# Patient Record
Sex: FEMALE | Race: WHITE | Employment: UNEMPLOYED | URBAN - METROPOLITAN AREA
[De-identification: names, ages, dates, MRNs, and addresses within clinical notes are randomized per-mention and may not be internally consistent; named-entity substitution may affect disease eponyms.]

---

## 2018-12-17 RX ORDER — NIFEDIPINE 30 MG/1
30 TABLET, FILM COATED, EXTENDED RELEASE ORAL DAILY
COMMUNITY
End: 2019-04-30

## 2018-12-17 RX ORDER — SODIUM CHLORIDE 1000 MG
1 TABLET, SOLUBLE MISCELLANEOUS DAILY
COMMUNITY
End: 2021-10-06

## 2018-12-17 RX ORDER — VARENICLINE TARTRATE 1 MG/1
1 TABLET, FILM COATED ORAL 2 TIMES DAILY
COMMUNITY

## 2018-12-17 RX ORDER — TIZANIDINE 2 MG/1
2 TABLET ORAL 2 TIMES DAILY
COMMUNITY
End: 2021-10-06

## 2018-12-17 RX ORDER — FEXOFENADINE HCL 180 MG/1
180 TABLET ORAL DAILY
COMMUNITY

## 2018-12-17 RX ORDER — ASPIRIN 81 MG/1
81 TABLET ORAL DAILY
COMMUNITY

## 2018-12-17 RX ORDER — ALENDRONATE SODIUM 35 MG/1
35 TABLET ORAL
COMMUNITY

## 2018-12-18 ENCOUNTER — HOSPITAL ENCOUNTER (OUTPATIENT)
Dept: CARDIAC CATH/INVASIVE PROCEDURES | Age: 68
Discharge: HOME | End: 2018-12-18
Payer: MEDICARE

## 2018-12-18 VITALS
SYSTOLIC BLOOD PRESSURE: 111 MMHG | HEIGHT: 64 IN | HEART RATE: 69 BPM | OXYGEN SATURATION: 99 % | RESPIRATION RATE: 18 BRPM | WEIGHT: 161.8 LBS | BODY MASS INDEX: 27.62 KG/M2 | TEMPERATURE: 97.7 F | DIASTOLIC BLOOD PRESSURE: 53 MMHG

## 2018-12-18 DIAGNOSIS — I77.1 INNOMINATE ARTERY STENOSIS (HCC): ICD-10-CM

## 2018-12-18 LAB
ANION GAP SERPL CALCULATED.3IONS-SCNC: 17 MMOL/L (ref 9–17)
BUN BLDV-MCNC: 20 MG/DL (ref 8–23)
BUN/CREAT BLD: 15 (ref 9–20)
CALCIUM SERPL-MCNC: 10.1 MG/DL (ref 8.6–10.4)
CHLORIDE BLD-SCNC: 96 MMOL/L (ref 98–107)
CO2: 23 MMOL/L (ref 20–31)
CREAT SERPL-MCNC: 1.33 MG/DL (ref 0.5–0.9)
GFR AFRICAN AMERICAN: 48 ML/MIN
GFR NON-AFRICAN AMERICAN: 40 ML/MIN
GFR SERPL CREATININE-BSD FRML MDRD: ABNORMAL ML/MIN/{1.73_M2}
GFR SERPL CREATININE-BSD FRML MDRD: ABNORMAL ML/MIN/{1.73_M2}
GLUCOSE BLD-MCNC: 103 MG/DL (ref 70–99)
HCT VFR BLD CALC: 34 % (ref 36–46)
HEMOGLOBIN: 11.9 G/DL (ref 12–16)
INR BLD: 1.1
MCH RBC QN AUTO: 33.2 PG (ref 26–34)
MCHC RBC AUTO-ENTMCNC: 35 G/DL (ref 31–37)
MCV RBC AUTO: 95.1 FL (ref 80–100)
NRBC AUTOMATED: ABNORMAL PER 100 WBC
PDW BLD-RTO: 13.1 % (ref 11.5–14.5)
PLATELET # BLD: 294 K/UL (ref 130–400)
PMV BLD AUTO: 7.9 FL (ref 6–12)
POTASSIUM SERPL-SCNC: 4.2 MMOL/L (ref 3.7–5.3)
PROTHROMBIN TIME: 11.2 SEC (ref 9.7–11.6)
RBC # BLD: 3.58 M/UL (ref 4–5.2)
SODIUM BLD-SCNC: 136 MMOL/L (ref 135–144)
WBC # BLD: 8.8 K/UL (ref 3.5–11)

## 2018-12-18 PROCEDURE — C1894 INTRO/SHEATH, NON-LASER: HCPCS

## 2018-12-18 PROCEDURE — G0269 OCCLUSIVE DEVICE IN VEIN ART: HCPCS | Performed by: SURGERY

## 2018-12-18 PROCEDURE — C1725 CATH, TRANSLUMIN NON-LASER: HCPCS

## 2018-12-18 PROCEDURE — 85027 COMPLETE CBC AUTOMATED: CPT

## 2018-12-18 PROCEDURE — 7100000011 HC PHASE II RECOVERY - ADDTL 15 MIN

## 2018-12-18 PROCEDURE — 6360000002 HC RX W HCPCS

## 2018-12-18 PROCEDURE — 37246 TRLUML BALO ANGIOP 1ST ART: CPT | Performed by: SURGERY

## 2018-12-18 PROCEDURE — 2580000003 HC RX 258: Performed by: SURGERY

## 2018-12-18 PROCEDURE — 6360000004 HC RX CONTRAST MEDICATION

## 2018-12-18 PROCEDURE — 36215 PLACE CATHETER IN ARTERY: CPT | Performed by: SURGERY

## 2018-12-18 PROCEDURE — C1769 GUIDE WIRE: HCPCS

## 2018-12-18 PROCEDURE — 75605 CONTRAST EXAM THORACIC AORTA: CPT | Performed by: SURGERY

## 2018-12-18 PROCEDURE — 85610 PROTHROMBIN TIME: CPT

## 2018-12-18 PROCEDURE — 2500000003 HC RX 250 WO HCPCS

## 2018-12-18 PROCEDURE — 80048 BASIC METABOLIC PNL TOTAL CA: CPT

## 2018-12-18 PROCEDURE — C1760 CLOSURE DEV, VASC: HCPCS

## 2018-12-18 PROCEDURE — 75710 ARTERY X-RAYS ARM/LEG: CPT | Performed by: SURGERY

## 2018-12-18 PROCEDURE — 7100000010 HC PHASE II RECOVERY - FIRST 15 MIN

## 2018-12-18 PROCEDURE — C1876 STENT, NON-COA/NON-COV W/DEL: HCPCS

## 2018-12-18 RX ORDER — ACETAMINOPHEN 325 MG/1
650 TABLET ORAL EVERY 4 HOURS PRN
Status: DISCONTINUED | OUTPATIENT
Start: 2018-12-18 | End: 2018-12-19 | Stop reason: HOSPADM

## 2018-12-18 RX ORDER — SODIUM CHLORIDE 9 MG/ML
INJECTION, SOLUTION INTRAVENOUS CONTINUOUS
Status: DISCONTINUED | OUTPATIENT
Start: 2018-12-18 | End: 2018-12-19 | Stop reason: HOSPADM

## 2018-12-18 RX ORDER — DEXTROSE AND SODIUM CHLORIDE 5; .45 G/100ML; G/100ML
INJECTION, SOLUTION INTRAVENOUS CONTINUOUS
Status: DISCONTINUED | OUTPATIENT
Start: 2018-12-18 | End: 2018-12-18

## 2018-12-18 RX ADMIN — SODIUM CHLORIDE: 9 INJECTION, SOLUTION INTRAVENOUS at 07:04

## 2018-12-18 ASSESSMENT — PAIN SCALES - GENERAL
PAINLEVEL_OUTOF10: 0

## 2018-12-18 ASSESSMENT — PAIN - FUNCTIONAL ASSESSMENT: PAIN_FUNCTIONAL_ASSESSMENT: 0-10

## 2018-12-18 NOTE — PROGRESS NOTES
Patient transferred from pre opt at this time. Patient vitals stable. Patient has 0.9 NS at 125 ml/hr, per report to continue until procedure.

## 2018-12-18 NOTE — OP NOTE
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 Indications and description of operative procedure: This is a 72-year-old female who I saw for carotid stenosis. She was noted to have reversal of her right vertebral artery with a difference in her right and left tracheal blood pressure suggesting innominate artery occlusion. She did have a CT of the chest which confirmed a high-grade focal stenosis of the proximal brachiocephalic artery. After the risks benefits alternatives were discussed and informed consent was obtained. The patient was brought to the [facility] replaced supine and the groins are clean and prepped in usual fashion sterile drapes applied. Under ultrasound guidance the right common femoral artery was accessed. A 5 [place] sheath was placed. Guidewire was advanced through the aortic arch and in an [place] of [place] projection and aortogram of the arch was taken. This showed the focal high-grade stenosis. Using a guidewire the lesion was able to be crossed and the guidewire was advanced into the right subclavian artery. It was heparinized with 5000 units of heparin. Lesion was predilated with a 5 mm x 40 balloon. Allowed for estimation of the brachycephalic artery which was 10 mm.   A 10 mm x 37 mm balloon expandable stent was then deployed. There was resolution of the stenosis on completion  aortogram.  The arteriotomy was closed and exposure device. The procedure was tolerated well without complication. She is maintained on light sedation and was answering questions throughout the entire procedure. There are no signs of neurologic deficits.

## 2018-12-18 NOTE — PROGRESS NOTES
Patient transferred to cath lab. Patient family given patients Cpap and other items. Patient to cath lab with glasses .

## 2019-04-04 ENCOUNTER — HOSPITAL ENCOUNTER (OUTPATIENT)
Dept: POSTOP/PACU | Age: 69
Discharge: HOME | End: 2019-04-04
Payer: MEDICARE

## 2019-04-04 ENCOUNTER — HOSPITAL ENCOUNTER (OUTPATIENT)
Dept: CT IMAGING | Age: 69
Discharge: HOME | End: 2019-04-06
Payer: MEDICARE

## 2019-04-04 VITALS
WEIGHT: 170 LBS | OXYGEN SATURATION: 98 % | TEMPERATURE: 97.4 F | DIASTOLIC BLOOD PRESSURE: 57 MMHG | HEART RATE: 72 BPM | SYSTOLIC BLOOD PRESSURE: 108 MMHG | BODY MASS INDEX: 29.02 KG/M2 | RESPIRATION RATE: 20 BRPM | HEIGHT: 64 IN

## 2019-04-04 DIAGNOSIS — I65.29 STENOSIS OF CAROTID ARTERY, UNSPECIFIED LATERALITY: ICD-10-CM

## 2019-04-04 LAB
CREAT SERPL-MCNC: 1.24 MG/DL (ref 0.5–0.9)
GFR AFRICAN AMERICAN: 52 ML/MIN
GFR NON-AFRICAN AMERICAN: 43 ML/MIN
GFR SERPL CREATININE-BSD FRML MDRD: ABNORMAL ML/MIN/{1.73_M2}
GFR SERPL CREATININE-BSD FRML MDRD: ABNORMAL ML/MIN/{1.73_M2}

## 2019-04-04 PROCEDURE — 96361 HYDRATE IV INFUSION ADD-ON: CPT

## 2019-04-04 PROCEDURE — 96360 HYDRATION IV INFUSION INIT: CPT

## 2019-04-04 PROCEDURE — 82565 ASSAY OF CREATININE: CPT

## 2019-04-04 PROCEDURE — 70498 CT ANGIOGRAPHY NECK: CPT

## 2019-04-04 PROCEDURE — 36415 COLL VENOUS BLD VENIPUNCTURE: CPT

## 2019-04-04 PROCEDURE — 2580000003 HC RX 258: Performed by: SURGERY

## 2019-04-04 PROCEDURE — 2580000003 HC RX 258: Performed by: INTERNAL MEDICINE

## 2019-04-04 PROCEDURE — 6360000004 HC RX CONTRAST MEDICATION: Performed by: SURGERY

## 2019-04-04 RX ORDER — SODIUM CHLORIDE 0.9 % (FLUSH) 0.9 %
10 SYRINGE (ML) INJECTION
Status: COMPLETED | OUTPATIENT
Start: 2019-04-04 | End: 2019-04-04

## 2019-04-04 RX ORDER — SODIUM CHLORIDE 9 MG/ML
INJECTION, SOLUTION INTRAVENOUS CONTINUOUS
Status: DISCONTINUED | OUTPATIENT
Start: 2019-04-04 | End: 2019-04-05 | Stop reason: HOSPADM

## 2019-04-04 RX ORDER — 0.9 % SODIUM CHLORIDE 0.9 %
80 INTRAVENOUS SOLUTION INTRAVENOUS ONCE
Status: COMPLETED | OUTPATIENT
Start: 2019-04-04 | End: 2019-04-04

## 2019-04-04 RX ADMIN — IOPAMIDOL 75 ML: 755 INJECTION, SOLUTION INTRAVENOUS at 11:48

## 2019-04-04 RX ADMIN — SODIUM CHLORIDE 80 ML: 9 INJECTION, SOLUTION INTRAVENOUS at 11:48

## 2019-04-04 RX ADMIN — Medication 10 ML: at 11:48

## 2019-04-04 RX ADMIN — SODIUM CHLORIDE: 9 INJECTION, SOLUTION INTRAVENOUS at 09:07

## 2019-04-04 ASSESSMENT — PAIN - FUNCTIONAL ASSESSMENT: PAIN_FUNCTIONAL_ASSESSMENT: 0-10

## 2019-04-30 ENCOUNTER — HOSPITAL ENCOUNTER (OUTPATIENT)
Dept: GENERAL RADIOLOGY | Age: 69
Discharge: HOME | End: 2019-05-02
Payer: MEDICARE

## 2019-04-30 ENCOUNTER — HOSPITAL ENCOUNTER (OUTPATIENT)
Dept: PREADMISSION TESTING | Age: 69
Discharge: HOME | End: 2019-05-04
Payer: MEDICARE

## 2019-04-30 VITALS
DIASTOLIC BLOOD PRESSURE: 50 MMHG | HEIGHT: 64 IN | OXYGEN SATURATION: 100 % | RESPIRATION RATE: 16 BRPM | BODY MASS INDEX: 29.32 KG/M2 | WEIGHT: 171.74 LBS | SYSTOLIC BLOOD PRESSURE: 117 MMHG | HEART RATE: 88 BPM

## 2019-04-30 LAB
-: ABNORMAL
ABSOLUTE EOS #: 0.46 K/UL (ref 0–0.44)
ABSOLUTE IMMATURE GRANULOCYTE: 0.02 K/UL (ref 0–0.3)
ABSOLUTE LYMPH #: 3.05 K/UL (ref 1.1–3.7)
ABSOLUTE MONO #: 0.6 K/UL (ref 0.1–1.2)
ALBUMIN SERPL-MCNC: 4.4 G/DL (ref 3.5–5.2)
ALBUMIN/GLOBULIN RATIO: ABNORMAL (ref 1–2.5)
ALP BLD-CCNC: 136 U/L (ref 35–104)
ALT SERPL-CCNC: 16 U/L (ref 5–33)
AMORPHOUS: ABNORMAL
ANION GAP SERPL CALCULATED.3IONS-SCNC: 15 MMOL/L (ref 9–17)
AST SERPL-CCNC: 22 U/L
BACTERIA: ABNORMAL
BASOPHILS # BLD: 1 % (ref 0–2)
BASOPHILS ABSOLUTE: 0.08 K/UL (ref 0–0.2)
BILIRUB SERPL-MCNC: 0.64 MG/DL (ref 0.3–1.2)
BILIRUBIN DIRECT: 0.12 MG/DL
BILIRUBIN URINE: NEGATIVE
BILIRUBIN, INDIRECT: 0.52 MG/DL (ref 0–1)
BUN BLDV-MCNC: 18 MG/DL (ref 8–23)
CASTS UA: ABNORMAL /LPF
CHLORIDE BLD-SCNC: 100 MMOL/L (ref 98–107)
CO2: 25 MMOL/L (ref 20–31)
COLOR: YELLOW
COMMENT UA: ABNORMAL
CREAT SERPL-MCNC: 1.19 MG/DL (ref 0.5–0.9)
CRYSTALS, UA: ABNORMAL /HPF
DIFFERENTIAL TYPE: ABNORMAL
EOSINOPHILS RELATIVE PERCENT: 5 % (ref 1–4)
EPITHELIAL CELLS UA: ABNORMAL /HPF (ref 0–5)
GFR AFRICAN AMERICAN: 55 ML/MIN
GFR NON-AFRICAN AMERICAN: 45 ML/MIN
GFR SERPL CREATININE-BSD FRML MDRD: ABNORMAL ML/MIN/{1.73_M2}
GFR SERPL CREATININE-BSD FRML MDRD: ABNORMAL ML/MIN/{1.73_M2}
GLOBULIN: ABNORMAL G/DL (ref 1.5–3.8)
GLUCOSE URINE: NEGATIVE
HCT VFR BLD CALC: 38.8 % (ref 36.3–47.1)
HEMOGLOBIN: 12.4 G/DL (ref 11.9–15.1)
IMMATURE GRANULOCYTES: 0 %
INR BLD: 1.3
KETONES, URINE: NEGATIVE
LEUKOCYTE ESTERASE, URINE: ABNORMAL
LYMPHOCYTES # BLD: 36 % (ref 24–43)
MCH RBC QN AUTO: 31.2 PG (ref 25.2–33.5)
MCHC RBC AUTO-ENTMCNC: 32 G/DL (ref 28.4–34.8)
MCV RBC AUTO: 97.5 FL (ref 82.6–102.9)
MONOCYTES # BLD: 7 % (ref 3–12)
MUCUS: ABNORMAL
NITRITE, URINE: NEGATIVE
NRBC AUTOMATED: 0 PER 100 WBC
OTHER OBSERVATIONS UA: ABNORMAL
PARTIAL THROMBOPLASTIN TIME: 34.1 SEC (ref 23–31)
PDW BLD-RTO: 13.9 % (ref 11.8–14.4)
PH UA: 5.5 (ref 5–8)
PLATELET # BLD: 309 K/UL (ref 138–453)
PLATELET ESTIMATE: ABNORMAL
PMV BLD AUTO: 10.6 FL (ref 8.1–13.5)
POTASSIUM SERPL-SCNC: 3.9 MMOL/L (ref 3.7–5.3)
PROTEIN UA: NEGATIVE
PROTHROMBIN TIME: 13.4 SEC (ref 9.7–11.6)
RBC # BLD: 3.98 M/UL (ref 3.95–5.11)
RBC # BLD: ABNORMAL 10*6/UL
RBC UA: ABNORMAL /HPF (ref 0–2)
RENAL EPITHELIAL, UA: ABNORMAL /HPF
SEG NEUTROPHILS: 51 % (ref 36–65)
SEGMENTED NEUTROPHILS ABSOLUTE COUNT: 4.3 K/UL (ref 1.5–8.1)
SODIUM BLD-SCNC: 140 MMOL/L (ref 135–144)
SPECIFIC GRAVITY UA: 1.01 (ref 1–1.03)
TOTAL PROTEIN: 8.2 G/DL (ref 6.4–8.3)
TRICHOMONAS: ABNORMAL
TURBIDITY: CLEAR
URINE HGB: NEGATIVE
UROBILINOGEN, URINE: NORMAL
WBC # BLD: 8.5 K/UL (ref 3.5–11.3)
WBC # BLD: ABNORMAL 10*3/UL
WBC UA: ABNORMAL /HPF (ref 0–5)
YEAST: ABNORMAL

## 2019-04-30 PROCEDURE — 84520 ASSAY OF UREA NITROGEN: CPT

## 2019-04-30 PROCEDURE — 86850 RBC ANTIBODY SCREEN: CPT

## 2019-04-30 PROCEDURE — 81001 URINALYSIS AUTO W/SCOPE: CPT

## 2019-04-30 PROCEDURE — 86900 BLOOD TYPING SEROLOGIC ABO: CPT

## 2019-04-30 PROCEDURE — 85025 COMPLETE CBC W/AUTO DIFF WBC: CPT

## 2019-04-30 PROCEDURE — 71046 X-RAY EXAM CHEST 2 VIEWS: CPT

## 2019-04-30 PROCEDURE — 82565 ASSAY OF CREATININE: CPT

## 2019-04-30 PROCEDURE — 80076 HEPATIC FUNCTION PANEL: CPT

## 2019-04-30 PROCEDURE — 85610 PROTHROMBIN TIME: CPT

## 2019-04-30 PROCEDURE — 80051 ELECTROLYTE PANEL: CPT

## 2019-04-30 PROCEDURE — 93005 ELECTROCARDIOGRAM TRACING: CPT

## 2019-04-30 PROCEDURE — 36415 COLL VENOUS BLD VENIPUNCTURE: CPT

## 2019-04-30 PROCEDURE — 85730 THROMBOPLASTIN TIME PARTIAL: CPT

## 2019-04-30 PROCEDURE — 86901 BLOOD TYPING SEROLOGIC RH(D): CPT

## 2019-04-30 RX ORDER — TIZANIDINE 4 MG/1
4 TABLET ORAL NIGHTLY
COMMUNITY
End: 2021-10-06

## 2019-04-30 RX ORDER — CEFAZOLIN SODIUM 2 G/50ML
2 SOLUTION INTRAVENOUS ONCE
Status: CANCELLED | OUTPATIENT
Start: 2019-05-16

## 2019-04-30 RX ORDER — PREGABALIN 150 MG/1
150 CAPSULE ORAL 2 TIMES DAILY
COMMUNITY

## 2019-04-30 NOTE — PRE-PROCEDURE INSTRUCTIONS
[facility] ON Thursday, May 16th at 08:00 AM    Once you enter the hospital lobby, take the elevators to the second floor. Check-In is at the surgery registration desk. If you have been given a blood band, you must bring it with you the day of surgery. Continue to take your home medications as you normally do up to and including the night before surgery with the exception of any blood thinning medications. Please stop any blood thinning medications as directed by your surgeon or prescribing physician. Failure to stop certain medications may interfere with your scheduled surgery. These may include:  Aspirin, Warfarin (Coumadin), Clopidogrel (Plavix), Ibuprofen (Motrin, Advil), Naproxen (Aleve), Meloxicam (Mobic), Celecoxib (Celebrex), Eliquis, Pradaxa, Xarelto, Effient, Fish Oil, Herbal supplements. Stop xarelto 2-3 days prior to surgery per Cardiologist    If you are diabetic, do not take any of your diabetic medications by mouth the morning of surgery. If you are taking insulin contact the doctor that manages your diabetes for instructions about any changes to your insulin dosages the day before surgery. Do not inject insulin or other injectable diabetic medications the morning of surgery unless otherwise instructed by the doctor who manages your diabetes. Please take the following medication(s) the day of surgery with a small sip of water:  Lyrica,losartan,atenolol,levothyroxine, and prilosec  Please use your inhalers at home the day of surgery. PREPARING FOR YOUR SURGERY:     Before surgery, you can play an important role in your own health. Because skin is not sterile, we need to be sure that your skin is as free of germs as possible before surgery by carefully washing before surgery. Preparing or prepping skin before surgery can reduce the risk of a surgical site infection.   Do not shave the area of your body where your surgery will be performed unless you received specific permission from your physician. You will need to shower at home the night before surgery and the morning of surgery with a special soap called chlorhexidine gluconate (CHG*). *Not to be used by people allergic to Chlorhexidine Gluconate (CHG). Following these instructions will help you be sure that your skin is clean before surgery. Instructions on cleaning your skin before surgery: The night before your surgery:      You will need to shower with warm water (not hot) and the CHG soap.  Use a clean wash cloth and a clean towel. Have clean clothes available to put on after the shower.    First wash your hair with regular shampoo. Rinse your hair and body thoroughly to remove the shampoo. [facility] Wash your face with your regular soap or water only. Thoroughly rinse your body with warm water from the neck down.  Turn water off to prevent rinsing the soap off too soon.  With a clean wet washcloth and half of the CHG soap in the bottle, lather your entire body from the neck down. Do not use CHG soap near your eyes or ears to avoid injury to those areas.  Wash thoroughly, paying special attention to the area where your surgery will be performed.  Wash your body gently for five (5) minutes. Avoid scrubbing your skin too hard.  Turn the water back on and rinse your body thoroughly.  Pat yourself dry with a clean, soft towel. Do not apply lotion, cream or powder.  Dress with clean freshly washed clothes. The morning of surgery:     Repeat shower following steps above - using remaining half of CHG soap in bottle. Patient Instructions:    [facility] If you are having any type of anesthesia you are to have nothing to eat or drink after midnight the night before your surgery.   This includes gum, mints, water or smoking or chewing tobacco.  The only exception to this is a small sip of water to take with any morning dose of heart, blood pressure, or seizure medications. No alcoholic beverages for 24 hours prior to surgery.  Bring your eyeglasses and case with you. No contacts are to be worn the day of surgery. You also may bring your hearing aids.  If you are on C-PAP or Bi-PAP at home and plan on staying in the hospital overnight for your surgery please bring the machine with you. · Do not wear any jewelry or body piercings day of surgery. Also, NO lotion, perfume or deodorant to be used the day of surgery. No nail polish on the operative extremity (arm/leg surgeries)    ·   If you are staying overnight with us, please bring a small bag of personal items.  Please wear loose, comfortable clothing. If you are potentially going to have a cast or brace bring clothing that will fit over them.  In case of illness - If you have cold or flu like symptoms (high fever, runny nose, sore throat, cough, etc.) rash, nausea, vomiting, loose stools, and/or recent contact with someone who has a contagious disease (chicken pox, measles, etc.) Please call your doctor before coming to the hospital.     If your child is having surgery please make arrangements for any other children to be cared for at home on the day of surgery. Other children are not permitted in recovery room and we want you to be able to spend time with the patient. If other arrangements are not available then we suggest that you have a second adult to stay in the waiting room. Day of Surgery/Procedure:    As a patient at [place] [address] you can expect quality medical and nursing care that is centered on your individual needs. Our goal is to make your surgical experience as comfortable as possible    . Transportation After Your Surgery/Procedure: You will need a friend or family member to drive you home after your procedure.   Your  must be 25years of age or older and able to sign off on your discharge instructions. A taxi cab or any other form of public transportation is not acceptable. Your friend or family member must stay at the hospital throughout your procedure. Someone must remain with you for the first 24 hours after your surgery if you receive anesthesia or medication. If you do not have someone to stay with you, your procedure may be cancelled.       If you have any other questions regarding your procedure or the day of surgery, please call [phone]      _________________________  ____________________________  Signature (Patient)              Signature (Provider) & date

## 2019-04-30 NOTE — H&P
History and Physical Service   [facility]    HISTORY AND PHYSICAL EXAMINATION            Date of Evaluation: 4/30/2019  Patient name:  Colleen Babcock  MRN:   [id number]  YOB: 1950  PCP:    Karena Byrd MD    History Obtained From:     Patient, medical records    History of Present Illness: This is Colleen Babcock a 79 y.o. female with multiple comorbidities who presents to for upcoming left carotid endarterectomy by Dr. Lalita Marinelli for carotid stenosis. Patient Hx PVD, PAD, carotid stenosis and renal disease. She is followed by Vascular Surgeon, Dr Lalita Marinelli. S/p Right CFA/aortogram with arch/stenting of innominate artery 12/18/19. CTA of Neck on 4/4/19 confirmed >75% stenosis left internal carotid artery. The patient returns for future surgical intervention. Today she denies  dizziness, lightheadedness, syncope, sudden extremity weakness, speech or vision changes. Advised to call 911 if develops sudden stroke symptoms Patient understood and agrees. Hx CAD and A Fib on long term anticoagulant therapy Xarelto and ASA 81mg daily. EKG today showed Atrial Fibrillation/flutter. Patient asymptomatic at present  Anesthesiologist Dr Isauro Cunha reviewed EKG  Patient recently saw Dr Johnnie Colón and was given cardiac clearance with hard copy report dated 4/22/19 in short chart. Preop Nurse called Dr Johnnie Colón office to get a current records and results. Functional Capacity   Can walk a block on flat and incline surfaces. Yesterday \"counted each step I could take and walked 700 steps\" Can usually walk around house, grocery shop pushing a cart w/o chest pain, palpitations,  lightheadedness, dizziness, feeling sweaty, SOB or unusual fatigue. \"Usually stops due to back pain\".     Past Medical History:     Past Medical History:   Diagnosis Date    A-fib ([facility] [address]     Anxiety     Arthritis     Blood transfusion reaction     2001 with surgery    CAD (coronary artery disease)     Carotid stenosis Historical Provider, MD   fluticasone (FLONASE) 50 MCG/ACT nasal spray 2 sprays by Nasal route daily. Historical Provider, MD   levothyroxine (SYNTHROID) 50 MCG tablet Take 50 mcg by mouth Daily. Historical Provider, MD   omeprazole (PRILOSEC) 40 MG delayed release capsule Take 40 mg by mouth daily. Historical Provider, MD   albuterol (PROVENTIL HFA;VENTOLIN HFA) 108 (90 BASE) MCG/ACT inhaler Inhale 2 puffs into the lungs every 4 hours as needed for Wheezing. Historical Provider, MD   ALPRAZolam Lovina Cocking) 1 MG tablet Take 1 mg by mouth 3 times daily as needed for Sleep or Anxiety. Shannan Never Historical Provider, MD   traMADol (ULTRAM) 50 MG tablet Take 50 mg by mouth 4 times daily as needed for Pain. Historical Provider, MD   cyanocobalamin 1000 MCG/ML injection Inject 1,000 mcg into the muscle once. Patient takes monthly    Historical Provider, MD   atenolol (TENORMIN) 25 MG tablet Take 12.5 mg by mouth 2 times daily     Historical Provider, MD   simvastatin (ZOCOR) 20 MG tablet Take 20 mg by mouth nightly. Historical Provider, MD   niacin (NIASPAN) 500 MG CR tablet Take 1,000 mg by mouth nightly. Historical Provider, MD   folic acid (FOLVITE) 1 MG tablet Take 1 mg by mouth daily. Historical Provider, MD   Cholecalciferol (VITAMIN D) 2000 UNITS TABS Take  by mouth. Historical Provider, MD        Allergies: Iv contrast [iodides]    Social History:     Tobacco:    reports that she quit smoking about 5 years ago. She has quit using smokeless tobacco.  Alcohol:      reports that she does not drink alcohol. Drug Use:  reports that she does not use drugs.     Family History:     Family History   Problem Relation Age of Onset    Heart Attack Father     Stroke Father     High Blood Pressure Father     Heart Disease Father     Cancer Mother         Multiple myeloma    Cancer Sister         Lung with mets to brain    Cancer Maternal Aunt         Several aunts had unknown cancer    Heart Attack Brother     Liver Disease Brother     Heart Defect Sister        Review of Systems:     Positive and Negative as described in HPI. CONSTITUTIONAL:  negative for fevers, chills, sweats, fatigue, weight loss  HEENT: Dr Rodney Mccormack Ophthalmologist for left eye injections Legally blind OD  Wears glasses  + full dentures and hearing aids  denies hearing changes, runny nose, throat pain  RESPIRATORY:  negative for shortness of breath, cough, congestion, wheezing. CARDIOVASCULAR: Hx A Fib Follows with Dr Patsy Johnson with recent visit and clearance for upcoming surgery  negative for chest pain, palpitations. GASTROINTESTINAL:  negative for nausea, vomiting, diarrhea, constipation, change in bowel habits, abdominal pain   GENITOURINARY:  negative for difficulty of urination, burning with urination, frequency   INTEGUMENT:  Bruising easily negative for rash, skin lesions  HEMATOLOGIC/LYMPHATIC:  negative for swelling/edema   ALLERGIC/IMMUNOLOGIC:  negative for urticaria , itching  ENDOCRINE:  negative increase in drinking, increase in urination, hot or cold intolerance  MUSCULOSKELETAL: + lower back pain   negative joint pains, muscle aches, swelling of joints  NEUROLOGICAL:  negative for headaches, dizziness, lightheadedness, numbness, pain, tingling extremities  BEHAVIOR/PSYCH:  negative for depression, anxiety    Physical Exam:   BP (!) 117/50   Pulse 88   Resp 16   Ht 5' 3.5\" (1.613 m)   Wt 171 lb 11.8 oz (77.9 kg)   SpO2 100%   BMI 29.94 kg/m²   No LMP recorded. Patient is postmenopausal.  [id number]  No results for input(s): POCGLU in the last 72 hours. General Appearance:  alert, coherent, well appearing, and in no acute distress  Mental status: oriented to person, place, and time with normal affect  Head:  normocephalic, atraumatic.   Eye: Blind OD  Decreased vision OS no icterus, redness, pupils equal and reactive, extraocular eye movements intact, conjunctiva clear  Ear: [place] wears hearing aids  normal external ear, no discharge, hearing intact  Nose:  no drainage noted  Mouth: mucous membranes moist Full dentures   Neck: supple, no carotid bruits, thyroid not palpable  Lungs: Bilateral equal expansion diminished breath sounds, unlabored w/o use of accessory muscles, no wheezing, rales or rhonchi, normal effort  Cardiovascular: HR 88 Irregularly irregular rhythm w/o symptoms  (Known Hx A Fib)  no murmur, gallop, rub. Abdomen: Soft, round, nontender, nondistended, normal bowel sounds  Neurologic: decreased muscle tone left quad There are no new focal motor or sensory deficits, and bulk, no abnormal sensation, normal speech, cranial nerves II through XII grossly intact  Skin: No gross lesions, rashes, bruising or bleeding on exposed skin area  Extremities: Slow gait  DP pulses not palpable bilaterally  PT pulses 1+ palpable bilaterally,  no pedal edema or calf pain with palpation  Psych: normal affect     Investigations:      Laboratory Testing:  No results found for this or any previous visit (from the past 24 hour(s)). Recent Labs     04/04/19  0840   CREATININE 1.24*       Imaging/Diagnostics:    EXAMINATION: CTA OF THE NECK 4/4/2019 11:21 am   Impression Stenosis within the proximal left internal carotid artery measuring greater than 75% by NASCET criteria. Stent within the proximal brachiocephalic artery that is patent. Mild atelectasis in the visualized lungs. Diagnosis:      1. Carotid stenosis    Plans:     1.  Left carotid endarterectomy       RAYMUNDO Guzman CNP  4/30/2019  3:25 PM

## 2019-04-30 NOTE — H&P (VIEW-ONLY)
 COPD (chronic obstructive pulmonary disease) (HCC)     GERD (gastroesophageal reflux disease)     Hyperlipidemia     Hypertension     Hypothyroid     Kidney disease     GFR 60, with mass lower left chamber and right upper     Legally blind     PVD (peripheral vascular disease) ([facility] [address]     Sleep apnea     CPAP    Subclavian artery stenosis (HCC)         Past Surgical History:     Past Surgical History:   Procedure Laterality Date    AORTO-FEMORAL BYPASS GRAFT  2001    CARDIAC CATHETERIZATION  10/2012    CAROTID ENDARTERECTOMY      CATARACT REMOVAL Bilateral     CHOLECYSTECTOMY  05/05/2014    laparscopic    EYE SURGERY      LASIK LEFT EYE    TOE AMPUTATION  2001    gangrene 2001    TONSILLECTOMY      TUMOR REMOVAL Left     carotid        Medications Prior to Admission:     Prior to Admission medications    Medication Sig Start Date End Date Taking? Authorizing Provider   tiZANidine (ZANAFLEX) 4 MG tablet Take 4 mg by mouth nightly Also 2 times daily order   Yes Historical Provider, MD   pregabalin (LYRICA) 100 MG capsule Take 100 mg by mouth 2 times daily. Yes Historical Provider, MD   alendronate (FOSAMAX) 35 MG tablet Take 35 mg by mouth every 7 days    Historical Provider, MD   aspirin 81 MG EC tablet Take 81 mg by mouth daily    Historical Provider, MD   varenicline (CHANTIX) 1 MG tablet Take 1 mg by mouth 2 times daily    Historical Provider, MD   fexofenadine (ALLEGRA) 180 MG tablet Take 180 mg by mouth daily    Historical Provider, MD   sodium chloride 1 g tablet Take 1 g by mouth daily    Historical Provider, MD   tiZANidine (ZANAFLEX) 2 MG tablet Take 2 mg by mouth 2 times daily    Historical Provider, MD   rivaroxaban (XARELTO) 20 MG TABS tablet Take 20 mg by mouth nightly     Historical Provider, MD   fluticasone-salmeterol (ADVAIR) 250-50 MCG/DOSE AEPB Inhale 1 puff into the lungs every 12 hours. Historical Provider, MD   losartan (COZAAR) 100 MG tablet Take 100 mg by mouth daily. Attack Brother     Liver Disease Brother     Heart Defect Sister        Review of Systems:     Positive and Negative as described in HPI. CONSTITUTIONAL:  negative for fevers, chills, sweats, fatigue, weight loss  HEENT: Dr Ryder Smyth Ophthalmologist for left eye injections Legally blind OD  Wears glasses  + full dentures and hearing aids  denies hearing changes, runny nose, throat pain  RESPIRATORY:  negative for shortness of breath, cough, congestion, wheezing. CARDIOVASCULAR: Hx A Fib Follows with Dr Navjot Morris with recent visit and clearance for upcoming surgery  negative for chest pain, palpitations. GASTROINTESTINAL:  negative for nausea, vomiting, diarrhea, constipation, change in bowel habits, abdominal pain   GENITOURINARY:  negative for difficulty of urination, burning with urination, frequency   INTEGUMENT:  Bruising easily negative for rash, skin lesions  HEMATOLOGIC/LYMPHATIC:  negative for swelling/edema   ALLERGIC/IMMUNOLOGIC:  negative for urticaria , itching  ENDOCRINE:  negative increase in drinking, increase in urination, hot or cold intolerance  MUSCULOSKELETAL: + lower back pain   negative joint pains, muscle aches, swelling of joints  NEUROLOGICAL:  negative for headaches, dizziness, lightheadedness, numbness, pain, tingling extremities  BEHAVIOR/PSYCH:  negative for depression, anxiety    Physical Exam:   BP (!) 117/50   Pulse 88   Resp 16   Ht 5' 3.5\" (1.613 m)   Wt 171 lb 11.8 oz (77.9 kg)   SpO2 100%   BMI 29.94 kg/m²   No LMP recorded. Patient is postmenopausal.  [id number]  No results for input(s): POCGLU in the last 72 hours. General Appearance:  alert, coherent, well appearing, and in no acute distress  Mental status: oriented to person, place, and time with normal affect  Head:  normocephalic, atraumatic.   Eye: Blind OD  Decreased vision OS no icterus, redness, pupils equal and reactive, extraocular eye movements intact, conjunctiva clear  Ear: [place] wears hearing aids  normal

## 2019-05-01 LAB
ABO/RH: NORMAL
ANTIBODY SCREEN: NEGATIVE
ARM BAND NUMBER: NORMAL
DU ANTIGEN: NEGATIVE
EKG ATRIAL RATE: 178 BPM
EKG Q-T INTERVAL: 386 MS
EKG QRS DURATION: 70 MS
EKG QTC CALCULATION (BAZETT): 453 MS
EKG R AXIS: 9 DEGREES
EKG T AXIS: 23 DEGREES
EKG VENTRICULAR RATE: 83 BPM
EXPIRATION DATE: NORMAL

## 2019-05-15 ENCOUNTER — ANESTHESIA EVENT (OUTPATIENT)
Dept: OPERATING ROOM | Age: 69
DRG: 039 | End: 2019-05-15
Payer: MEDICARE

## 2019-05-16 ENCOUNTER — HOSPITAL ENCOUNTER (INPATIENT)
Age: 69
LOS: 1 days | Discharge: HOME | DRG: 039 | End: 2019-05-17
Attending: SURGERY | Admitting: SURGERY
Payer: MEDICARE

## 2019-05-16 ENCOUNTER — ANESTHESIA (OUTPATIENT)
Dept: OPERATING ROOM | Age: 69
DRG: 039 | End: 2019-05-16
Payer: MEDICARE

## 2019-05-16 VITALS — DIASTOLIC BLOOD PRESSURE: 202 MMHG | OXYGEN SATURATION: 96 % | SYSTOLIC BLOOD PRESSURE: 250 MMHG | TEMPERATURE: 97.5 F

## 2019-05-16 DIAGNOSIS — Z98.890 S/P CAROTID ENDARTERECTOMY: Primary | ICD-10-CM

## 2019-05-16 PROCEDURE — 2720000010 HC SURG SUPPLY STERILE: Performed by: SURGERY

## 2019-05-16 PROCEDURE — 6360000002 HC RX W HCPCS: Performed by: SPECIALIST

## 2019-05-16 PROCEDURE — C1768 GRAFT, VASCULAR: HCPCS | Performed by: SURGERY

## 2019-05-16 PROCEDURE — 88304 TISSUE EXAM BY PATHOLOGIST: CPT

## 2019-05-16 PROCEDURE — 7100000000 HC PACU RECOVERY - FIRST 15 MIN: Performed by: SURGERY

## 2019-05-16 PROCEDURE — 2580000003 HC RX 258: Performed by: SURGERY

## 2019-05-16 PROCEDURE — 2580000003 HC RX 258: Performed by: ANESTHESIOLOGY

## 2019-05-16 PROCEDURE — 03CJ0ZZ EXTIRPATION OF MATTER FROM LEFT COMMON CAROTID ARTERY, OPEN APPROACH: ICD-10-PCS | Performed by: STUDENT IN AN ORGANIZED HEALTH CARE EDUCATION/TRAINING PROGRAM

## 2019-05-16 PROCEDURE — 7100000001 HC PACU RECOVERY - ADDTL 15 MIN: Performed by: SURGERY

## 2019-05-16 PROCEDURE — 2500000003 HC RX 250 WO HCPCS: Performed by: SPECIALIST

## 2019-05-16 PROCEDURE — 03UJ0KZ SUPPLEMENT LEFT COMMON CAROTID ARTERY WITH NONAUTOLOGOUS TISSUE SUBSTITUTE, OPEN APPROACH: ICD-10-PCS | Performed by: STUDENT IN AN ORGANIZED HEALTH CARE EDUCATION/TRAINING PROGRAM

## 2019-05-16 PROCEDURE — 6360000002 HC RX W HCPCS: Performed by: SURGERY

## 2019-05-16 PROCEDURE — 03UN0KZ SUPPLEMENT LEFT EXTERNAL CAROTID ARTERY WITH NONAUTOLOGOUS TISSUE SUBSTITUTE, OPEN APPROACH: ICD-10-PCS | Performed by: STUDENT IN AN ORGANIZED HEALTH CARE EDUCATION/TRAINING PROGRAM

## 2019-05-16 PROCEDURE — 3600000012 HC SURGERY LEVEL 2 ADDTL 15MIN: Performed by: SURGERY

## 2019-05-16 PROCEDURE — 2500000003 HC RX 250 WO HCPCS: Performed by: SURGERY

## 2019-05-16 PROCEDURE — 2060000000 HC ICU INTERMEDIATE R&B

## 2019-05-16 PROCEDURE — 6370000000 HC RX 637 (ALT 250 FOR IP): Performed by: STUDENT IN AN ORGANIZED HEALTH CARE EDUCATION/TRAINING PROGRAM

## 2019-05-16 PROCEDURE — 2580000003 HC RX 258: Performed by: STUDENT IN AN ORGANIZED HEALTH CARE EDUCATION/TRAINING PROGRAM

## 2019-05-16 PROCEDURE — 03UL0KZ SUPPLEMENT LEFT INTERNAL CAROTID ARTERY WITH NONAUTOLOGOUS TISSUE SUBSTITUTE, OPEN APPROACH: ICD-10-PCS | Performed by: STUDENT IN AN ORGANIZED HEALTH CARE EDUCATION/TRAINING PROGRAM

## 2019-05-16 PROCEDURE — 6370000000 HC RX 637 (ALT 250 FOR IP): Performed by: SURGERY

## 2019-05-16 PROCEDURE — 2709999900 HC NON-CHARGEABLE SUPPLY: Performed by: SURGERY

## 2019-05-16 PROCEDURE — 3600000002 HC SURGERY LEVEL 2 BASE: Performed by: SURGERY

## 2019-05-16 PROCEDURE — 03CN0ZZ EXTIRPATION OF MATTER FROM LEFT EXTERNAL CAROTID ARTERY, OPEN APPROACH: ICD-10-PCS | Performed by: STUDENT IN AN ORGANIZED HEALTH CARE EDUCATION/TRAINING PROGRAM

## 2019-05-16 PROCEDURE — 03CL0ZZ EXTIRPATION OF MATTER FROM LEFT INTERNAL CAROTID ARTERY, OPEN APPROACH: ICD-10-PCS | Performed by: STUDENT IN AN ORGANIZED HEALTH CARE EDUCATION/TRAINING PROGRAM

## 2019-05-16 PROCEDURE — 3700000001 HC ADD 15 MINUTES (ANESTHESIA): Performed by: SURGERY

## 2019-05-16 PROCEDURE — 88311 DECALCIFY TISSUE: CPT

## 2019-05-16 PROCEDURE — 3700000000 HC ANESTHESIA ATTENDED CARE: Performed by: SURGERY

## 2019-05-16 PROCEDURE — 6360000002 HC RX W HCPCS: Performed by: STUDENT IN AN ORGANIZED HEALTH CARE EDUCATION/TRAINING PROGRAM

## 2019-05-16 DEVICE — PATCH VASC W0.8XL8CM PERIPH BOV PERICARD N PVC N DEHP CRSS: Type: IMPLANTABLE DEVICE | Site: NECK | Status: FUNCTIONAL

## 2019-05-16 RX ORDER — ATENOLOL 25 MG/1
12.5 TABLET ORAL 2 TIMES DAILY
Status: DISCONTINUED | OUTPATIENT
Start: 2019-05-16 | End: 2019-05-17 | Stop reason: HOSPADM

## 2019-05-16 RX ORDER — CEFAZOLIN SODIUM 2 G/50ML
2 SOLUTION INTRAVENOUS ONCE
Status: DISCONTINUED | OUTPATIENT
Start: 2019-05-16 | End: 2019-05-16

## 2019-05-16 RX ORDER — HYDRALAZINE HYDROCHLORIDE 20 MG/ML
10 INJECTION INTRAMUSCULAR; INTRAVENOUS
Status: DISCONTINUED | OUTPATIENT
Start: 2019-05-16 | End: 2019-05-17 | Stop reason: HOSPADM

## 2019-05-16 RX ORDER — MORPHINE SULFATE 2 MG/ML
2 INJECTION, SOLUTION INTRAMUSCULAR; INTRAVENOUS
Status: DISCONTINUED | OUTPATIENT
Start: 2019-05-16 | End: 2019-05-17 | Stop reason: HOSPADM

## 2019-05-16 RX ORDER — LEVOTHYROXINE SODIUM 0.05 MG/1
50 TABLET ORAL DAILY
Status: DISCONTINUED | OUTPATIENT
Start: 2019-05-16 | End: 2019-05-17 | Stop reason: HOSPADM

## 2019-05-16 RX ORDER — ACETAMINOPHEN 500 MG
1000 TABLET ORAL EVERY 8 HOURS PRN
Status: DISCONTINUED | OUTPATIENT
Start: 2019-05-16 | End: 2019-05-17 | Stop reason: HOSPADM

## 2019-05-16 RX ORDER — ONDANSETRON 2 MG/ML
4 INJECTION INTRAMUSCULAR; INTRAVENOUS EVERY 6 HOURS PRN
Status: DISCONTINUED | OUTPATIENT
Start: 2019-05-16 | End: 2019-05-16

## 2019-05-16 RX ORDER — FLUTICASONE PROPIONATE 50 MCG
2 SPRAY, SUSPENSION (ML) NASAL DAILY
Status: DISCONTINUED | OUTPATIENT
Start: 2019-05-16 | End: 2019-05-17 | Stop reason: HOSPADM

## 2019-05-16 RX ORDER — SODIUM CHLORIDE 0.9 % (FLUSH) 0.9 %
10 SYRINGE (ML) INJECTION PRN
Status: DISCONTINUED | OUTPATIENT
Start: 2019-05-16 | End: 2019-05-16 | Stop reason: HOSPADM

## 2019-05-16 RX ORDER — SODIUM CHLORIDE 0.9 % (FLUSH) 0.9 %
10 SYRINGE (ML) INJECTION PRN
Status: DISCONTINUED | OUTPATIENT
Start: 2019-05-16 | End: 2019-05-17 | Stop reason: HOSPADM

## 2019-05-16 RX ORDER — ONDANSETRON 2 MG/ML
4 INJECTION INTRAMUSCULAR; INTRAVENOUS EVERY 6 HOURS PRN
Status: DISCONTINUED | OUTPATIENT
Start: 2019-05-16 | End: 2019-05-17 | Stop reason: HOSPADM

## 2019-05-16 RX ORDER — LIDOCAINE HYDROCHLORIDE 10 MG/ML
1 INJECTION, SOLUTION EPIDURAL; INFILTRATION; INTRACAUDAL; PERINEURAL
Status: DISCONTINUED | OUTPATIENT
Start: 2019-05-17 | End: 2019-05-16 | Stop reason: HOSPADM

## 2019-05-16 RX ORDER — SODIUM CHLORIDE, SODIUM LACTATE, POTASSIUM CHLORIDE, CALCIUM CHLORIDE 600; 310; 30; 20 MG/100ML; MG/100ML; MG/100ML; MG/100ML
INJECTION, SOLUTION INTRAVENOUS CONTINUOUS
Status: DISCONTINUED | OUTPATIENT
Start: 2019-05-16 | End: 2019-05-17 | Stop reason: HOSPADM

## 2019-05-16 RX ORDER — ALPRAZOLAM 1 MG/1
1 TABLET ORAL 3 TIMES DAILY PRN
Status: DISCONTINUED | OUTPATIENT
Start: 2019-05-16 | End: 2019-05-17 | Stop reason: HOSPADM

## 2019-05-16 RX ORDER — FOLIC ACID 1 MG/1
1 TABLET ORAL DAILY
Status: DISCONTINUED | OUTPATIENT
Start: 2019-05-16 | End: 2019-05-17 | Stop reason: HOSPADM

## 2019-05-16 RX ORDER — ASPIRIN 81 MG/1
81 TABLET ORAL DAILY
Status: DISCONTINUED | OUTPATIENT
Start: 2019-05-16 | End: 2019-05-17 | Stop reason: HOSPADM

## 2019-05-16 RX ORDER — PROTAMINE SULFATE 10 MG/ML
INJECTION, SOLUTION INTRAVENOUS PRN
Status: DISCONTINUED | OUTPATIENT
Start: 2019-05-16 | End: 2019-05-16 | Stop reason: SDUPTHER

## 2019-05-16 RX ORDER — MORPHINE SULFATE 4 MG/ML
4 INJECTION, SOLUTION INTRAMUSCULAR; INTRAVENOUS
Status: DISCONTINUED | OUTPATIENT
Start: 2019-05-16 | End: 2019-05-17 | Stop reason: HOSPADM

## 2019-05-16 RX ORDER — ONDANSETRON 2 MG/ML
INJECTION INTRAMUSCULAR; INTRAVENOUS PRN
Status: DISCONTINUED | OUTPATIENT
Start: 2019-05-16 | End: 2019-05-16 | Stop reason: SDUPTHER

## 2019-05-16 RX ORDER — TIZANIDINE 2 MG/1
2 TABLET ORAL 2 TIMES DAILY
Status: DISCONTINUED | OUTPATIENT
Start: 2019-05-16 | End: 2019-05-17 | Stop reason: HOSPADM

## 2019-05-16 RX ORDER — SODIUM CHLORIDE 0.9 % (FLUSH) 0.9 %
10 SYRINGE (ML) INJECTION EVERY 12 HOURS SCHEDULED
Status: DISCONTINUED | OUTPATIENT
Start: 2019-05-16 | End: 2019-05-16 | Stop reason: HOSPADM

## 2019-05-16 RX ORDER — SODIUM CHLORIDE 1000 MG
1 TABLET, SOLUBLE MISCELLANEOUS DAILY
Status: DISCONTINUED | OUTPATIENT
Start: 2019-05-16 | End: 2019-05-17 | Stop reason: HOSPADM

## 2019-05-16 RX ORDER — LOSARTAN POTASSIUM 100 MG/1
100 TABLET ORAL DAILY
Status: DISCONTINUED | OUTPATIENT
Start: 2019-05-16 | End: 2019-05-17 | Stop reason: HOSPADM

## 2019-05-16 RX ORDER — VARENICLINE TARTRATE 1 MG/1
1 TABLET, FILM COATED ORAL 2 TIMES DAILY
Status: DISCONTINUED | OUTPATIENT
Start: 2019-05-16 | End: 2019-05-17 | Stop reason: HOSPADM

## 2019-05-16 RX ORDER — DEXAMETHASONE SODIUM PHOSPHATE 10 MG/ML
INJECTION INTRAMUSCULAR; INTRAVENOUS PRN
Status: DISCONTINUED | OUTPATIENT
Start: 2019-05-16 | End: 2019-05-16 | Stop reason: SDUPTHER

## 2019-05-16 RX ORDER — SODIUM CHLORIDE 0.9 % (FLUSH) 0.9 %
10 SYRINGE (ML) INJECTION EVERY 12 HOURS SCHEDULED
Status: DISCONTINUED | OUTPATIENT
Start: 2019-05-16 | End: 2019-05-17 | Stop reason: HOSPADM

## 2019-05-16 RX ORDER — LABETALOL HYDROCHLORIDE 5 MG/ML
10 INJECTION, SOLUTION INTRAVENOUS
Status: DISCONTINUED | OUTPATIENT
Start: 2019-05-16 | End: 2019-05-17 | Stop reason: HOSPADM

## 2019-05-16 RX ORDER — SODIUM CHLORIDE 9 MG/ML
INJECTION, SOLUTION INTRAVENOUS CONTINUOUS
Status: DISCONTINUED | OUTPATIENT
Start: 2019-05-17 | End: 2019-05-16

## 2019-05-16 RX ORDER — NEOSTIGMINE METHYLSULFATE 5 MG/5 ML
SYRINGE (ML) INTRAVENOUS PRN
Status: DISCONTINUED | OUTPATIENT
Start: 2019-05-16 | End: 2019-05-16 | Stop reason: SDUPTHER

## 2019-05-16 RX ORDER — ALBUTEROL SULFATE 90 UG/1
2 AEROSOL, METERED RESPIRATORY (INHALATION) EVERY 4 HOURS PRN
Status: DISCONTINUED | OUTPATIENT
Start: 2019-05-16 | End: 2019-05-17 | Stop reason: HOSPADM

## 2019-05-16 RX ORDER — FENTANYL CITRATE 50 UG/ML
INJECTION, SOLUTION INTRAMUSCULAR; INTRAVENOUS PRN
Status: DISCONTINUED | OUTPATIENT
Start: 2019-05-16 | End: 2019-05-16 | Stop reason: SDUPTHER

## 2019-05-16 RX ORDER — PREGABALIN 100 MG/1
100 CAPSULE ORAL 2 TIMES DAILY
Status: DISCONTINUED | OUTPATIENT
Start: 2019-05-16 | End: 2019-05-17 | Stop reason: HOSPADM

## 2019-05-16 RX ORDER — CEFAZOLIN SODIUM 1 G/3ML
INJECTION, POWDER, FOR SOLUTION INTRAMUSCULAR; INTRAVENOUS PRN
Status: DISCONTINUED | OUTPATIENT
Start: 2019-05-16 | End: 2019-05-16 | Stop reason: SDUPTHER

## 2019-05-16 RX ORDER — PANTOPRAZOLE SODIUM 40 MG/1
40 TABLET, DELAYED RELEASE ORAL
Status: DISCONTINUED | OUTPATIENT
Start: 2019-05-17 | End: 2019-05-17 | Stop reason: HOSPADM

## 2019-05-16 RX ORDER — ONDANSETRON 4 MG/1
4 TABLET, ORALLY DISINTEGRATING ORAL EVERY 6 HOURS PRN
Status: DISCONTINUED | OUTPATIENT
Start: 2019-05-16 | End: 2019-05-17 | Stop reason: HOSPADM

## 2019-05-16 RX ORDER — PROPOFOL 10 MG/ML
INJECTION, EMULSION INTRAVENOUS PRN
Status: DISCONTINUED | OUTPATIENT
Start: 2019-05-16 | End: 2019-05-16 | Stop reason: SDUPTHER

## 2019-05-16 RX ORDER — TIZANIDINE 4 MG/1
4 TABLET ORAL NIGHTLY
Status: DISCONTINUED | OUTPATIENT
Start: 2019-05-16 | End: 2019-05-17 | Stop reason: HOSPADM

## 2019-05-16 RX ORDER — HEPARIN SODIUM 1000 [USP'U]/ML
INJECTION, SOLUTION INTRAVENOUS; SUBCUTANEOUS PRN
Status: DISCONTINUED | OUTPATIENT
Start: 2019-05-16 | End: 2019-05-16 | Stop reason: SDUPTHER

## 2019-05-16 RX ORDER — TRAMADOL HYDROCHLORIDE 50 MG/1
50 TABLET ORAL EVERY 6 HOURS PRN
Status: DISCONTINUED | OUTPATIENT
Start: 2019-05-16 | End: 2019-05-17 | Stop reason: HOSPADM

## 2019-05-16 RX ORDER — GLYCOPYRROLATE 1 MG/5 ML
SYRINGE (ML) INTRAVENOUS PRN
Status: DISCONTINUED | OUTPATIENT
Start: 2019-05-16 | End: 2019-05-16 | Stop reason: SDUPTHER

## 2019-05-16 RX ORDER — SIMVASTATIN 20 MG
20 TABLET ORAL NIGHTLY
Status: DISCONTINUED | OUTPATIENT
Start: 2019-05-16 | End: 2019-05-17 | Stop reason: HOSPADM

## 2019-05-16 RX ORDER — NIACIN 500 MG/1
1000 TABLET, EXTENDED RELEASE ORAL NIGHTLY
Status: DISCONTINUED | OUTPATIENT
Start: 2019-05-16 | End: 2019-05-17 | Stop reason: HOSPADM

## 2019-05-16 RX ORDER — SODIUM CHLORIDE, SODIUM LACTATE, POTASSIUM CHLORIDE, CALCIUM CHLORIDE 600; 310; 30; 20 MG/100ML; MG/100ML; MG/100ML; MG/100ML
INJECTION, SOLUTION INTRAVENOUS CONTINUOUS
Status: DISCONTINUED | OUTPATIENT
Start: 2019-05-17 | End: 2019-05-16

## 2019-05-16 RX ORDER — ROCURONIUM BROMIDE 10 MG/ML
INJECTION, SOLUTION INTRAVENOUS PRN
Status: DISCONTINUED | OUTPATIENT
Start: 2019-05-16 | End: 2019-05-16 | Stop reason: SDUPTHER

## 2019-05-16 RX ADMIN — MORPHINE SULFATE 2 MG: 2 INJECTION, SOLUTION INTRAMUSCULAR; INTRAVENOUS at 14:58

## 2019-05-16 RX ADMIN — DEXTROSE MONOHYDRATE 2 G: 50 INJECTION, SOLUTION INTRAVENOUS at 19:06

## 2019-05-16 RX ADMIN — ACETAMINOPHEN 1000 MG: 500 TABLET ORAL at 14:24

## 2019-05-16 RX ADMIN — DEXAMETHASONE SODIUM PHOSPHATE 10 MG: 10 INJECTION INTRAMUSCULAR; INTRAVENOUS at 10:00

## 2019-05-16 RX ADMIN — SODIUM CHLORIDE, POTASSIUM CHLORIDE, SODIUM LACTATE AND CALCIUM CHLORIDE: 600; 310; 30; 20 INJECTION, SOLUTION INTRAVENOUS at 09:46

## 2019-05-16 RX ADMIN — MORPHINE SULFATE 2 MG: 2 INJECTION, SOLUTION INTRAMUSCULAR; INTRAVENOUS at 18:51

## 2019-05-16 RX ADMIN — SODIUM CHLORIDE, POTASSIUM CHLORIDE, SODIUM LACTATE AND CALCIUM CHLORIDE: 600; 310; 30; 20 INJECTION, SOLUTION INTRAVENOUS at 08:23

## 2019-05-16 RX ADMIN — ONDANSETRON 4 MG: 2 INJECTION INTRAMUSCULAR; INTRAVENOUS at 23:45

## 2019-05-16 RX ADMIN — PROPOFOL 80 MG: 10 INJECTION, EMULSION INTRAVENOUS at 09:52

## 2019-05-16 RX ADMIN — ROCURONIUM BROMIDE 40 MG: 10 INJECTION, SOLUTION INTRAVENOUS at 09:52

## 2019-05-16 RX ADMIN — Medication 50 MCG: at 10:17

## 2019-05-16 RX ADMIN — SIMVASTATIN 20 MG: 20 TABLET, FILM COATED ORAL at 20:46

## 2019-05-16 RX ADMIN — SODIUM CHLORIDE, POTASSIUM CHLORIDE, SODIUM LACTATE AND CALCIUM CHLORIDE: 600; 310; 30; 20 INJECTION, SOLUTION INTRAVENOUS at 09:40

## 2019-05-16 RX ADMIN — Medication 10 ML: at 23:45

## 2019-05-16 RX ADMIN — Medication 0.6 MG: at 11:50

## 2019-05-16 RX ADMIN — HEPARIN SODIUM 8000 UNITS: 1000 INJECTION, SOLUTION INTRAVENOUS; SUBCUTANEOUS at 10:43

## 2019-05-16 RX ADMIN — MORPHINE SULFATE 2 MG: 2 INJECTION, SOLUTION INTRAMUSCULAR; INTRAVENOUS at 22:11

## 2019-05-16 RX ADMIN — DEXTROSE MONOHYDRATE 2 G: 50 INJECTION, SOLUTION INTRAVENOUS at 23:45

## 2019-05-16 RX ADMIN — PREGABALIN 100 MG: 100 CAPSULE ORAL at 20:46

## 2019-05-16 RX ADMIN — Medication 50 MCG: at 09:52

## 2019-05-16 RX ADMIN — LOSARTAN POTASSIUM 100 MG: 100 TABLET, FILM COATED ORAL at 15:32

## 2019-05-16 RX ADMIN — ATENOLOL 12.5 MG: 25 TABLET ORAL at 20:46

## 2019-05-16 RX ADMIN — SODIUM CHLORIDE, POTASSIUM CHLORIDE, SODIUM LACTATE AND CALCIUM CHLORIDE: 600; 310; 30; 20 INJECTION, SOLUTION INTRAVENOUS at 14:15

## 2019-05-16 RX ADMIN — CEFAZOLIN 2000 MG: 1 INJECTION, POWDER, FOR SOLUTION INTRAMUSCULAR; INTRAVENOUS at 10:03

## 2019-05-16 RX ADMIN — TIZANIDINE 2 MG: 2 TABLET ORAL at 15:33

## 2019-05-16 RX ADMIN — SODIUM CHLORIDE, POTASSIUM CHLORIDE, SODIUM LACTATE AND CALCIUM CHLORIDE: 600; 310; 30; 20 INJECTION, SOLUTION INTRAVENOUS at 23:45

## 2019-05-16 RX ADMIN — PROTAMINE SULFATE 30 MG: 10 INJECTION, SOLUTION INTRAVENOUS at 11:46

## 2019-05-16 RX ADMIN — ONDANSETRON 4 MG: 2 INJECTION, SOLUTION INTRAMUSCULAR; INTRAVENOUS at 10:00

## 2019-05-16 RX ADMIN — PROPOFOL 60 MG: 10 INJECTION, EMULSION INTRAVENOUS at 10:19

## 2019-05-16 RX ADMIN — Medication 3 MG: at 11:50

## 2019-05-16 ASSESSMENT — PULMONARY FUNCTION TESTS
PIF_VALUE: 21
PIF_VALUE: 22
PIF_VALUE: 28
PIF_VALUE: 22
PIF_VALUE: 29
PIF_VALUE: 1
PIF_VALUE: 22
PIF_VALUE: 21
PIF_VALUE: 22
PIF_VALUE: 2
PIF_VALUE: 22
PIF_VALUE: 18
PIF_VALUE: 22
PIF_VALUE: 21
PIF_VALUE: 22
PIF_VALUE: 21
PIF_VALUE: 20
PIF_VALUE: 21
PIF_VALUE: 1
PIF_VALUE: 22
PIF_VALUE: 3
PIF_VALUE: 21
PIF_VALUE: 20
PIF_VALUE: 21
PIF_VALUE: 22
PIF_VALUE: 2
PIF_VALUE: 1
PIF_VALUE: 22
PIF_VALUE: 21
PIF_VALUE: 1
PIF_VALUE: 21
PIF_VALUE: 21
PIF_VALUE: 22
PIF_VALUE: 1
PIF_VALUE: 30
PIF_VALUE: 22
PIF_VALUE: 2
PIF_VALUE: 22
PIF_VALUE: 22
PIF_VALUE: 21
PIF_VALUE: 17
PIF_VALUE: 20
PIF_VALUE: 1
PIF_VALUE: 22
PIF_VALUE: 21
PIF_VALUE: 22
PIF_VALUE: 23
PIF_VALUE: 1
PIF_VALUE: 17
PIF_VALUE: 22
PIF_VALUE: 22
PIF_VALUE: 0
PIF_VALUE: 21
PIF_VALUE: 2
PIF_VALUE: 9
PIF_VALUE: 22
PIF_VALUE: 20
PIF_VALUE: 2
PIF_VALUE: 22
PIF_VALUE: 22
PIF_VALUE: 21
PIF_VALUE: 22
PIF_VALUE: 21
PIF_VALUE: 21
PIF_VALUE: 17
PIF_VALUE: 21
PIF_VALUE: 23
PIF_VALUE: 21
PIF_VALUE: 22
PIF_VALUE: 22
PIF_VALUE: 21
PIF_VALUE: 22
PIF_VALUE: 23
PIF_VALUE: 22
PIF_VALUE: 22
PIF_VALUE: 20
PIF_VALUE: 21
PIF_VALUE: 22
PIF_VALUE: 23
PIF_VALUE: 17
PIF_VALUE: 21
PIF_VALUE: 20
PIF_VALUE: 1
PIF_VALUE: 23
PIF_VALUE: 21
PIF_VALUE: 21
PIF_VALUE: 19
PIF_VALUE: 0
PIF_VALUE: 22
PIF_VALUE: 17
PIF_VALUE: 21
PIF_VALUE: 1
PIF_VALUE: 29
PIF_VALUE: 22
PIF_VALUE: 22
PIF_VALUE: 3
PIF_VALUE: 19
PIF_VALUE: 21
PIF_VALUE: 22
PIF_VALUE: 1
PIF_VALUE: 22
PIF_VALUE: 21
PIF_VALUE: 21
PIF_VALUE: 0
PIF_VALUE: 22
PIF_VALUE: 20
PIF_VALUE: 22
PIF_VALUE: 1
PIF_VALUE: 22
PIF_VALUE: 21
PIF_VALUE: 22
PIF_VALUE: 17
PIF_VALUE: 22
PIF_VALUE: 1
PIF_VALUE: 7
PIF_VALUE: 22
PIF_VALUE: 21
PIF_VALUE: 22
PIF_VALUE: 22
PIF_VALUE: 21
PIF_VALUE: 1
PIF_VALUE: 21
PIF_VALUE: 22
PIF_VALUE: 27
PIF_VALUE: 20
PIF_VALUE: 1
PIF_VALUE: 22
PIF_VALUE: 7
PIF_VALUE: 18
PIF_VALUE: 23
PIF_VALUE: 21
PIF_VALUE: 21
PIF_VALUE: 23
PIF_VALUE: 1

## 2019-05-16 ASSESSMENT — PAIN DESCRIPTION - LOCATION
LOCATION: NECK

## 2019-05-16 ASSESSMENT — PAIN DESCRIPTION - PAIN TYPE
TYPE: ACUTE PAIN;SURGICAL PAIN
TYPE: ACUTE PAIN;SURGICAL PAIN
TYPE: SURGICAL PAIN;ACUTE PAIN

## 2019-05-16 ASSESSMENT — PAIN SCALES - GENERAL
PAINLEVEL_OUTOF10: 6
PAINLEVEL_OUTOF10: 3
PAINLEVEL_OUTOF10: 3
PAINLEVEL_OUTOF10: 0
PAINLEVEL_OUTOF10: 7
PAINLEVEL_OUTOF10: 0
PAINLEVEL_OUTOF10: 5
PAINLEVEL_OUTOF10: 5

## 2019-05-16 ASSESSMENT — PAIN DESCRIPTION - PROGRESSION
CLINICAL_PROGRESSION: NOT CHANGED
CLINICAL_PROGRESSION: NOT CHANGED
CLINICAL_PROGRESSION: GRADUALLY IMPROVING

## 2019-05-16 ASSESSMENT — PAIN DESCRIPTION - ORIENTATION
ORIENTATION: LEFT

## 2019-05-16 ASSESSMENT — PAIN DESCRIPTION - FREQUENCY
FREQUENCY: CONTINUOUS

## 2019-05-16 ASSESSMENT — PAIN - FUNCTIONAL ASSESSMENT
PAIN_FUNCTIONAL_ASSESSMENT: PREVENTS OR INTERFERES SOME ACTIVE ACTIVITIES AND ADLS
PAIN_FUNCTIONAL_ASSESSMENT: PREVENTS OR INTERFERES SOME ACTIVE ACTIVITIES AND ADLS
PAIN_FUNCTIONAL_ASSESSMENT: 0-10
PAIN_FUNCTIONAL_ASSESSMENT: PREVENTS OR INTERFERES SOME ACTIVE ACTIVITIES AND ADLS

## 2019-05-16 ASSESSMENT — PAIN DESCRIPTION - ONSET
ONSET: ON-GOING

## 2019-05-16 ASSESSMENT — PAIN DESCRIPTION - DESCRIPTORS
DESCRIPTORS: SHARP;ACHING

## 2019-05-16 NOTE — ANESTHESIA PRE PROCEDURE
Department of Anesthesiology  Preprocedure Note       Name:  Remy Yap   Age:  79 y.o.  :  1950                                          MRN:  [id number]         Date:  2019      Surgeon: Mario Alberto Moreno):  Juan Ramon Sanchez MD    Procedure: CAROTID ENDARTERECTOMY LEFT (Left )    Medications prior to admission:   Prior to Admission medications    Medication Sig Start Date End Date Taking? Authorizing Provider   pregabalin (LYRICA) 100 MG capsule Take 100 mg by mouth 2 times daily. Yes Historical Provider, MD   aspirin 81 MG EC tablet Take 81 mg by mouth daily   Yes Historical Provider, MD   sodium chloride 1 g tablet Take 1 g by mouth daily   Yes Historical Provider, MD   tiZANidine (ZANAFLEX) 2 MG tablet Take 2 mg by mouth 2 times daily   Yes Historical Provider, MD   fluticasone-salmeterol (ADVAIR) 250-50 MCG/DOSE AEPB Inhale 1 puff into the lungs every 12 hours. Yes Historical Provider, MD   losartan (COZAAR) 100 MG tablet Take 100 mg by mouth daily. Yes Historical Provider, MD   fluticasone (FLONASE) 50 MCG/ACT nasal spray 2 sprays by Nasal route daily. Yes Historical Provider, MD   levothyroxine (SYNTHROID) 50 MCG tablet Take 50 mcg by mouth Daily. Yes Historical Provider, MD   omeprazole (PRILOSEC) 40 MG delayed release capsule Take 40 mg by mouth daily. Yes Historical Provider, MD   ALPRAZolam Hermelindo Kaska) 1 MG tablet Take 1 mg by mouth 3 times daily as needed for Sleep or Anxiety. .   Yes Historical Provider, MD   traMADol (ULTRAM) 50 MG tablet Take 50 mg by mouth 4 times daily as needed for Pain. Yes Historical Provider, MD   atenolol (TENORMIN) 25 MG tablet Take 12.5 mg by mouth 2 times daily    Yes Historical Provider, MD   simvastatin (ZOCOR) 20 MG tablet Take 20 mg by mouth nightly. Yes Historical Provider, MD   niacin (NIASPAN) 500 MG CR tablet Take 1,000 mg by mouth nightly. Yes Historical Provider, MD   Cholecalciferol (VITAMIN D) 2000 UNITS TABS Take  by mouth.    Yes Historical Provider, MD   tiZANidine (ZANAFLEX) 4 MG tablet Take 4 mg by mouth nightly Also 2 times daily order    Historical Provider, MD   alendronate (FOSAMAX) 35 MG tablet Take 35 mg by mouth every 7 days    Historical Provider, MD   varenicline (CHANTIX) 1 MG tablet Take 1 mg by mouth 2 times daily    Historical Provider, MD   fexofenadine (ALLEGRA) 180 MG tablet Take 180 mg by mouth daily    Historical Provider, MD   rivaroxaban (XARELTO) 20 MG TABS tablet Take 20 mg by mouth nightly     Historical Provider, MD   albuterol (PROVENTIL HFA;VENTOLIN HFA) 108 (90 BASE) MCG/ACT inhaler Inhale 2 puffs into the lungs every 4 hours as needed for Wheezing. Historical Provider, MD   cyanocobalamin 1000 MCG/ML injection Inject 1,000 mcg into the muscle once. Patient takes monthly    Historical Provider, MD   folic acid (FOLVITE) 1 MG tablet Take 1 mg by mouth daily. Historical Provider, MD       Current medications:    Current Facility-Administered Medications   Medication Dose Route Frequency Provider Last Rate Last Dose    [START ON 5/17/2019] lactated ringers infusion   Intravenous Continuous Smitha Rebolledo  mL/hr at 05/16/19 8396      sodium chloride flush 0.9 % injection 10 mL  10 mL Intravenous 2 times per day Rao Schaffer DO        sodium chloride flush 0.9 % injection 10 mL  10 mL Intravenous PRN Smitha Rebolledo DO        [START ON 5/17/2019] lidocaine PF 1 % injection 1 mL  1 mL Intradermal Once PRN Rao Schaffer DO        ceFAZolin (ANCEF) 2 g in dextrose 3 % 50 mL IVPB (duplex)  2 g Intravenous Once Lj Quinones MD           Allergies:     Allergies   Allergen Reactions    Iv Contrast [Iodides] Other (See Comments)     Due to kidney problems       Problem List:    Patient Active Problem List   Diagnosis Code    S/P laparoscopic cholecystectomy Z90.49    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       Past Medical History:        Diagnosis Date    A-fib ([facility] [address]     Anxiety     Arthritis     Blood transfusion reaction     2001 with surgery    CAD (coronary artery disease)     Carotid stenosis     COPD (chronic obstructive pulmonary disease) (HCC)     GERD (gastroesophageal reflux disease)     Hepatitis B 1972    History of blood transfusion     Hyperlipidemia     Hypertension     Hypothyroid     Kidney disease     GFR 60, with mass lower left chamber and right upper   Stage 3    Legally blind     patient gets eye appointment    Macular edema 2013    eye injections    PVD (peripheral vascular disease) ([address]     Sleep apnea     CPAP    Subclavian artery stenosis ([address]        Past Surgical History:        Procedure Laterality Date    AORTO-FEMORAL BYPASS GRAFT      CARDIAC CATHETERIZATION  10/2012    no stents    CAROTID ENDARTERECTOMY      CATARACT REMOVAL Bilateral     CHOLECYSTECTOMY  2014    laparscopic    EYE SURGERY      LASIK LEFT EYE    EYE SURGERY      catarat extraction    TOE AMPUTATION      gangrene     TONSILLECTOMY      TUMOR REMOVAL Left     carotid       Social History:    Social History     Tobacco Use    Smoking status: Former Smoker     Last attempt to quit: 2014     Years since quittin.0    Smokeless tobacco: Former User    Tobacco comment: USING E-CIG   Substance Use Topics    Alcohol use: No     Alcohol/week: 0.0 oz                                Counseling given: Not Answered  Comment: USING E-CIG      Vital Signs (Current):   Vitals:    19 0756 19 0758   BP: (!) 128/42    Pulse: 65    Resp: 18    Temp: 97.7 °F (36.5 °C)    TempSrc: Oral    SpO2: 98%    Weight:  174 lb (78.9 kg)   Height:  5' 4\" (1.626 m)                                              BP Readings from Last 3 Encounters:   19 (!) 128/42   19 (!) 117/50   19 (!) 108/57       NPO Status: Time of last liquid consumption:                         Time of last solid consumption:  Anesthesia Plan      general     ASA 3       Induction: intravenous. arterial line  MIPS: Postoperative opioids intended and Prophylactic antiemetics administered. Anesthetic plan and risks discussed with patient. Use of blood products discussed with patient whom consented to blood products. Plan discussed with attending and CRNA.     Attending anesthesiologist reviewed and agrees with Evelyn Sloan MD   5/16/2019

## 2019-05-16 NOTE — ANESTHESIA POSTPROCEDURE EVALUATION
Department of Anesthesiology  Postprocedure Note    Patient: Renetta Wadsworth  MRN: [id number]  YOB: 1950  Date of evaluation: 5/16/2019  Time:  3:31 PM     Procedure Summary     Date:  05/16/19 Room / Location:  [facility] OR 01 / [facility] OR    Anesthesia Start:  0946 Anesthesia Stop:  0716    Procedure:  CAROTID ENDARTERECTOMY LEFT (Left Neck) Diagnosis:  (CAROTID STENOSIS LEFT)    Surgeon:  Edgard Mg MD Responsible Provider:  Josue Sargent MD    Anesthesia Type:  general ASA Status:  3          Anesthesia Type: general    Gracy Phase I: Gracy Score: 9    Gracy Phase II:      Last vitals: Reviewed and per EMR flowsheets.        Anesthesia Post Evaluation    Patient location during evaluation: PACU  Patient participation: complete - patient participated  Level of consciousness: awake  Pain score: 0  Airway patency: patent  Nausea & Vomiting: no nausea and no vomiting  Complications: no  Cardiovascular status: blood pressure returned to baseline  Respiratory status: acceptable  Hydration status: euvolemic

## 2019-05-16 NOTE — BRIEF OP NOTE
Brief Postoperative Note  ______________________________________________________________    Patient: Alexia Olmedo  YOB: 1950  MRN: [id number]  Date of Procedure: 5/16/2019    Pre-Op Diagnosis: CAROTID STENOSIS LEFT    Post-Op Diagnosis: Same       Procedure(s):  LEFT CAROTID ENDARTERECTOMY    Anesthesia: General    Surgeon(s):  Khoa Mcintosh MD    Assistant: Ronal Davis PGY3    Estimated Blood Loss (mL): 25 cc    Complications: None    Specimens:   ID Type Source Tests Collected by Time Destination   A : LEFT CAROTID PLAQUE Tissue Carotid Arteries SURGICAL PATHOLOGY Khoa Mcintosh MD 5/16/2019 1144        Implants:  Implant Name Type Inv. Item Serial No.  Lot No. LRB No. Used   LUPILLO-PATCH BOVINE VASC [id number] Vascular/Graft/Patch/Filter [facility] BOVINE VASC 0.8X8CM  [employer] [id number] Left 1           Findings: left carotid stenosis of left internal carotid just distal to the bifurcation. Neurologically intact post operatively.     Osman Segundo DO  Date: 5/16/2019  Time: 12:07 PM

## 2019-05-16 NOTE — INTERVAL H&P NOTE
History and Physical Update    Pt Name: Richard Linda  MRN: [id number]  YOB: 1950  Date of evaluation: 5/16/2019      [x] I have reviewed the H&P which meets the criteria for an Interval History and Physical note    [x] I have examined  Richard Linda   There are no changes to the patient who is scheduled for a Left Carotid endarterectomy by Dr Jose Manuel Sloan for carotid stenosis. The patient denies health changes, fever, chills, productive cough, open sores or wounds. Denies increased SOB and has not rescue used inhaler     Hx CAD with A Fib on long term anticoagulants  Hardcopy cardiac clearance by Dr Jamie Alexandre 4/22/19 in short chart. Last Xarelto 5/12/19 and ASA 81mg 5/15/19  Denies sudden speech/vision changes or extremity weakness  No chest pain, palpitations, dizziness, lightheadedness or syncope. .    Vital signs: BP (!) 128/42   Pulse 65   Temp 97.7 °F (36.5 °C) (Oral)   Resp 18   Ht 5' 4\" (1.626 m)   Wt 174 lb (78.9 kg)   SpO2 98%   BMI 29.87 kg/m²     Allergies: Iv contrast [iodides]    Medications:    Prior to Admission medications    Medication Sig Start Date End Date Taking? Authorizing Provider   tiZANidine (ZANAFLEX) 4 MG tablet Take 4 mg by mouth nightly Also 2 times daily order    Historical Provider, MD   pregabalin (LYRICA) 100 MG capsule Take 100 mg by mouth 2 times daily.     Historical Provider, MD   alendronate (FOSAMAX) 35 MG tablet Take 35 mg by mouth every 7 days    Historical Provider, MD   aspirin 81 MG EC tablet Take 81 mg by mouth daily    Historical Provider, MD   varenicline (CHANTIX) 1 MG tablet Take 1 mg by mouth 2 times daily    Historical Provider, MD   fexofenadine (ALLEGRA) 180 MG tablet Take 180 mg by mouth daily    Historical Provider, MD   sodium chloride 1 g tablet Take 1 g by mouth daily    Historical Provider, MD   tiZANidine (ZANAFLEX) 2 MG tablet Take 2 mg by mouth 2 times daily    Historical Provider, MD   rivaroxaban (XARELTO) 20 MG TABS tablet Take 20 mg by wheezes or rales bilaterally   Abdomen: Round, soft, nontender, nondistended with bowel sounds . Neuro: II-XII cranial nerves grossly intact.  Strength equal bilaterally       Labs:  Recent Labs     04/30/19  1559   HGB 12.4   HCT 38.8   WBC 8.5   MCV 97.5         K 3.9      CO2 25   BUN 18   CREATININE 1.19*   INR 1.3   PROTIME 13.4*   APTT 34.1*   AST 22   ALT 16   LABALBU 4.4       Katheryn Brewer  APRN, ANP-BC  Electronically signed 5/16/2019 at 8:03 AM

## 2019-05-16 NOTE — PROGRESS NOTES
GLASSES PLACED IN PATIENT BELONGINGS BAG. DENTURES PLACED IN CONTAINER WITH PATIENT LABEL AND KEPT WITH CHART.

## 2019-05-16 NOTE — FLOWSHEET NOTE
05/16/19 1400   Incision 05/16/19 Neck Left   Date First Assessed/Time First Assessed: 05/16/19 1027   Location: Neck  Wound Location Orientation: Left   Wound Assessment Clean;Dry;Edges well approximated   Paty-wound Assessment Clean;Dry; Intact   Closure Surgical glue   Drainage Amount None   Odor None   Dressing/Treatment Surgical glue   Dressing Status Other (Comment)  (open to air)   When admitted, pt had small soft raised area of bruising around incision site. Per report, this is stable and MD aware. Writer will continue to monitor.

## 2019-05-17 VITALS
SYSTOLIC BLOOD PRESSURE: 134 MMHG | DIASTOLIC BLOOD PRESSURE: 109 MMHG | BODY MASS INDEX: 30.58 KG/M2 | OXYGEN SATURATION: 95 % | TEMPERATURE: 98.1 F | RESPIRATION RATE: 18 BRPM | HEART RATE: 86 BPM | WEIGHT: 179.1 LBS | HEIGHT: 64 IN

## 2019-05-17 LAB
ANION GAP SERPL CALCULATED.3IONS-SCNC: 11 MMOL/L (ref 9–17)
BUN BLDV-MCNC: 14 MG/DL (ref 8–23)
BUN/CREAT BLD: 12 (ref 9–20)
CALCIUM SERPL-MCNC: 8.4 MG/DL (ref 8.6–10.4)
CHLORIDE BLD-SCNC: 96 MMOL/L (ref 98–107)
CO2: 23 MMOL/L (ref 20–31)
CREAT SERPL-MCNC: 1.18 MG/DL (ref 0.5–0.9)
GFR AFRICAN AMERICAN: 56 ML/MIN
GFR NON-AFRICAN AMERICAN: 46 ML/MIN
GFR SERPL CREATININE-BSD FRML MDRD: ABNORMAL ML/MIN/{1.73_M2}
GFR SERPL CREATININE-BSD FRML MDRD: ABNORMAL ML/MIN/{1.73_M2}
GLUCOSE BLD-MCNC: 154 MG/DL (ref 70–99)
HCT VFR BLD CALC: 30.7 % (ref 36.3–47.1)
HEMOGLOBIN: 10.1 G/DL (ref 11.9–15.1)
POTASSIUM SERPL-SCNC: 4.4 MMOL/L (ref 3.7–5.3)
SODIUM BLD-SCNC: 130 MMOL/L (ref 135–144)
SURGICAL PATHOLOGY REPORT: NORMAL

## 2019-05-17 PROCEDURE — 85018 HEMOGLOBIN: CPT

## 2019-05-17 PROCEDURE — 2580000003 HC RX 258: Performed by: STUDENT IN AN ORGANIZED HEALTH CARE EDUCATION/TRAINING PROGRAM

## 2019-05-17 PROCEDURE — 6360000002 HC RX W HCPCS: Performed by: STUDENT IN AN ORGANIZED HEALTH CARE EDUCATION/TRAINING PROGRAM

## 2019-05-17 PROCEDURE — 85014 HEMATOCRIT: CPT

## 2019-05-17 PROCEDURE — 6370000000 HC RX 637 (ALT 250 FOR IP): Performed by: STUDENT IN AN ORGANIZED HEALTH CARE EDUCATION/TRAINING PROGRAM

## 2019-05-17 PROCEDURE — 80048 BASIC METABOLIC PNL TOTAL CA: CPT

## 2019-05-17 RX ORDER — HYDROCODONE BITARTRATE AND ACETAMINOPHEN 5; 325 MG/1; MG/1
1 TABLET ORAL EVERY 6 HOURS PRN
Qty: 18 TABLET | Refills: 0 | Status: SHIPPED | OUTPATIENT
Start: 2019-05-17 | End: 2019-05-20

## 2019-05-17 RX ADMIN — MORPHINE SULFATE 2 MG: 2 INJECTION, SOLUTION INTRAMUSCULAR; INTRAVENOUS at 04:22

## 2019-05-17 RX ADMIN — PREGABALIN 100 MG: 100 CAPSULE ORAL at 08:27

## 2019-05-17 RX ADMIN — LEVOTHYROXINE SODIUM 50 MCG: 50 TABLET ORAL at 08:32

## 2019-05-17 RX ADMIN — PANTOPRAZOLE SODIUM 40 MG: 40 TABLET, DELAYED RELEASE ORAL at 08:32

## 2019-05-17 RX ADMIN — SODIUM CHLORIDE TAB 1 GM 1 G: 1 TAB at 08:27

## 2019-05-17 RX ADMIN — FLUTICASONE PROPIONATE 2 SPRAY: 50 SPRAY, METERED NASAL at 08:28

## 2019-05-17 RX ADMIN — TRAMADOL HYDROCHLORIDE 50 MG: 50 TABLET, FILM COATED ORAL at 10:04

## 2019-05-17 RX ADMIN — ASPIRIN 81 MG: 81 TABLET, COATED ORAL at 08:27

## 2019-05-17 RX ADMIN — Medication 10 ML: at 08:28

## 2019-05-17 RX ADMIN — TIZANIDINE 2 MG: 2 TABLET ORAL at 08:27

## 2019-05-17 RX ADMIN — FOLIC ACID 1 MG: 1 TABLET ORAL at 08:27

## 2019-05-17 RX ADMIN — ATENOLOL 12.5 MG: 25 TABLET ORAL at 08:28

## 2019-05-17 RX ADMIN — LOSARTAN POTASSIUM 100 MG: 100 TABLET, FILM COATED ORAL at 08:27

## 2019-05-17 ASSESSMENT — PAIN DESCRIPTION - DESCRIPTORS
DESCRIPTORS: SHARP
DESCRIPTORS: SHARP

## 2019-05-17 ASSESSMENT — PAIN DESCRIPTION - PAIN TYPE
TYPE: ACUTE PAIN;SURGICAL PAIN
TYPE: ACUTE PAIN;SURGICAL PAIN

## 2019-05-17 ASSESSMENT — PAIN SCALES - GENERAL
PAINLEVEL_OUTOF10: 5

## 2019-05-17 ASSESSMENT — PAIN DESCRIPTION - FREQUENCY
FREQUENCY: CONTINUOUS
FREQUENCY: CONTINUOUS

## 2019-05-17 ASSESSMENT — PAIN DESCRIPTION - PROGRESSION
CLINICAL_PROGRESSION: GRADUALLY IMPROVING
CLINICAL_PROGRESSION: GRADUALLY IMPROVING

## 2019-05-17 ASSESSMENT — PAIN - FUNCTIONAL ASSESSMENT
PAIN_FUNCTIONAL_ASSESSMENT: PREVENTS OR INTERFERES SOME ACTIVE ACTIVITIES AND ADLS
PAIN_FUNCTIONAL_ASSESSMENT: PREVENTS OR INTERFERES SOME ACTIVE ACTIVITIES AND ADLS

## 2019-05-17 ASSESSMENT — PAIN DESCRIPTION - LOCATION
LOCATION: NECK
LOCATION: NECK

## 2019-05-17 ASSESSMENT — PAIN DESCRIPTION - ONSET
ONSET: ON-GOING
ONSET: ON-GOING

## 2019-05-17 ASSESSMENT — PAIN DESCRIPTION - ORIENTATION
ORIENTATION: LEFT
ORIENTATION: LEFT

## 2019-05-17 NOTE — PLAN OF CARE
Patient active participant in control of pain, states interventions are adequately meeting patient's needs for the duration of the shift. Pt will exhibit no s/s of overt or covert bleeds and will remain hemodynamically stable for the duration of the shift. Pt remains free of falls and verbalizes understanding of individual fall risks. Pt wearing non skid footwear, bed locked and in lowest position. Side rails up 2/4. Pt has call light and tray table within reach and encouraged to seek out staff for any assistance needed. Pt uses call light appropriately. No new insults to physical integrity noted for the duration of the shift.

## 2019-05-17 NOTE — FLOWSHEET NOTE
Patient sitting in chair; states she expects to be discharged later today; expresses no spiritual/emotional needs at this time; thanks writer for visiting. Writer provides presence and support. Spiritual Care will follow as needed.      05/17/19 1119   Encounter Summary   Services provided to: Patient   Referral/Consult From: [facility]   [facility] None   Continue Visiting   (5/17/19)   Complexity of Encounter Low   Length of Encounter 15 minutes   Spiritual Assessment Completed Yes   Routine   Type Initial   Assessment Passive   Intervention Active listening   Outcome Expressed gratitude

## 2019-05-17 NOTE — OP NOTE
was used to corry the anterior portion of the  sternocleidomastoid muscle and using a 15 blade, a 10 cm incision was made in  the skin and dermis of already marked incision. This incision was  carried down through the subcutaneous tissue with electrocautery until  the platysma muscle was identified and divided. Electrocautery was used  to continue the incision down until the sternocleidomastoid muscle was  identified. Carefully, a tedious dissection was performed anterior to the  sternocleidomastoid until the internal jugular vein was identified. The  internal jugular vein was circumferentially dissected and mobilized  laterally with a Weitlaner. Next, the facial vein was identified off  the internal jugular vein and suture ligated, clipped and divided. This allowed access to the common carotid artery, which was easily  identified and circumferentially dissected. A Maxi loop was placed  around the common carotid artery. Dissection continued superiorly until  the bifurcation of the carotid artery was identified. The internal and  external carotid arteries were circumferentially dissected and 2 clamps  were placed in position. Prior to fully clamping, 8000 units of heparin  was given to the patient and a 3-minute waiting period was performed. After the 3-minute waiting period for heparin to circulate, the  internal, external and common carotid arteries were clamped. Using an  11 blade, an arteriotomy was made along the common carotid artery and  extended up to and into the internal carotid artery. There was clearly  noted to be a newly formed plaque in the internal carotid artery. A shunt was placed within the internal  carotid artery and the common carotid artery. Using  an elevator and tedious dissection, the entire plaque was removed until  it feathered out both proximally and distally. Careful dissection was  taken to remove the plaque from the external carotid artery.   Once the  majority of the [place]

## 2019-05-17 NOTE — CARE COORDINATION
Case Management Initial Discharge Plan  Lindsey Kimmie,         Readmission Risk              Risk of Unplanned Readmission:        14             Met with:patient to discuss discharge plans. Information verified: address, contacts, phone number, , insurance Yes  PCP: Jose Diane MD  Date of last visit: 3 months    Insurance Provider: medicare    Discharge Planning  Current Residence:  Mobile home  Living Arrangements:    lives with her grandson  Home has 1 stories/3 stairs to climb  Support Systems:   HHA and family  Current Services PTA:   [facility] 3 hrs 3 x week Agency:  [facility]  Patient able to perform ADL's:Assisted  DME in home:  Walker, w/c, cane, shower chair, cpap, EMS system  DME used to aid ambulation prior to admission:   Cane, walker  DME used during admission:  cpap    Potential Assistance Needed:       Pharmacy: [facility]   Potential Assistance Purchasing Medications:     Does patient want to participate in local refill/ meds to beds program?       Patient agreeable to home care: Yes  Freedom of choice provided:  yes      Type of Home Care Services:   HHA 3 hrs 3 x week  Patient expects to be discharged to:   home    Prior SNF/Rehab Placement and Facility:  none  Agreeable to SNF/Rehab: No  Liberty of choice provided: n/a   Evaluation: no    Expected Discharge date:     Follow Up Appointment: Best Day/ Time:      Transportation provider: Family, HHA or [employer]  Transportation arrangements needed for discharge: No    Discharge Plan: Met with pt at bedside. S/p left CEA. Pt lives in mobile home, grandson lives with her. Pt has current [facility]HA services 3 hrs 3 x week with [facility]. Services verified. Plan for HHA to see pt today 12-3. No other dc needs. Called and scheduled f/u appt  at 10:45 am.  Appt added to dc instructions.              Electronically signed by Bia Quintero RN on 19 at 9:48 AM

## 2019-05-17 NOTE — PROGRESS NOTES
Discharge Note:       Pt wheeled out to front discharge doors at this time. Pt left premises without any issues in private vehicle at this time.

## 2019-05-17 NOTE — PROGRESS NOTES
Vascular Surgery   Progress Note  Post-Op Carotid Endarterectomy    5/17/2019  9:10 AM     Kalyn Montemayor    1950   [id number]        SUBJECTIVE:  Patient awake and alert. No complaints. Good pain control. Denies nausea. OBJECTIVE    Physical  VITALS:  BP (!) 130/55   Pulse 83   Temp 98.1 °F (36.7 °C) (Temporal)   Resp 18   Ht 5' 4\" (1.626 m)   Wt 179 lb 1.6 oz (81.2 kg)   SpO2 (!) 88%   BMI 30.74 kg/m²     CONSTITUTIONAL:  awake, alert, cooperative, no apparent distress and appears stated age  NECK: Incision clean and dry. Left neck with swelling but no bruising or hematoma. NEUROLOGIC:  Mental status unchanged. Motor and sensory function intact. Tongue midline. Data  Hemoglobin   Date/Time Value Ref Range Status   05/17/2019 04:00 AM 10.1 (L) 11.9 - 15.1 g/dL Final     Hematocrit   Date/Time Value Ref Range Status   05/17/2019 04:00 AM 30.7 (L) 36.3 - 47.1 % Final     Sodium   Date/Time Value Ref Range Status   05/17/2019 04:00  (L) 135 - 144 mmol/L Final     Potassium   Date/Time Value Ref Range Status   05/17/2019 04:00 AM 4.4 3.7 - 5.3 mmol/L Final     Chloride   Date/Time Value Ref Range Status   05/17/2019 04:00 AM 96 (L) 98 - 107 mmol/L Final     CO2   Date/Time Value Ref Range Status   05/17/2019 04:00 AM 23 20 - 31 mmol/L Final     BUN   Date/Time Value Ref Range Status   05/17/2019 04:00 AM 14 8 - 23 mg/dL Final       ASSESSMENT AND PLAN    79 y.o. female doing well status post Right / Left Carotid Endarterectomy       Plan home today. Follow-up in Office in 2 weeks. Continue aspirin. Call for problems.     Electronically signed by Celestino Narvaez MD on 5/17/2019 at 9:10 AM

## 2019-05-17 NOTE — PROGRESS NOTES
Patient given discharge instructions verbal and written. Red folder includes discharge instructions with new medications and side effect information; norco rx also included in discharge folder. Patient verbalizes understanding. Patient discharged with all personal belongings with family member to home. See discharge event for time of discharge.

## 2019-05-18 NOTE — DISCHARGE SUMMARY
Physician Discharge Summary     Patient ID:  Anastasiia Tran  [id number]  16 y.o.  1950    Admit date: 5/16/2019    Discharge date and time: 5/17/2019 11:37 AM     Admitting Physician: Michael Fraser MD     Discharge Physician: Michael Fraser MD      Admission Diagnoses: S/P carotid endarterectomy [Z98.890]    Discharge Diagnoses: same    Admission Condition: good    Discharged Condition: good    Indication for Admission: left carotid surgery    Hospital Course: tolerated surgery well. Had some post op swelling but had previous neck surgery    Consults: none    Significant Diagnostic Studies:     Treatments: surgery: left CEA    Discharge Exam:  BP (!) 134/109   Pulse 86   Temp 98.1 °F (36.7 °C) (Temporal)   Resp 18   Ht 5' 4\" (1.626 m)   Wt 179 lb 1.6 oz (81.2 kg)   SpO2 95%   BMI 30.74 kg/m²   General appearance: alert, appears stated age and cooperative  Head: Normocephalic, without obvious abnormality, atraumatic  Neck: no adenopathy, no carotid bruit, no JVD, supple, symmetrical, trachea midline and thyroid not enlarged, symmetric, no tenderness/mass/nodules, mild left sided edema  Lungs: clear to auscultation bilaterally  Heart: regular rate and rhythm, S1, S2 normal, no murmur, click, rub or gallop  Abdomen: soft, non-tender; bowel sounds normal; no masses,  no organomegaly  Neurologic: Grossly normal    Disposition: home    In process/preliminary results:  Outstanding Order Results     No orders found from 4/17/2019 to 5/17/2019. Patient Instructions:   Discharge Medication List as of 5/17/2019 10:20 AM      START taking these medications    Details   HYDROcodone-acetaminophen (NORCO) 5-325 MG per tablet Take 1 tablet by mouth every 6 hours as needed for Pain for up to 3 days. Intended supply: 3 days.  Take lowest dose possible to manage pain, Disp-18 tablet, R-0Print         CONTINUE these medications which have NOT CHANGED    Details   pregabalin (LYRICA) 100 MG capsule Take 100 mg by mouth 2 times daily. Historical Med      aspirin 81 MG EC tablet Take 81 mg by mouth dailyHistorical Med      sodium chloride 1 g tablet Take 1 g by mouth dailyHistorical Med      !! tiZANidine (ZANAFLEX) 2 MG tablet Take 2 mg by mouth 2 times dailyHistorical Med      fluticasone-salmeterol (ADVAIR) 250-50 MCG/DOSE AEPB Inhale 1 puff into the lungs every 12 hours. losartan (COZAAR) 100 MG tablet Take 100 mg by mouth daily. fluticasone (FLONASE) 50 MCG/ACT nasal spray 2 sprays by Nasal route daily. levothyroxine (SYNTHROID) 50 MCG tablet Take 50 mcg by mouth Daily. omeprazole (PRILOSEC) 40 MG delayed release capsule Take 40 mg by mouth daily. Historical Med      ALPRAZolam (XANAX) 1 MG tablet Take 1 mg by mouth 3 times daily as needed for Sleep or Anxiety. Venkat Mazin Historical Med      traMADol (ULTRAM) 50 MG tablet Take 50 mg by mouth 4 times daily as needed for Pain. atenolol (TENORMIN) 25 MG tablet Take 12.5 mg by mouth 2 times daily Historical Med      simvastatin (ZOCOR) 20 MG tablet Take 20 mg by mouth nightly. niacin (NIASPAN) 500 MG CR tablet Take 1,000 mg by mouth nightly. Cholecalciferol (VITAMIN D) 2000 UNITS TABS Take  by mouth. !! tiZANidine (ZANAFLEX) 4 MG tablet Take 4 mg by mouth nightly Also 2 times daily orderHistorical Med      alendronate (FOSAMAX) 35 MG tablet Take 35 mg by mouth every 7 daysHistorical Med      varenicline (CHANTIX) 1 MG tablet Take 1 mg by mouth 2 times dailyHistorical Med      fexofenadine (ALLEGRA) 180 MG tablet Take 180 mg by mouth dailyHistorical Med      rivaroxaban (XARELTO) 20 MG TABS tablet Take 20 mg by mouth nightly Historical Med      albuterol (PROVENTIL HFA;VENTOLIN HFA) 108 (90 BASE) MCG/ACT inhaler Inhale 2 puffs into the lungs every 4 hours as needed for Wheezing. cyanocobalamin 1000 MCG/ML injection Inject 1,000 mcg into the muscle once. Patient takes monthly      folic acid (FOLVITE) 1 MG tablet Take 1 mg by mouth daily. !! - Potential duplicate medications found. Please discuss with provider. Activity: no lifting, Driving, or Strenuous exercise for 1 week  Diet: regular diet  Wound Care: as directed    Follow-up with Roxanne Jones MD  in 2 weeks.     Signed:  Roxanne Jones MD    5/18/2019  7:58 AM

## 2020-03-04 ENCOUNTER — HOSPITAL ENCOUNTER (OUTPATIENT)
Age: 70
Discharge: HOME | End: 2020-03-04
Payer: MEDICARE

## 2020-03-04 PROCEDURE — 80053 COMPREHEN METABOLIC PANEL: CPT

## 2020-03-04 PROCEDURE — 81003 URINALYSIS AUTO W/O SCOPE: CPT

## 2020-03-04 PROCEDURE — 85025 COMPLETE CBC W/AUTO DIFF WBC: CPT

## 2020-03-04 PROCEDURE — 36415 COLL VENOUS BLD VENIPUNCTURE: CPT

## 2020-03-04 PROCEDURE — 82306 VITAMIN D 25 HYDROXY: CPT

## 2020-03-04 PROCEDURE — 83970 ASSAY OF PARATHORMONE: CPT

## 2020-03-05 LAB
ABSOLUTE EOS #: 0.3 K/UL (ref 0–0.44)
ABSOLUTE IMMATURE GRANULOCYTE: <0.03 K/UL (ref 0–0.3)
ABSOLUTE LYMPH #: 3.12 K/UL (ref 1.1–3.7)
ABSOLUTE MONO #: 0.76 K/UL (ref 0.1–1.2)
ALBUMIN SERPL-MCNC: 4.4 G/DL (ref 3.5–5.2)
ALBUMIN/GLOBULIN RATIO: 1.3 (ref 1–2.5)
ALP BLD-CCNC: 96 U/L (ref 35–104)
ALT SERPL-CCNC: 8 U/L (ref 5–33)
ANION GAP SERPL CALCULATED.3IONS-SCNC: 16 MMOL/L (ref 9–17)
AST SERPL-CCNC: 14 U/L
BASOPHILS # BLD: 1 % (ref 0–2)
BASOPHILS ABSOLUTE: 0.09 K/UL (ref 0–0.2)
BILIRUB SERPL-MCNC: 0.51 MG/DL (ref 0.3–1.2)
BILIRUBIN URINE: NEGATIVE
BUN BLDV-MCNC: 11 MG/DL (ref 8–23)
BUN/CREAT BLD: ABNORMAL (ref 9–20)
CALCIUM SERPL-MCNC: 10 MG/DL (ref 8.6–10.4)
CHLORIDE BLD-SCNC: 99 MMOL/L (ref 98–107)
CO2: 27 MMOL/L (ref 20–31)
COLOR: YELLOW
COMMENT UA: NORMAL
CREAT SERPL-MCNC: 1.01 MG/DL (ref 0.5–0.9)
DIFFERENTIAL TYPE: ABNORMAL
EOSINOPHILS RELATIVE PERCENT: 3 % (ref 1–4)
GFR AFRICAN AMERICAN: >60 ML/MIN
GFR NON-AFRICAN AMERICAN: 55 ML/MIN
GFR SERPL CREATININE-BSD FRML MDRD: ABNORMAL ML/MIN/{1.73_M2}
GFR SERPL CREATININE-BSD FRML MDRD: ABNORMAL ML/MIN/{1.73_M2}
GLUCOSE BLD-MCNC: 71 MG/DL (ref 70–99)
GLUCOSE URINE: NEGATIVE
HCT VFR BLD CALC: 37.4 % (ref 36.3–47.1)
HEMOGLOBIN: 11.9 G/DL (ref 11.9–15.1)
IMMATURE GRANULOCYTES: 0 %
KETONES, URINE: NEGATIVE
LEUKOCYTE ESTERASE, URINE: NEGATIVE
LYMPHOCYTES # BLD: 32 % (ref 24–43)
MCH RBC QN AUTO: 31.6 PG (ref 25.2–33.5)
MCHC RBC AUTO-ENTMCNC: 31.8 G/DL (ref 28.4–34.8)
MCV RBC AUTO: 99.5 FL (ref 82.6–102.9)
MONOCYTES # BLD: 8 % (ref 3–12)
NITRITE, URINE: NEGATIVE
NRBC AUTOMATED: 0 PER 100 WBC
PDW BLD-RTO: 15.3 % (ref 11.8–14.4)
PH UA: 6.5 (ref 5–8)
PLATELET # BLD: 319 K/UL (ref 138–453)
PLATELET ESTIMATE: ABNORMAL
PMV BLD AUTO: 10.6 FL (ref 8.1–13.5)
POTASSIUM SERPL-SCNC: 3.8 MMOL/L (ref 3.7–5.3)
PROTEIN UA: NEGATIVE
PTH INTACT: 73.88 PG/ML (ref 15–65)
RBC # BLD: 3.76 M/UL (ref 3.95–5.11)
RBC # BLD: ABNORMAL 10*6/UL
SEG NEUTROPHILS: 56 % (ref 36–65)
SEGMENTED NEUTROPHILS ABSOLUTE COUNT: 5.55 K/UL (ref 1.5–8.1)
SODIUM BLD-SCNC: 142 MMOL/L (ref 135–144)
SPECIFIC GRAVITY UA: 1 (ref 1–1.03)
TOTAL PROTEIN: 7.9 G/DL (ref 6.4–8.3)
TURBIDITY: CLEAR
URINE HGB: NEGATIVE
UROBILINOGEN, URINE: NORMAL
VITAMIN D 25-HYDROXY: 49 NG/ML (ref 30–100)
WBC # BLD: 9.8 K/UL (ref 3.5–11.3)
WBC # BLD: ABNORMAL 10*3/UL

## 2020-04-01 ENCOUNTER — HOSPITAL ENCOUNTER (OUTPATIENT)
Dept: GENERAL RADIOLOGY | Age: 70
Discharge: HOME | End: 2020-04-03
Payer: MEDICARE

## 2020-04-01 ENCOUNTER — HOSPITAL ENCOUNTER (OUTPATIENT)
Age: 70
Discharge: HOME | End: 2020-04-03
Payer: MEDICARE

## 2020-04-01 PROCEDURE — 73562 X-RAY EXAM OF KNEE 3: CPT

## 2020-08-11 ENCOUNTER — HOSPITAL ENCOUNTER (OUTPATIENT)
Age: 70
Discharge: HOME | End: 2020-08-11
Payer: MEDICARE

## 2020-08-11 LAB
ABSOLUTE EOS #: 0.29 K/UL (ref 0–0.44)
ABSOLUTE IMMATURE GRANULOCYTE: 0.05 K/UL (ref 0–0.3)
ABSOLUTE LYMPH #: 2.53 K/UL (ref 1.1–3.7)
ABSOLUTE MONO #: 0.7 K/UL (ref 0.1–1.2)
ALBUMIN SERPL-MCNC: 4.2 G/DL (ref 3.5–5.2)
ALBUMIN/GLOBULIN RATIO: 1.3 (ref 1–2.5)
ALP BLD-CCNC: 102 U/L (ref 35–104)
ALT SERPL-CCNC: 10 U/L (ref 5–33)
ANION GAP SERPL CALCULATED.3IONS-SCNC: 21 MMOL/L (ref 9–17)
AST SERPL-CCNC: 21 U/L
BASOPHILS # BLD: 1 % (ref 0–2)
BASOPHILS ABSOLUTE: 0.09 K/UL (ref 0–0.2)
BILIRUB SERPL-MCNC: 0.53 MG/DL (ref 0.3–1.2)
BILIRUBIN URINE: NEGATIVE
BUN BLDV-MCNC: 17 MG/DL (ref 8–23)
BUN/CREAT BLD: ABNORMAL (ref 9–20)
CALCIUM SERPL-MCNC: 10.1 MG/DL (ref 8.6–10.4)
CHLORIDE BLD-SCNC: 101 MMOL/L (ref 98–107)
CO2: 23 MMOL/L (ref 20–31)
COLOR: YELLOW
COMMENT UA: NORMAL
CREAT SERPL-MCNC: 1.41 MG/DL (ref 0.5–0.9)
DIFFERENTIAL TYPE: ABNORMAL
EOSINOPHILS RELATIVE PERCENT: 4 % (ref 1–4)
GFR AFRICAN AMERICAN: 45 ML/MIN
GFR NON-AFRICAN AMERICAN: 37 ML/MIN
GFR SERPL CREATININE-BSD FRML MDRD: ABNORMAL ML/MIN/{1.73_M2}
GFR SERPL CREATININE-BSD FRML MDRD: ABNORMAL ML/MIN/{1.73_M2}
GLUCOSE BLD-MCNC: 126 MG/DL (ref 70–99)
GLUCOSE URINE: NEGATIVE
HCT VFR BLD CALC: 39.7 % (ref 36.3–47.1)
HEMOGLOBIN: 12.6 G/DL (ref 11.9–15.1)
IMMATURE GRANULOCYTES: 1 %
KETONES, URINE: NEGATIVE
LEUKOCYTE ESTERASE, URINE: NEGATIVE
LYMPHOCYTES # BLD: 31 % (ref 24–43)
MCH RBC QN AUTO: 32.6 PG (ref 25.2–33.5)
MCHC RBC AUTO-ENTMCNC: 31.7 G/DL (ref 28.4–34.8)
MCV RBC AUTO: 102.6 FL (ref 82.6–102.9)
MONOCYTES # BLD: 8 % (ref 3–12)
NITRITE, URINE: NEGATIVE
NRBC AUTOMATED: 0 PER 100 WBC
PDW BLD-RTO: 14.8 % (ref 11.8–14.4)
PH UA: 5.5 (ref 5–8)
PLATELET # BLD: 262 K/UL (ref 138–453)
PLATELET ESTIMATE: ABNORMAL
PMV BLD AUTO: 11 FL (ref 8.1–13.5)
POTASSIUM SERPL-SCNC: 4.7 MMOL/L (ref 3.7–5.3)
PROTEIN UA: NEGATIVE
PTH INTACT: 51.97 PG/ML (ref 15–65)
RBC # BLD: 3.87 M/UL (ref 3.95–5.11)
RBC # BLD: ABNORMAL 10*6/UL
SEG NEUTROPHILS: 55 % (ref 36–65)
SEGMENTED NEUTROPHILS ABSOLUTE COUNT: 4.64 K/UL (ref 1.5–8.1)
SODIUM BLD-SCNC: 145 MMOL/L (ref 135–144)
SPECIFIC GRAVITY UA: 1.01 (ref 1–1.03)
TOTAL PROTEIN: 7.5 G/DL (ref 6.4–8.3)
TURBIDITY: CLEAR
URINE HGB: NEGATIVE
UROBILINOGEN, URINE: NORMAL
VITAMIN D 25-HYDROXY: 54.2 NG/ML (ref 30–100)
WBC # BLD: 8.3 K/UL (ref 3.5–11.3)
WBC # BLD: ABNORMAL 10*3/UL

## 2020-08-11 PROCEDURE — 85025 COMPLETE CBC W/AUTO DIFF WBC: CPT

## 2020-08-11 PROCEDURE — 82306 VITAMIN D 25 HYDROXY: CPT

## 2020-08-11 PROCEDURE — 36415 COLL VENOUS BLD VENIPUNCTURE: CPT

## 2020-08-11 PROCEDURE — 80053 COMPREHEN METABOLIC PANEL: CPT

## 2020-08-11 PROCEDURE — 83970 ASSAY OF PARATHORMONE: CPT

## 2020-08-11 PROCEDURE — 81003 URINALYSIS AUTO W/O SCOPE: CPT

## 2021-02-25 ENCOUNTER — IMMUNIZATION (OUTPATIENT)
Dept: LAB | Age: 71
End: 2021-02-25
Payer: MEDICARE

## 2021-02-25 PROCEDURE — 0011A COVID-19, MODERNA VACCINE 100MCG/0.5ML DOSE: CPT

## 2021-03-25 ENCOUNTER — IMMUNIZATION (OUTPATIENT)
Dept: LAB | Age: 71
End: 2021-03-25
Payer: MEDICARE

## 2021-03-25 PROCEDURE — 91301 COVID-19, MODERNA VACCINE 100MCG/0.5ML DOSE: CPT

## 2021-10-06 ENCOUNTER — HOSPITAL ENCOUNTER (OUTPATIENT)
Age: 71
Setting detail: SPECIMEN
Discharge: HOME | End: 2021-10-06
Payer: MEDICARE

## 2021-10-06 ENCOUNTER — OFFICE VISIT (OUTPATIENT)
Dept: FAMILY MEDICINE CLINIC | Age: 71
End: 2021-10-06
Payer: MEDICARE

## 2021-10-06 ENCOUNTER — HOSPITAL ENCOUNTER (OUTPATIENT)
Age: 71
Discharge: HOME | End: 2021-10-06
Payer: MEDICARE

## 2021-10-06 VITALS
SYSTOLIC BLOOD PRESSURE: 136 MMHG | HEIGHT: 63 IN | OXYGEN SATURATION: 98 % | BODY MASS INDEX: 32.07 KG/M2 | HEART RATE: 95 BPM | WEIGHT: 181 LBS | RESPIRATION RATE: 18 BRPM | DIASTOLIC BLOOD PRESSURE: 67 MMHG | TEMPERATURE: 97.7 F

## 2021-10-06 DIAGNOSIS — Z11.52 ENCOUNTER FOR SCREENING FOR COVID-19: ICD-10-CM

## 2021-10-06 DIAGNOSIS — Z87.09 HISTORY OF COPD: ICD-10-CM

## 2021-10-06 DIAGNOSIS — J02.9 SORE THROAT: ICD-10-CM

## 2021-10-06 DIAGNOSIS — Z20.822 EXPOSURE TO COVID-19 VIRUS: Primary | ICD-10-CM

## 2021-10-06 PROBLEM — N28.9 KIDNEY DISORDER: Status: ACTIVE | Noted: 2021-10-06

## 2021-10-06 PROBLEM — J98.4 DISORDER OF LUNG: Status: ACTIVE | Noted: 2021-10-06

## 2021-10-06 PROBLEM — I77.9 DISORDER OF CAROTID ARTERY (HCC): Status: ACTIVE | Noted: 2021-10-06

## 2021-10-06 PROBLEM — I99.9 VASCULAR DISORDER: Status: ACTIVE | Noted: 2021-10-06

## 2021-10-06 LAB
ABSOLUTE EOS #: 0.1 K/UL (ref 0–0.44)
ABSOLUTE IMMATURE GRANULOCYTE: 0.04 K/UL (ref 0–0.3)
ABSOLUTE LYMPH #: 2.08 K/UL (ref 1.1–3.7)
ABSOLUTE MONO #: 0.76 K/UL (ref 0.1–1.2)
ANION GAP SERPL CALCULATED.3IONS-SCNC: 15 MMOL/L (ref 9–17)
BASOPHILS # BLD: 1 % (ref 0–2)
BASOPHILS ABSOLUTE: 0.06 K/UL (ref 0–0.2)
BUN BLDV-MCNC: 14 MG/DL (ref 8–23)
BUN/CREAT BLD: ABNORMAL (ref 9–20)
CALCIUM SERPL-MCNC: 9.1 MG/DL (ref 8.6–10.4)
CHLORIDE BLD-SCNC: 101 MMOL/L (ref 98–107)
CO2: 28 MMOL/L (ref 20–31)
COLLAGEN ADENOSINE-5'-DIPHOSPHATE (ADP) TIME: 71 SEC (ref 67–112)
COLLAGEN EPINEPHRINE TIME: 127 SEC (ref 85–172)
CREAT SERPL-MCNC: 1.3 MG/DL (ref 0.5–0.9)
DIFFERENTIAL TYPE: ABNORMAL
EOSINOPHILS ABSOLUTE: 97 /UL (ref 200–400)
EOSINOPHILS RELATIVE PERCENT: 1 % (ref 1–4)
EOSINOPHILS RELATIVE PERCENT: ABNORMAL %
GFR AFRICAN AMERICAN: 49 ML/MIN
GFR NON-AFRICAN AMERICAN: 41 ML/MIN
GFR SERPL CREATININE-BSD FRML MDRD: ABNORMAL ML/MIN/{1.73_M2}
GFR SERPL CREATININE-BSD FRML MDRD: ABNORMAL ML/MIN/{1.73_M2}
GLUCOSE BLD-MCNC: 115 MG/DL (ref 70–99)
HCT VFR BLD CALC: 35.8 % (ref 36.3–47.1)
HEMOGLOBIN: 11.1 G/DL (ref 11.9–15.1)
IGE: 306 IU/ML
IMMATURE GRANULOCYTES: 0 %
INR BLD: 1.1
LYMPHOCYTES # BLD: 19 % (ref 24–43)
MCH RBC QN AUTO: 31.1 PG (ref 25.2–33.5)
MCHC RBC AUTO-ENTMCNC: 31 G/DL (ref 28.4–34.8)
MCV RBC AUTO: 100.3 FL (ref 82.6–102.9)
MONOCYTES # BLD: 7 % (ref 3–12)
NRBC AUTOMATED: 0 PER 100 WBC
PARTIAL THROMBOPLASTIN TIME: 25 SEC (ref 21.3–31.3)
PDW BLD-RTO: 16.5 % (ref 11.8–14.4)
PLATELET # BLD: 322 K/UL (ref 138–453)
PLATELET ESTIMATE: ABNORMAL
PLATELET FUNCTION INTERP: NORMAL
PMV BLD AUTO: 10.8 FL (ref 8.1–13.5)
POTASSIUM SERPL-SCNC: 3.3 MMOL/L (ref 3.7–5.3)
PROTHROMBIN TIME: 11 SEC (ref 9.4–12.6)
RBC # BLD: 3.57 M/UL (ref 3.95–5.11)
RBC # BLD: ABNORMAL 10*6/UL
RHEUMATOID FACTOR: <10 IU/ML
SEG NEUTROPHILS: 72 % (ref 36–65)
SEGMENTED NEUTROPHILS ABSOLUTE COUNT: 7.74 K/UL (ref 1.5–8.1)
SODIUM BLD-SCNC: 144 MMOL/L (ref 135–144)
WBC # BLD: 10.8 K/UL
WBC # BLD: 10.8 K/UL (ref 3.5–11.3)
WBC # BLD: ABNORMAL 10*3/UL

## 2021-10-06 PROCEDURE — 85048 AUTOMATED LEUKOCYTE COUNT: CPT

## 2021-10-06 PROCEDURE — 99202 OFFICE O/P NEW SF 15 MIN: CPT | Performed by: NURSE PRACTITIONER

## 2021-10-06 PROCEDURE — 80048 BASIC METABOLIC PNL TOTAL CA: CPT

## 2021-10-06 PROCEDURE — 85576 BLOOD PLATELET AGGREGATION: CPT

## 2021-10-06 PROCEDURE — 85025 COMPLETE CBC W/AUTO DIFF WBC: CPT

## 2021-10-06 PROCEDURE — 85610 PROTHROMBIN TIME: CPT

## 2021-10-06 PROCEDURE — 1090F PRES/ABSN URINE INCON ASSESS: CPT | Performed by: NURSE PRACTITIONER

## 2021-10-06 PROCEDURE — 86200 CCP ANTIBODY: CPT

## 2021-10-06 PROCEDURE — 1036F TOBACCO NON-USER: CPT | Performed by: NURSE PRACTITIONER

## 2021-10-06 PROCEDURE — 36415 COLL VENOUS BLD VENIPUNCTURE: CPT

## 2021-10-06 PROCEDURE — 85730 THROMBOPLASTIN TIME PARTIAL: CPT

## 2021-10-06 PROCEDURE — G8400 PT W/DXA NO RESULTS DOC: HCPCS | Performed by: NURSE PRACTITIONER

## 2021-10-06 PROCEDURE — G8484 FLU IMMUNIZE NO ADMIN: HCPCS | Performed by: NURSE PRACTITIONER

## 2021-10-06 PROCEDURE — 82785 ASSAY OF IGE: CPT

## 2021-10-06 PROCEDURE — G8427 DOCREV CUR MEDS BY ELIG CLIN: HCPCS | Performed by: NURSE PRACTITIONER

## 2021-10-06 PROCEDURE — 86431 RHEUMATOID FACTOR QUANT: CPT

## 2021-10-06 PROCEDURE — 1123F ACP DISCUSS/DSCN MKR DOCD: CPT | Performed by: NURSE PRACTITIONER

## 2021-10-06 PROCEDURE — G8417 CALC BMI ABV UP PARAM F/U: HCPCS | Performed by: NURSE PRACTITIONER

## 2021-10-06 PROCEDURE — 3017F COLORECTAL CA SCREEN DOC REV: CPT | Performed by: NURSE PRACTITIONER

## 2021-10-06 PROCEDURE — 4040F PNEUMOC VAC/ADMIN/RCVD: CPT | Performed by: NURSE PRACTITIONER

## 2021-10-06 RX ORDER — INFLUENZA A VIRUS A/MICHIGAN/45/2015 X-275 (H1N1) ANTIGEN (FORMALDEHYDE INACTIVATED), INFLUENZA A VIRUS A/SINGAPORE/INFIMH-16-0019/2016 IVR-186 (H3N2) ANTIGEN (FORMALDEHYDE INACTIVATED), INFLUENZA B VIRUS B/PHUKET/3073/2013 ANTIGEN (FORMALDEHYDE INACTIVATED), AND INFLUENZA B VIRUS B/MARYLAND/15/2016 BX-69A ANTIGEN (FORMALDEHYDE INACTIVATED) 60; 60; 60; 60 UG/.7ML; UG/.7ML; UG/.7ML; UG/.7ML
INJECTION, SUSPENSION INTRAMUSCULAR
Status: ON HOLD | COMMUNITY
End: 2022-07-09 | Stop reason: HOSPADM

## 2021-10-06 RX ORDER — BUDESONIDE, GLYCOPYRROLATE, AND FORMOTEROL FUMARATE 160; 9; 4.8 UG/1; UG/1; UG/1
AEROSOL, METERED RESPIRATORY (INHALATION)
Status: ON HOLD | COMMUNITY
Start: 2021-09-16 | End: 2022-07-09 | Stop reason: HOSPADM

## 2021-10-06 RX ORDER — BUDESONIDE AND FORMOTEROL FUMARATE DIHYDRATE 160; 4.5 UG/1; UG/1
AEROSOL RESPIRATORY (INHALATION)
COMMUNITY
Start: 2021-09-23

## 2021-10-06 RX ORDER — ROSUVASTATIN CALCIUM 10 MG/1
TABLET, COATED ORAL
Status: ON HOLD | COMMUNITY
End: 2022-07-09 | Stop reason: HOSPADM

## 2021-10-06 RX ORDER — MELOXICAM 7.5 MG/1
TABLET ORAL
Status: ON HOLD | COMMUNITY
End: 2022-07-09 | Stop reason: HOSPADM

## 2021-10-06 RX ORDER — HYDROCODONE BITARTRATE AND ACETAMINOPHEN 5; 325 MG/1; MG/1
TABLET ORAL
COMMUNITY
Start: 2021-10-04 | End: 2022-04-24

## 2021-10-06 RX ORDER — FUROSEMIDE 40 MG/1
20 TABLET ORAL DAILY
COMMUNITY
Start: 2021-09-21

## 2021-10-06 RX ORDER — TRIAMCINOLONE ACETONIDE 10 MG/ML
INJECTION, SUSPENSION INTRA-ARTICULAR; INTRALESIONAL
Status: ON HOLD | COMMUNITY
End: 2022-07-09 | Stop reason: HOSPADM

## 2021-10-06 RX ORDER — LIDOCAINE HYDROCHLORIDE 10 MG/ML
INJECTION, SOLUTION INFILTRATION; PERINEURAL
Status: ON HOLD | COMMUNITY
End: 2022-07-09 | Stop reason: HOSPADM

## 2021-10-06 SDOH — ECONOMIC STABILITY: FOOD INSECURITY: WITHIN THE PAST 12 MONTHS, THE FOOD YOU BOUGHT JUST DIDN'T LAST AND YOU DIDN'T HAVE MONEY TO GET MORE.: PATIENT DECLINED

## 2021-10-06 SDOH — ECONOMIC STABILITY: FOOD INSECURITY: WITHIN THE PAST 12 MONTHS, YOU WORRIED THAT YOUR FOOD WOULD RUN OUT BEFORE YOU GOT MONEY TO BUY MORE.: PATIENT DECLINED

## 2021-10-06 ASSESSMENT — SOCIAL DETERMINANTS OF HEALTH (SDOH): HOW HARD IS IT FOR YOU TO PAY FOR THE VERY BASICS LIKE FOOD, HOUSING, MEDICAL CARE, AND HEATING?: PATIENT DECLINED

## 2021-10-06 ASSESSMENT — ENCOUNTER SYMPTOMS
SHORTNESS OF BREATH: 0
EYE PAIN: 0
CHEST TIGHTNESS: 0
NAUSEA: 0
VOICE CHANGE: 0
SORE THROAT: 1
COUGH: 1
RHINORRHEA: 0
TROUBLE SWALLOWING: 0
VOMITING: 0

## 2021-10-06 ASSESSMENT — PATIENT HEALTH QUESTIONNAIRE - PHQ9
SUM OF ALL RESPONSES TO PHQ QUESTIONS 1-9: 0
SUM OF ALL RESPONSES TO PHQ QUESTIONS 1-9: 0
1. LITTLE INTEREST OR PLEASURE IN DOING THINGS: 0
SUM OF ALL RESPONSES TO PHQ9 QUESTIONS 1 & 2: 0
SUM OF ALL RESPONSES TO PHQ QUESTIONS 1-9: 0
DEPRESSION UNABLE TO ASSESS: PT REFUSES
2. FEELING DOWN, DEPRESSED OR HOPELESS: 0

## 2021-10-06 NOTE — PATIENT INSTRUCTIONS
Learning About Coronavirus [phone])  Coronavirus [phone]): Overview  What is coronavirus (COVID-19)? The coronavirus disease (COVID-19) is caused by a virus. It is an illness that was first found in [place], [place], in December 2019. It has since spread worldwide. The virus can cause fever, cough, and trouble breathing. In severe cases, it can cause pneumonia and make it hard to breathe without help. It can cause death. Coronaviruses are a large group of viruses. They cause the common cold. They also cause more serious illnesses like Middle East respiratory syndrome (MERS) and severe acute respiratory syndrome (SARS). COVID-19 is caused by a novel coronavirus. That means it's a new type that has not been seen in people before. This virus spreads person-to-person through droplets from coughing and sneezing. It can also spread when you are close to someone who is infected. And it can spread when you touch something that has the virus on it, such as a doorknob or a tabletop. What can you do to protect yourself from coronavirus (COVID-19)? The best way to protect yourself from getting sick is to:  · Avoid areas where there is an outbreak. · Avoid contact with people who may be infected. · Wash your hands often with soap or alcohol-based hand sanitizers. · Avoid crowds and try to stay at least 6 feet away from other people. · Wash your hands often, especially after you cough or sneeze. Use soap and water, and scrub for at least 20 seconds. If soap and water aren't available, use an alcohol-based hand . · Avoid touching your mouth, nose, and eyes. What can you do to avoid spreading the virus to others? To help avoid spreading the virus to others:  · Cover your mouth with a tissue when you cough or sneeze. Then throw the tissue in the trash. · Use a disinfectant to clean things that you touch often. · Wear a cloth face cover if you have to go to public areas.   · Stay home if you are sick or have been exposed to the virus. Don't go to school, work, or public areas. And don't use public transportation. · If you are sick:  ? Leave your home only if you need to get medical care. But call the doctor's office first so they know you're coming. And wear a face cover. ? Wear the face cover whenever you're around other people. It can help stop the spread of the virus when you cough or sneeze. ? Clean and disinfect your home every day. Use household  and disinfectant wipes or sprays. Take special care to clean things that you grab with your hands. These include doorknobs, remote controls, phones, and handles on your refrigerator and microwave. And don't forget countertops, tabletops, bathrooms, and computer keyboards. When to call for help  [id number] anytime you think you may need emergency care. For example, call if:  · You have severe trouble breathing. (You can't talk at all.)  · You have constant chest pain or pressure. · You are severely dizzy or lightheaded. · You are confused or can't think clearly. · Your face and lips have a blue color. · You pass out (lose consciousness) or are very hard to wake up. Call your doctor now if you develop symptoms such as:  · Shortness of breath. · Fever. · Cough. If you need to get care, call ahead to the doctor's office for instructions before you go. Make sure you wear a face cover to prevent exposing other people to the virus. Where can you get the latest information? The following health organizations are tracking and studying this virus. Their websites contain the most up-to-date information. [place] also learn what to do if you think you may have been exposed to the virus. · U.S. Centers for Disease Control and Prevention (CDC): The CDC provides updated news about the disease and travel advice. The website also tells you how to prevent the spread of infection.  www.cdc.gov  · World Health Organization [facility]): WHO offers information about the virus outbreaks. WHO also has travel advice. www.who.int  Current as of: April 24, 2020               Content Version: 12.4  © 2006-2020 Health[employer]orporated. Care instructions adapted under license by your healthcare professional. If you have questions about a medical condition or this instruction, always ask your healthcare professional. [address] any warranty or liability for your use of this information. Coronavirus ([id number]-47): Care Instructions  Overview  The coronavirus disease (COVID-19) is caused by a virus. It causes a fever, a cough, and shortness of breath. It mainly spreads person-to-person through droplets from coughing and sneezing. The virus also can spread when people are in close contact with someone who is infected. Most people have mild symptoms and can take care of themselves at home. If their symptoms get worse, they may need care in a hospital. There is no medicine to fight the virus. It's important to not spread the virus to others. If you have COVID-19, wear a face cover anytime you are around other people. You need to isolate yourself while you are sick. Your doctor will tell you when you no longer need to be isolated. Leave your home only if you need to get medical care. Follow-up care is a key part of your treatment and safety. Be sure to make and go to all appointments, and call your doctor if you are having problems. It's also a good idea to know your test results and keep a list of the medicines you take. How can you care for yourself at home? · Get extra rest. It can help you feel better. · Drink plenty of fluids. This helps replace fluids lost from fever. Fluids also help ease a scratchy throat. Water, soup, fruit juice, and hot tea with lemon are good choices. · Take acetaminophen (such as Tylenol) to reduce a fever. It may also help with muscle aches. Read and follow all instructions on the label.   · Sponge your body with lukewarm water to help with fever. Don't use cold water or ice. · Use petroleum jelly on sore skin. This can help if the skin around your nose and lips becomes sore from rubbing a lot with tissues. Tips for isolation  · Wear a cloth face cover when you are around other people. It can help stop the spread of the virus when you cough or sneeze. · Limit contact with people in your home. If possible, stay in a separate bedroom and use a separate bathroom. · Avoid contact with pets and other animals. · Cover your mouth and nose with a tissue when you cough or sneeze. Then throw it in the trash right away. · Wash your hands often, especially after you cough or sneeze. Use soap and water, and scrub for at least 20 seconds. If soap and water aren't available, use an alcohol-based hand . · Don't share personal household items. These include bedding, towels, cups and glasses, and eating utensils. · Clean and disinfect your home every day. Use household  and disinfectant wipes or sprays. Take special care to clean things that you grab with your hands. These include doorknobs, remote controls, phones, and handles on your refrigerator and microwave. And don't forget countertops, tabletops, bathrooms, and computer keyboards. When should you call for help? [id number] anytime you think you may need emergency care. For example, call if you have life-threatening symptoms, such as:  · You have severe trouble breathing. (You can't talk at all.)  · You have constant chest pain or pressure. · You are severely dizzy or lightheaded. · You are confused or can't think clearly. · Your face and lips have a blue color. · You pass out (lose consciousness) or are very hard to wake up. Call your doctor now or seek immediate medical care if:  · You have moderate trouble breathing. (You can't speak a full sentence.)  · You are coughing up blood (more than about 1 teaspoon). · You have signs of low blood pressure.  These include feeling lightheaded; being too weak to stand; and having cold, pale, clammy skin. Watch closely for changes in your health, and be sure to contact your doctor if:  · Your symptoms get worse. · You are not getting better as expected. Call before you go to the doctor's office. Follow their instructions. And wear a cloth face cover. Current as of: April 24, 2020               Content Version: 12.4  © 2006-2020 Healthwise, Incorporated. Care instructions adapted under license by your healthcare professional. If you have questions about a medical condition or this instruction, always ask your healthcare professional. [address] any warranty or liability for your use of this information.

## 2021-10-06 NOTE — PROGRESS NOTES
[address] WALK-IN  [facility] [place], [place] [id number]  Dept: [phone]  Dept Fax: [phone]    Nasra Leon is a 78 y.o. female who presents today for her medical conditions/complaints of   Chief Complaint   Patient presents with    Pharyngitis     x 2days ago          HPI:     /67   Pulse 95   Temp 97.7 °F (36.5 °C)   Resp 18   Ht 5' 3\" (1.6 m)   Wt 181 lb (82.1 kg)   SpO2 98%   BMI 32.06 kg/m²       HPI  Pt presented to the clinic today with c/o sore throat.(resolved today) This is a new problem. The current episode started 2 days ago. The problem has resolved since onset. Associated symptoms include: none. Pertinent negatives include: No fever, chills, SOB, chest pain, abdominal pain, loss of taste, smell . Pt has tried nothing with improvement. Vaccinated for COVID - YES  History of COVID- NO  Exposed to [address] by grandson   Has procedure on 10/18 bronch with bx  History of COPD.     Past Medical History:   Diagnosis Date    A-fib ([facility] [place] [address]     Anxiety     Arthritis     Blood transfusion reaction     2001 with surgery    CAD (coronary artery disease)     Carotid stenosis     COPD (chronic obstructive pulmonary disease) (HCC)     GERD (gastroesophageal reflux disease)     Hepatitis B 1972    History of blood transfusion 2001    Hyperlipidemia     Hypertension     Hypothyroid     Kidney disease     GFR 60, with mass lower left chamber and right upper   Stage 3    Legally blind     patient gets eye appointment    Macular edema 2013    eye injections    PVD (peripheral vascular disease) ([facility] [address]     Sleep apnea     CPAP    Subclavian artery stenosis (HCC)         Past Surgical History:   Procedure Laterality Date    AORTO-FEMORAL BYPASS GRAFT  2001    CARDIAC CATHETERIZATION  10/2012    no stents    CAROTID ENDARTERECTOMY      CAROTID ENDARTERECTOMY Left 05/16/2019    CAROTID ENDARTERECTOMY LEFT (Left Neck)    CAROTID ENDARTERECTOMY Left 2019    CAROTID ENDARTERECTOMY LEFT performed by Khris Polo [facility] at [facility] Bilateral     CHOLECYSTECTOMY  2014    laparscopic    EYE SURGERY      LASIK LEFT EYE    EYE SURGERY      catarat extraction    TOE AMPUTATION  2001    gangrene     TONSILLECTOMY      TUMOR REMOVAL Left     carotid       Family History   Problem Relation Age of Onset    Heart Attack Father     Stroke Father     High Blood Pressure Father     Heart Disease Father     Cancer Mother         Multiple myeloma    Cancer Sister         Lung with mets to brain    Cancer Maternal Aunt         Several aunts had unknown cancer    Heart Attack Brother     Liver Disease Brother     Heart Defect Sister        Social History     Tobacco Use    Smoking status: Former Smoker     Quit date: 2014     Years since quittin.4    Smokeless tobacco: Former User    Tobacco comment: USING E-CIG   Substance Use Topics    Alcohol use: No     Alcohol/week: 0.0 standard drinks        Prior to Visit Medications    Medication Sig Taking?  Authorizing Provider   SYMBICORT 160-4.5 MCG/ACT AERO  Yes Historical Provider, MD Isaacs Lenora 160-9-4.8 MCG/ACT AERO  Yes Historical Provider, MD   calcium carbonate 1500 (600 Ca) MG TABS tablet  Yes Historical Provider, MD   furosemide (LASIX) 40 MG tablet  Yes Historical Provider, MD   HYDROcodone-acetaminophen ([facility]) 5-325 MG per tablet  Yes Historical Provider, MD   rosuvastatin (CRESTOR) 10 MG tablet rosuvastatin 10 mg tablet Yes Historical Provider, MD   lidocaine 1 % injection lidocaine HCl 10 mg/mL (1 %) injection solution   INJ Yes Historical Provider, MD   meloxicam (MOBIC) 7.5 MG tablet meloxicam 7.5 mg tablet Yes Historical Provider, MD   Influenza Vac High-Dose Quad (FLUZONE HIGH-DOSE QUADRIVALENT) 0.7 ML FILEMON injection Fluzone High-Dose Quad  (PF) 240 mcg/0.7 mL IM syringe   ADM 0.7ML IM UTD Yes Historical Provider, MD triamcinolone acetonide (KENALOG) 10 MG/ML injection Kenalog 10 mg/mL suspension for injection   INJ Yes Historical Provider, MD   pregabalin (LYRICA) 100 MG capsule Take 100 mg by mouth 2 times daily. Yes Historical Provider, MD   alendronate (FOSAMAX) 35 MG tablet Take 35 mg by mouth every 7 days Yes Historical Provider, MD   aspirin 81 MG EC tablet Take 81 mg by mouth daily Yes Historical Provider, MD   varenicline (CHANTIX) 1 MG tablet Take 1 mg by mouth 2 times daily Yes Historical Provider, MD   fexofenadine (ALLEGRA) 180 MG tablet Take 180 mg by mouth daily Yes Historical Provider, MD   rivaroxaban (XARELTO) 20 MG TABS tablet Take 20 mg by mouth nightly  Yes Historical Provider, MD   fluticasone-salmeterol (ADVAIR) 250-50 MCG/DOSE AEPB Inhale 1 puff into the lungs every 12 hours. Yes Historical Provider, MD   losartan (COZAAR) 100 MG tablet Take 100 mg by mouth daily. Yes Historical Provider, MD   fluticasone (FLONASE) 50 MCG/ACT nasal spray 2 sprays by Nasal route daily. Yes Historical Provider, MD   levothyroxine (SYNTHROID) 50 MCG tablet Take 50 mcg by mouth Daily. Yes Historical Provider, MD   omeprazole (PRILOSEC) 40 MG delayed release capsule Take 40 mg by mouth daily. Yes Historical Provider, MD   albuterol (PROVENTIL HFA;VENTOLIN HFA) 108 (90 BASE) MCG/ACT inhaler Inhale 2 puffs into the lungs every 4 hours as needed for Wheezing. Yes Historical Provider, MD   ALPRAZolam Alverta Kerry) 1 MG tablet Take 1 mg by mouth 3 times daily as needed for Sleep or Anxiety. . Yes Historical Provider, MD   traMADol (ULTRAM) 50 MG tablet Take 50 mg by mouth 4 times daily as needed for Pain. Yes Historical Provider, MD   cyanocobalamin 1000 MCG/ML injection Inject 1,000 mcg into the muscle once. Patient takes monthly Yes Historical Provider, MD   simvastatin (ZOCOR) 20 MG tablet Take 20 mg by mouth nightly. Yes Historical Provider, MD   folic acid (FOLVITE) 1 MG tablet Take 1 mg by mouth daily.  Yes Historical Provider, MD sounds are normal.      Palpations: Abdomen is soft. Musculoskeletal:         General: Normal range of motion. Cervical back: Normal range of motion and neck supple. Right lower leg: No edema. Left lower leg: No edema. Skin:     General: Skin is warm and dry. Capillary Refill: Capillary refill takes less than 2 seconds. Findings: No rash. Neurological:      Mental Status: She is alert and oriented to person, place, and time. Coordination: Coordination normal.      Gait: Gait normal.   Psychiatric:         Mood and Affect: Mood normal.         Thought Content: Thought content normal.           MEDICAL DECISION MAKING Assessment/Plan:     Rocio Finn was seen today for pharyngitis. Diagnoses and all orders for this visit:    Exposure to COVID-19 virus  -     COVID-19; Future    Sore throat  -     COVID-19; Future    Encounter for screening for COVID-19  -     COVID-19; Future    History of COPD        Results for orders placed or performed during the hospital encounter of 10/06/21   Platelet function test   Result Value Ref Range    JIMMIE/EPI Clos Time 127 85 - 172 sec    Collagen Adenosine-5'-Diphosphate (Adp) Time 71 67 - 112 sec    Platelet Function Interp       Normal platelet function. If patient clinical history/Physical examination is positive for a bleeding diathesis, recommend repeat testing and/or additional primary hemostasis and/or coagulation studies. Protime-INR   Result Value Ref Range    Protime 11.0 9.4 - 12.6 sec    INR 1.1    APTT   Result Value Ref Range    PTT 25.0 21.3 - 31.3 sec     Based on the history and exam,   Will send out COVID19 testing. Possible treatment alterations based on the results. History of COPD- no issues at this time. Patient instructed to self-quarantine until testing results are back- and to follow the quarantine instructions in the after visit summary.       The patient indicates understanding of these issues and agrees with the plan.  Educational materials provided on AVS.  Follow up if symptoms do not improve/worsen. Call with any questions or concerns. Discussed symptoms that will warrant urgent ED evaluation/treatment. Preventing the Spread of Coronavirus Disease 2019 in Homes and Residential Communities: For the most recent information go to: [employer]    Patient given educational materials - see patientinstructions. Discussed use, benefit, and side effects of prescribed medications. All patient questions answered. Pt verbalized understanding. Instructed to continue current medications, diet and exercise. Patient agreed with treatment plan. Follow up as directed.      Electronically signed by RAYMUNDO Barker CNP on 10/6/2021 at 5:22 PM

## 2021-10-07 DIAGNOSIS — J02.9 SORE THROAT: ICD-10-CM

## 2021-10-07 DIAGNOSIS — Z11.52 ENCOUNTER FOR SCREENING FOR COVID-19: ICD-10-CM

## 2021-10-07 DIAGNOSIS — Z20.822 EXPOSURE TO COVID-19 VIRUS: ICD-10-CM

## 2021-10-07 LAB
SARS-COV-2: NORMAL
SARS-COV-2: NOT DETECTED
SOURCE: NORMAL

## 2021-10-08 LAB — CCP IGG ANTIBODIES: 2.9 U/ML (ref 0–7)

## 2022-04-24 ENCOUNTER — HOSPITAL ENCOUNTER (EMERGENCY)
Age: 72
Discharge: HOME | End: 2022-04-24
Attending: EMERGENCY MEDICINE
Payer: MEDICARE

## 2022-04-24 ENCOUNTER — APPOINTMENT (OUTPATIENT)
Dept: GENERAL RADIOLOGY | Age: 72
End: 2022-04-24
Payer: MEDICARE

## 2022-04-24 VITALS
OXYGEN SATURATION: 96 % | BODY MASS INDEX: 31.18 KG/M2 | HEART RATE: 86 BPM | WEIGHT: 176 LBS | DIASTOLIC BLOOD PRESSURE: 46 MMHG | HEIGHT: 63 IN | RESPIRATION RATE: 16 BRPM | TEMPERATURE: 98.2 F | SYSTOLIC BLOOD PRESSURE: 111 MMHG

## 2022-04-24 DIAGNOSIS — S42.201A CLOSED FRACTURE OF PROXIMAL END OF RIGHT HUMERUS, UNSPECIFIED FRACTURE MORPHOLOGY, INITIAL ENCOUNTER: Primary | ICD-10-CM

## 2022-04-24 PROCEDURE — 6360000002 HC RX W HCPCS: Performed by: EMERGENCY MEDICINE

## 2022-04-24 PROCEDURE — 70450 CT HEAD/BRAIN W/O DYE: CPT

## 2022-04-24 PROCEDURE — 99284 EMERGENCY DEPT VISIT MOD MDM: CPT

## 2022-04-24 PROCEDURE — 73030 X-RAY EXAM OF SHOULDER: CPT

## 2022-04-24 PROCEDURE — 96372 THER/PROPH/DIAG INJ SC/IM: CPT

## 2022-04-24 PROCEDURE — 73060 X-RAY EXAM OF HUMERUS: CPT

## 2022-04-24 RX ORDER — HYDROCODONE BITARTRATE AND ACETAMINOPHEN 5; 325 MG/1; MG/1
1 TABLET ORAL EVERY 6 HOURS PRN
Qty: 12 TABLET | Refills: 0 | Status: SHIPPED | OUTPATIENT
Start: 2022-04-24 | End: 2022-04-27

## 2022-04-24 RX ORDER — MORPHINE SULFATE 10 MG/ML
10 INJECTION, SOLUTION INTRAMUSCULAR; INTRAVENOUS ONCE
Status: DISCONTINUED | OUTPATIENT
Start: 2022-04-24 | End: 2022-04-24

## 2022-04-24 RX ORDER — MORPHINE SULFATE 4 MG/ML
10 INJECTION, SOLUTION INTRAMUSCULAR; INTRAVENOUS ONCE
Status: COMPLETED | OUTPATIENT
Start: 2022-04-24 | End: 2022-04-24

## 2022-04-24 RX ADMIN — MORPHINE SULFATE 10 MG: 4 INJECTION INTRAVENOUS at 15:50

## 2022-04-24 ASSESSMENT — PAIN DESCRIPTION - LOCATION
LOCATION: SHOULDER
LOCATION: SHOULDER

## 2022-04-24 ASSESSMENT — PAIN DESCRIPTION - DESCRIPTORS: DESCRIPTORS: SHARP

## 2022-04-24 ASSESSMENT — PAIN DESCRIPTION - ORIENTATION
ORIENTATION: RIGHT
ORIENTATION: RIGHT

## 2022-04-24 ASSESSMENT — PAIN SCALES - GENERAL
PAINLEVEL_OUTOF10: 10
PAINLEVEL_OUTOF10: 10
PAINLEVEL_OUTOF10: 0

## 2022-04-24 ASSESSMENT — PAIN DESCRIPTION - PAIN TYPE: TYPE: ACUTE PAIN

## 2022-04-24 ASSESSMENT — PAIN - FUNCTIONAL ASSESSMENT: PAIN_FUNCTIONAL_ASSESSMENT: 0-10

## 2022-04-24 ASSESSMENT — PAIN DESCRIPTION - FREQUENCY: FREQUENCY: CONTINUOUS

## 2022-04-24 NOTE — ED PROVIDER NOTES
[address]      Pt Name: Shaquille Martínez  MRN: [id number]  [place] 1950  Date of evaluation: 4/24/2022      CHIEF COMPLAINT       Chief Complaint   Patient presents with    Shoulder Injury     Right         HISTORY OF PRESENT ILLNESS      The patient presents with an injury to her right shoulder. She fell as she was going into the bathroom. She reached out to grab the door of the bathroom and it moved causing her to fall. She landed on her right shoulder. The patient says she cannot move her arm due to pain. She does not think she hit her head but she cannot remember. She does take Xarelto. The patient denies numbness or tingling in the hand. She denies neck or back pain other than her chronic low back pain. She is supposed to see a back surgeon tomorrow in consultation. She denies weakness or numbness in her extremities. Pain is worse with movement of the shoulder and better with rest.      REVIEW OF SYSTEMS       All systems reviewed and negative unless noted in HPI. The patient denies fever or constitutional symptoms. Denies vision change. Denies any sore throat or rhinorrhea. Denies any neck pain or stiffness. Denies chest pain or shortness of breath. No nausea,  vomiting or diarrhea. Chronic back pain. Right shoulder injury as noted in HPI. Denies any weakness, numbness or focal neurologic deficit. Denies any skin rash or edema. No recent psychiatric issues. No easy bruising or bleeding. Denies any polyuria, polydypsia or history of immunocompromise.       PAST MEDICAL HISTORY    has a past medical history of A-fib ([facility] [address] Anxiety, Arthritis, Blood transfusion reaction, CAD (coronary artery disease), Carotid stenosis, COPD (chronic obstructive pulmonary disease) ([facility] [address] GERD (gastroesophageal reflux disease), Hepatitis B, History of blood transfusion, Hyperlipidemia, Hypertension, Hypothyroid, Kidney disease, Legally blind, Macular edema, PVD (peripheral vascular disease) ([facility] [address] Sleep apnea, and Subclavian artery stenosis ([facility] [address] SURGICAL HISTORY      has a past surgical history that includes Aorto-femoral Bypass Graft (2001); tumor removal (Left); Tonsillectomy; Toe amputation (2001); Cataract removal (Bilateral); Cholecystectomy (05/05/2014); Carotid endarterectomy; Cardiac catheterization (10/2012); eye surgery; eye surgery; Carotid endarterectomy (Left, 05/16/2019); and Carotid endarterectomy (Left, 5/16/2019). CURRENT MEDICATIONS       Previous Medications    ALBUTEROL (PROVENTIL HFA;VENTOLIN HFA) 108 (90 BASE) MCG/ACT INHALER    Inhale 2 puffs into the lungs every 4 hours as needed for Wheezing. ALENDRONATE (FOSAMAX) 35 MG TABLET    Take 35 mg by mouth every 7 days    ALPRAZOLAM (XANAX) 1 MG TABLET    Take 1 mg by mouth 3 times daily as needed for Sleep or Anxiety. .    ASPIRIN 81 MG EC TABLET    Take 81 mg by mouth daily    BREZTRI AEROSPHERE 160-9-4.8 MCG/ACT AERO        CALCIUM CARBONATE 1500 (600 CA) MG TABS TABLET        CHOLECALCIFEROL (VITAMIN D) 2000 UNITS TABS    Take  by mouth. CYANOCOBALAMIN 1000 MCG/ML INJECTION    Inject 1,000 mcg into the muscle once. Patient takes monthly    FEXOFENADINE (ALLEGRA) 180 MG TABLET    Take 180 mg by mouth daily    FLUTICASONE (FLONASE) 50 MCG/ACT NASAL SPRAY    2 sprays by Nasal route daily. FLUTICASONE-SALMETEROL (ADVAIR) 250-50 MCG/DOSE AEPB    Inhale 1 puff into the lungs every 12 hours. FOLIC ACID (FOLVITE) 1 MG TABLET    Take 1 mg by mouth daily. FUROSEMIDE (LASIX) 40 MG TABLET        INFLUENZA VAC HIGH-DOSE QUAD (FLUZONE HIGH-DOSE QUADRIVALENT) 0.7 ML FILEMON INJECTION    Fluzone High-Dose Quad 2020-21 (PF) 240 mcg/0.7 mL IM syringe   ADM 0.7ML IM UTD    LEVOTHYROXINE (SYNTHROID) 50 MCG TABLET    Take 50 mcg by mouth Daily.     LIDOCAINE 1 % INJECTION    lidocaine HCl 10 mg/mL (1 %) injection solution   INJ    LOSARTAN (COZAAR) 100 MG TABLET    Take 100 mg by mouth daily. MELOXICAM (MOBIC) 7.5 MG TABLET    meloxicam 7.5 mg tablet    OMEPRAZOLE (PRILOSEC) 40 MG DELAYED RELEASE CAPSULE    Take 40 mg by mouth daily. PREGABALIN (LYRICA) 100 MG CAPSULE    Take 100 mg by mouth 2 times daily. RIVAROXABAN (XARELTO) 20 MG TABS TABLET    Take 20 mg by mouth nightly     ROSUVASTATIN (CRESTOR) 10 MG TABLET    rosuvastatin 10 mg tablet    SYMBICORT 160-4.5 MCG/ACT AERO        TRIAMCINOLONE ACETONIDE (KENALOG) 10 MG/ML INJECTION    Kenalog 10 mg/mL suspension for injection   INJ    VARENICLINE (CHANTIX) 1 MG TABLET    Take 1 mg by mouth 2 times daily       ALLERGIES     is allergic to iv contrast [iodides]. FAMILY HISTORY     She indicated that her mother is . She indicated that her father is . She indicated that two of her four sisters are . She indicated that only one of her four brothers is alive. She indicated that the status of her maternal aunt is unknown.     family history includes Cancer in her maternal aunt, mother, and sister; Heart Attack in her brother and father; Heart Defect in her sister; Heart Disease in her father; High Blood Pressure in her father; Liver Disease in her brother; Stroke in her father. SOCIAL HISTORY      reports that she quit smoking about 8 years ago. She has quit using smokeless tobacco. She reports that she does not drink alcohol and does not use drugs. PHYSICAL EXAM     INITIAL VITALS:  height is 5' 3\" (1.6 m) and weight is 79.8 kg (176 lb). Her oral temperature is 98.2 °F (36.8 °C). Her blood pressure is 111/46 (abnormal) and her pulse is 86. Her respiration is 16 and oxygen saturation is 93%. The patient is alert and oriented, in mild distress due to pain. HEENT is atraumatic. Pupils are PERRL at 4 mm. Mucous membranes moist.    Neck is supple with no pain or step-off. Heart sounds regular rate and rhythm with no gallops, murmurs, or rubs.     Lungs clear, no wheezes, rales or rhonchi. Abdomen: soft, nontender with no pain to palpation. Musculoskeletal exam: Patient locates pain in the right proximal humerus area. No pain over the clavicle. No obvious deformity consistent with dislocation. Patient can flex and extend normally at the right elbow. Normal radial pulse. Normal  strength in the hand. Patient denies new pain in the midline of the thoracic or lumbar spine. Patient denies lower extremity injury. Skin: no rash or edema. Neurological exam reveals cranial nerves 2 through 12 grossly intact. Patient has equal  and normal deep tendon reflexes. Psychiatric: Appropriate. Lymphatics.:  No lymphadenopathy. DIFFERENTIAL DIAGNOSIS/ MDM:     Humerus fracture, dislocation of the shoulder, intracranial hemorrhage, contusion    DIAGNOSTIC RESULTS       RADIOLOGY:   I reviewed the radiologist interpretations:  XR SHOULDER RIGHT (MIN 2 VIEWS)   Final Result   Oblique nondisplaced fracture proximal humerus         XR HUMERUS RIGHT (MIN 2 VIEWS)   Final Result   Oblique nondisplaced fracture proximal humerus         CT HEAD WO CONTRAST   Final Result   No evidence of acute intracranial process. Age-appropriate atrophy and mild   chronic small vessel ischemic changes noted. XR SHOULDER RIGHT (MIN 2 VIEWS) (Final result)  Result time 04/24/22 16:47:42  Final result by Ammy Yeh MD (04/24/22 16:47:42)                Impression:    Oblique nondisplaced fracture proximal humerus             Narrative:    EXAMINATION:   THREE XRAY VIEWS OF THE RIGHT SHOULDER; TWO XRAY VIEWS OF THE RIGHT HUMERUS     4/24/2022 4:34 pm     COMPARISON:   None. HISTORY:   ORDERING SYSTEM PROVIDED HISTORY: fall   TECHNOLOGIST PROVIDED HISTORY:   fall   Reason for Exam: Right shoulder pain post fall today     FINDINGS:   Right shoulder: Oblique nondisplaced fracture proximal humerus.  No   dislocation.  No other fractures.      Right humerus: Remainder of the right humerus is intact.  No dislocation.                       XR HUMERUS RIGHT (MIN 2 VIEWS) (Final result)  Result time 04/24/22 16:47:42  Final result by iPppa Ruiz MD (04/24/22 16:47:42)                Impression:    Oblique nondisplaced fracture proximal humerus             Narrative:    EXAMINATION:   THREE XRAY VIEWS OF THE RIGHT SHOULDER; TWO XRAY VIEWS OF THE RIGHT HUMERUS     4/24/2022 4:34 pm     COMPARISON:   None. HISTORY:   ORDERING SYSTEM PROVIDED HISTORY: fall   TECHNOLOGIST PROVIDED HISTORY:   fall   Reason for Exam: Right shoulder pain post fall today     FINDINGS:   Right shoulder: Oblique nondisplaced fracture proximal humerus.  No   dislocation.  No other fractures. Right humerus: Remainder of the right humerus is intact.  No dislocation.                       CT HEAD WO CONTRAST (Final result)  Result time 04/24/22 16:56:44  Final result by Lindsey Mclean MD (04/24/22 16:56:44)                Impression:    No evidence of acute intracranial process.  Age-appropriate atrophy and mild   chronic small vessel ischemic changes noted. Narrative:    EXAMINATION:   CT OF THE HEAD WITHOUT CONTRAST  4/24/2022 3:34 pm     TECHNIQUE:   CT of the head was performed without the administration of intravenous   contrast. Dose modulation, iterative reconstruction, and/or weight based   adjustment of the mA/kV was utilized to reduce the radiation dose to as low   as reasonably achievable. COMPARISON:   None. HISTORY:   ORDERING SYSTEM PROVIDED HISTORY: fall   TECHNOLOGIST PROVIDED HISTORY:     fall   Decision Support Exception - unselect if not a suspected or confirmed   emergency medical condition->Emergency Medical Condition (MA)   Reason for Exam: Fall today. PT is unsure if she hit her head     FINDINGS:   BRAIN/VENTRICLES: The gyri and sulci have a normal appearance.  There is   cortical and cerebellar volume loss with associated ex vacuo ventricular   dilation, appropriate for age. Dasie Riser diffuse periventricular and subcortical   deep white matter hypoattenuation noted, findings compatible with chronic   small vessel ischemic disease. The gray-white matter differentiation is   otherwise preserved throughout. Juris Leisa is no acute hemorrhage, mass, or mass   effect.  No evidence of acute territorial infarct.  No abnormal extraaxial   fluid collections. ORBITS:  The visualized portion of the orbits demonstrate no acute   abnormality. SINUSES:  The mastoid air cells are normally aerated.  The visualized   paranasal sinuses are grossly clear. SOFT TISSUES/SKULL:  No significant abnormality of the visualized skull or   soft tissues. No acute fracture. No scalp hematoma.                         EMERGENCY DEPARTMENT COURSE:   Vitals:    Vitals:    04/24/22 1525 04/24/22 1530 04/24/22 1634   BP: (!) 121/51 133/76 (!) 111/46   Pulse: 102  86   Resp: 18  16   Temp: 98.2 °F (36.8 °C)     TempSrc: Oral     SpO2: 96% 96% 93%   Weight: 79.8 kg (176 lb)     Height: 5' 3\" (1.6 m)       -------------------------  BP: (!) 111/46, Temp: 98.2 °F (36.8 °C), Pulse: 86, Resp: 16      Re-evaluation Notes      The patient requested medicine for pain. She was provided a sling for her fracture. She has an orthopedist that she sees for her back and she can ask him about orthopedic follow-up. The patient is discharged in good condition. Controlled Substance Monitoring:    Acute and Chronic Pain Monitoring:   No flowsheet data found. PROCEDURES:    The patient was placed in a sling by the nurse. The patient had good color, sensation, and motion of the fingers after placement. FINAL IMPRESSION      1.  Closed fracture of proximal end of right humerus, unspecified fracture morphology, initial encounter          DISPOSITION/PLAN   DISPOSITION        Condition on Disposition    good    PATIENT REFERRED TO:  your orthopedist    In 1 week        DISCHARGE MEDICATIONS:  New Prescriptions    HYDROCODONE-ACETAMINOPHEN (NORCO) 5-325 MG PER TABLET    Take 1 tablet by mouth every 6 hours as needed for Pain for up to 3 days. Intended supply: 3 days.  Take lowest dose possible to manage pain       (Please note that portions of this note were completed with a voice recognition program.  Efforts were made to edit the dictations but occasionally words are mis-transcribed.)    Anum Lucia MD,, MD   Attending Emergency Physician         Dwaine Dai MD  04/24/22 0128

## 2022-04-24 NOTE — ED TRIAGE NOTES
Patient reports that her bathroom has a step at the doorway, she tried to use the door handle while stepping into bathroom, slipped and then fall onto right shoulder. She reports since the fall she has had right shoulder pain and has not been able to fully move right shoulder due to pain. Patient is unsure if she hit her head but had no loss of consciousness, patient takes daily Xarelto.

## 2022-04-24 NOTE — ED NOTES
Patient falling asleep, Spo2 drops to 86% on room air, when nurse enters room patient arouses and Spo2 increases above 92%. Patient states she has sleep apnea and normally wears a CPAP when sleeping. Dr. Bryn Lara notified, patient placed on nasal cannula 2 liters.       Evens Clayton RN  04/24/22 [id number]

## 2022-07-08 ENCOUNTER — APPOINTMENT (OUTPATIENT)
Dept: GENERAL RADIOLOGY | Age: 72
End: 2022-07-08
Payer: MEDICARE

## 2022-07-08 ENCOUNTER — HOSPITAL ENCOUNTER (OUTPATIENT)
Age: 72
Setting detail: OBSERVATION
Discharge: HOSPICE | End: 2022-07-09
Attending: EMERGENCY MEDICINE | Admitting: HOSPITALIST
Payer: MEDICARE

## 2022-07-08 DIAGNOSIS — J18.9 PNEUMONIA OF RIGHT LOWER LOBE DUE TO INFECTIOUS ORGANISM: ICD-10-CM

## 2022-07-08 DIAGNOSIS — N39.0 URINARY TRACT INFECTION WITHOUT HEMATURIA, SITE UNSPECIFIED: ICD-10-CM

## 2022-07-08 DIAGNOSIS — D64.9 ANEMIA, UNSPECIFIED TYPE: Primary | ICD-10-CM

## 2022-07-08 DIAGNOSIS — E87.1 HYPONATREMIA: ICD-10-CM

## 2022-07-08 DIAGNOSIS — J18.9 PNEUMONIA DUE TO INFECTIOUS ORGANISM, UNSPECIFIED LATERALITY, UNSPECIFIED PART OF LUNG: ICD-10-CM

## 2022-07-08 LAB
-: ABNORMAL
ABSOLUTE EOS #: 0.26 K/UL (ref 0–0.4)
ABSOLUTE LYMPH #: 3.23 K/UL (ref 1–4.8)
ABSOLUTE MONO #: 0.91 K/UL (ref 0.1–1.2)
ALBUMIN SERPL-MCNC: 3.3 G/DL (ref 3.5–5.2)
ALBUMIN/GLOBULIN RATIO: 1 (ref 1–2.5)
ALP BLD-CCNC: 163 U/L (ref 35–104)
ALT SERPL-CCNC: 11 U/L (ref 5–33)
AMMONIA: <10 UMOL/L (ref 11–51)
ANION GAP SERPL CALCULATED.3IONS-SCNC: 15 MMOL/L (ref 9–17)
AST SERPL-CCNC: 14 U/L
BACTERIA: ABNORMAL
BASOPHILS # BLD: 0 % (ref 0–2)
BASOPHILS ABSOLUTE: 0.03 K/UL (ref 0–0.2)
BILIRUB SERPL-MCNC: 0.4 MG/DL (ref 0.3–1.2)
BILIRUBIN URINE: NEGATIVE
BUN BLDV-MCNC: 17 MG/DL (ref 8–23)
CALCIUM SERPL-MCNC: 9.3 MG/DL (ref 8.6–10.4)
CHLORIDE BLD-SCNC: 90 MMOL/L (ref 98–107)
CO2: 25 MMOL/L (ref 20–31)
COLOR: YELLOW
CREAT SERPL-MCNC: 1.37 MG/DL (ref 0.5–0.9)
EOSINOPHILS RELATIVE PERCENT: 2 % (ref 1–4)
EPITHELIAL CELLS UA: ABNORMAL /HPF (ref 0–5)
GFR AFRICAN AMERICAN: 46 ML/MIN
GFR NON-AFRICAN AMERICAN: 38 ML/MIN
GFR SERPL CREATININE-BSD FRML MDRD: ABNORMAL ML/MIN/{1.73_M2}
GLUCOSE BLD-MCNC: 87 MG/DL (ref 65–105)
GLUCOSE BLD-MCNC: 87 MG/DL (ref 70–99)
GLUCOSE URINE: NEGATIVE
HCT VFR BLD CALC: 30 % (ref 36–46)
HEMOGLOBIN: 9.7 G/DL (ref 12–16)
KETONES, URINE: NEGATIVE
LACTIC ACID: 1 MMOL/L (ref 0.5–2.2)
LACTIC ACID: 2.3 MMOL/L (ref 0.5–2.2)
LEUKOCYTE ESTERASE, URINE: ABNORMAL
LYMPHOCYTES # BLD: 27 % (ref 24–44)
MCH RBC QN AUTO: 30.8 PG (ref 26–34)
MCHC RBC AUTO-ENTMCNC: 32.3 G/DL (ref 31–37)
MCV RBC AUTO: 95.2 FL (ref 80–100)
MONOCYTES # BLD: 8 % (ref 2–11)
NITRITE, URINE: NEGATIVE
OTHER OBSERVATIONS UA: ABNORMAL
PDW BLD-RTO: 16.8 % (ref 12.5–15.4)
PH UA: 6 (ref 5–8)
PLATELET # BLD: 266 K/UL (ref 140–450)
PMV BLD AUTO: 10.9 FL (ref 8–14)
POTASSIUM SERPL-SCNC: 3.6 MMOL/L (ref 3.7–5.3)
PROTEIN UA: NEGATIVE
RBC # BLD: 3.15 M/UL (ref 4–5.2)
RBC UA: ABNORMAL /HPF (ref 0–2)
SEG NEUTROPHILS: 63 % (ref 36–66)
SEGMENTED NEUTROPHILS ABSOLUTE COUNT: 7.7 K/UL (ref 1.8–7.7)
SODIUM BLD-SCNC: 130 MMOL/L (ref 135–144)
SPECIFIC GRAVITY UA: 1.01 (ref 1–1.03)
TOTAL PROTEIN: 6.5 G/DL (ref 6.4–8.3)
TSH SERPL DL<=0.05 MIU/L-ACNC: 1.47 UIU/ML (ref 0.3–5)
TURBIDITY: ABNORMAL
URINE HGB: NEGATIVE
UROBILINOGEN, URINE: NORMAL
WBC # BLD: 12.1 K/UL (ref 3.5–11)
WBC UA: ABNORMAL /HPF (ref 0–5)

## 2022-07-08 PROCEDURE — 82947 ASSAY GLUCOSE BLOOD QUANT: CPT

## 2022-07-08 PROCEDURE — 85025 COMPLETE CBC W/AUTO DIFF WBC: CPT

## 2022-07-08 PROCEDURE — 81001 URINALYSIS AUTO W/SCOPE: CPT

## 2022-07-08 PROCEDURE — 96368 THER/DIAG CONCURRENT INF: CPT

## 2022-07-08 PROCEDURE — 96375 TX/PRO/DX INJ NEW DRUG ADDON: CPT

## 2022-07-08 PROCEDURE — 96365 THER/PROPH/DIAG IV INF INIT: CPT

## 2022-07-08 PROCEDURE — 71045 X-RAY EXAM CHEST 1 VIEW: CPT

## 2022-07-08 PROCEDURE — 87186 SC STD MICRODIL/AGAR DIL: CPT

## 2022-07-08 PROCEDURE — 84443 ASSAY THYROID STIM HORMONE: CPT

## 2022-07-08 PROCEDURE — 93005 ELECTROCARDIOGRAM TRACING: CPT | Performed by: EMERGENCY MEDICINE

## 2022-07-08 PROCEDURE — 6360000002 HC RX W HCPCS: Performed by: EMERGENCY MEDICINE

## 2022-07-08 PROCEDURE — G0378 HOSPITAL OBSERVATION PER HR: HCPCS

## 2022-07-08 PROCEDURE — 36415 COLL VENOUS BLD VENIPUNCTURE: CPT

## 2022-07-08 PROCEDURE — 2580000003 HC RX 258: Performed by: EMERGENCY MEDICINE

## 2022-07-08 PROCEDURE — 87086 URINE CULTURE/COLONY COUNT: CPT

## 2022-07-08 PROCEDURE — 99285 EMERGENCY DEPT VISIT HI MDM: CPT

## 2022-07-08 PROCEDURE — 96361 HYDRATE IV INFUSION ADD-ON: CPT

## 2022-07-08 PROCEDURE — 96374 THER/PROPH/DIAG INJ IV PUSH: CPT

## 2022-07-08 PROCEDURE — 87088 URINE BACTERIA CULTURE: CPT

## 2022-07-08 PROCEDURE — 80053 COMPREHEN METABOLIC PANEL: CPT

## 2022-07-08 PROCEDURE — 83605 ASSAY OF LACTIC ACID: CPT

## 2022-07-08 PROCEDURE — 82140 ASSAY OF AMMONIA: CPT

## 2022-07-08 RX ORDER — AZITHROMYCIN 250 MG/1
500 TABLET, FILM COATED ORAL EVERY 24 HOURS
Status: DISCONTINUED | OUTPATIENT
Start: 2022-07-09 | End: 2022-07-09 | Stop reason: HOSPADM

## 2022-07-08 RX ORDER — ALPRAZOLAM 0.5 MG/1
1 TABLET ORAL 3 TIMES DAILY PRN
Status: DISCONTINUED | OUTPATIENT
Start: 2022-07-08 | End: 2022-07-09 | Stop reason: HOSPADM

## 2022-07-08 RX ORDER — IPRATROPIUM BROMIDE AND ALBUTEROL SULFATE 2.5; .5 MG/3ML; MG/3ML
1 SOLUTION RESPIRATORY (INHALATION)
Status: DISCONTINUED | OUTPATIENT
Start: 2022-07-09 | End: 2022-07-09 | Stop reason: HOSPADM

## 2022-07-08 RX ORDER — ONDANSETRON 2 MG/ML
4 INJECTION INTRAMUSCULAR; INTRAVENOUS EVERY 6 HOURS PRN
Status: DISCONTINUED | OUTPATIENT
Start: 2022-07-08 | End: 2022-07-09 | Stop reason: HOSPADM

## 2022-07-08 RX ORDER — BENZTROPINE MESYLATE 0.5 MG/1
TABLET ORAL
Status: ON HOLD | COMMUNITY
Start: 2022-06-29 | End: 2022-07-09 | Stop reason: HOSPADM

## 2022-07-08 RX ORDER — LEVOTHYROXINE SODIUM 0.05 MG/1
50 TABLET ORAL DAILY
Status: DISCONTINUED | OUTPATIENT
Start: 2022-07-09 | End: 2022-07-09 | Stop reason: HOSPADM

## 2022-07-08 RX ORDER — 0.9 % SODIUM CHLORIDE 0.9 %
1000 INTRAVENOUS SOLUTION INTRAVENOUS ONCE
Status: COMPLETED | OUTPATIENT
Start: 2022-07-08 | End: 2022-07-09

## 2022-07-08 RX ORDER — BUDESONIDE AND FORMOTEROL FUMARATE DIHYDRATE 160; 4.5 UG/1; UG/1
2 AEROSOL RESPIRATORY (INHALATION) 2 TIMES DAILY
Status: DISCONTINUED | OUTPATIENT
Start: 2022-07-09 | End: 2022-07-09 | Stop reason: HOSPADM

## 2022-07-08 RX ORDER — SODIUM CHLORIDE 0.9 % (FLUSH) 0.9 %
3 SYRINGE (ML) INJECTION EVERY 8 HOURS
Status: DISCONTINUED | OUTPATIENT
Start: 2022-07-08 | End: 2022-07-09 | Stop reason: HOSPADM

## 2022-07-08 RX ORDER — PREGABALIN 100 MG/1
100 CAPSULE ORAL 2 TIMES DAILY
Status: DISCONTINUED | OUTPATIENT
Start: 2022-07-09 | End: 2022-07-09 | Stop reason: HOSPADM

## 2022-07-08 RX ORDER — SODIUM CHLORIDE 0.9 % (FLUSH) 0.9 %
5-40 SYRINGE (ML) INJECTION EVERY 12 HOURS SCHEDULED
Status: DISCONTINUED | OUTPATIENT
Start: 2022-07-09 | End: 2022-07-09 | Stop reason: HOSPADM

## 2022-07-08 RX ORDER — SODIUM CHLORIDE 9 MG/ML
INJECTION, SOLUTION INTRAVENOUS PRN
Status: DISCONTINUED | OUTPATIENT
Start: 2022-07-08 | End: 2022-07-09 | Stop reason: HOSPADM

## 2022-07-08 RX ORDER — ONDANSETRON 4 MG/1
4 TABLET, ORALLY DISINTEGRATING ORAL EVERY 8 HOURS PRN
Status: DISCONTINUED | OUTPATIENT
Start: 2022-07-08 | End: 2022-07-09 | Stop reason: HOSPADM

## 2022-07-08 RX ORDER — AMLODIPINE BESYLATE 5 MG/1
5 TABLET ORAL DAILY
COMMUNITY

## 2022-07-08 RX ORDER — ROSUVASTATIN CALCIUM 5 MG/1
10 TABLET, COATED ORAL NIGHTLY
Status: DISCONTINUED | OUTPATIENT
Start: 2022-07-09 | End: 2022-07-09 | Stop reason: HOSPADM

## 2022-07-08 RX ORDER — ALBUTEROL SULFATE 2.5 MG/3ML
2.5 SOLUTION RESPIRATORY (INHALATION)
Status: DISCONTINUED | OUTPATIENT
Start: 2022-07-08 | End: 2022-07-09 | Stop reason: HOSPADM

## 2022-07-08 RX ORDER — ACETAMINOPHEN 325 MG/1
650 TABLET ORAL EVERY 6 HOURS PRN
Status: DISCONTINUED | OUTPATIENT
Start: 2022-07-08 | End: 2022-07-09 | Stop reason: HOSPADM

## 2022-07-08 RX ORDER — SODIUM CHLORIDE 0.9 % (FLUSH) 0.9 %
10 SYRINGE (ML) INJECTION PRN
Status: DISCONTINUED | OUTPATIENT
Start: 2022-07-08 | End: 2022-07-09 | Stop reason: HOSPADM

## 2022-07-08 RX ORDER — ASPIRIN 81 MG/1
81 TABLET ORAL DAILY
Status: DISCONTINUED | OUTPATIENT
Start: 2022-07-09 | End: 2022-07-09 | Stop reason: HOSPADM

## 2022-07-08 RX ORDER — PANTOPRAZOLE SODIUM 40 MG/1
40 TABLET, DELAYED RELEASE ORAL
Status: DISCONTINUED | OUTPATIENT
Start: 2022-07-09 | End: 2022-07-09 | Stop reason: HOSPADM

## 2022-07-08 RX ORDER — ACETAMINOPHEN 650 MG/1
650 SUPPOSITORY RECTAL EVERY 6 HOURS PRN
Status: DISCONTINUED | OUTPATIENT
Start: 2022-07-08 | End: 2022-07-09 | Stop reason: HOSPADM

## 2022-07-08 RX ADMIN — SODIUM CHLORIDE 1000 ML: 9 INJECTION, SOLUTION INTRAVENOUS at 20:30

## 2022-07-08 RX ADMIN — CEFTRIAXONE SODIUM 1000 MG: 1 INJECTION, POWDER, FOR SOLUTION INTRAMUSCULAR; INTRAVENOUS at 20:49

## 2022-07-08 RX ADMIN — AZITHROMYCIN MONOHYDRATE 500 MG: 500 INJECTION, POWDER, LYOPHILIZED, FOR SOLUTION INTRAVENOUS at 21:19

## 2022-07-08 ASSESSMENT — ENCOUNTER SYMPTOMS
STRIDOR: 0
SHORTNESS OF BREATH: 0
EYE PAIN: 0
CONSTIPATION: 0
NAUSEA: 0
SORE THROAT: 0
VOMITING: 0
COUGH: 1
ABDOMINAL PAIN: 0
EYE DISCHARGE: 0
COLOR CHANGE: 0
WHEEZING: 0
DIARRHEA: 0
EYE REDNESS: 0

## 2022-07-08 ASSESSMENT — LIFESTYLE VARIABLES: HOW OFTEN DO YOU HAVE A DRINK CONTAINING ALCOHOL: NEVER

## 2022-07-08 NOTE — ED PROVIDER NOTES
[facility] ED  EMERGENCY DEPARTMENT ENCOUNTER      Pt Name: Brad Cardoso  MRN: [id number]  [place] 1950  Date of evaluation: 7/8/2022  Provider: Mike Brar MD    CHIEF COMPLAINT       Chief Complaint   Patient presents with    Dehydration     pt. states her mouth has been dry 2-3 weeks    Altered Mental Status     2-3 weeks    Fatigue       CRITICAL CARE TIME   Total Critical Care time was 20 minutes, excluding separately reportable procedures. There was a high probability of clinically significant/life threatening deterioration in the patient's condition which required my urgent intervention. HISTORY OF PRESENT ILLNESS  (Location/Symptom, Timing/Onset, Context/Setting, Quality, Duration, Modifying Factors, Severity.)   Brad Cardoso is a 73 y.o. female who presents to the emergency department complaining of dehydration and feeling like she is fatigued for the past 2 to 3 weeks. She is concerned that her kidneys may have problems. Family relates she was recently treated for UTI but has completed that course of antibiotics as well as a steroid taper. Patient denies any pain in particular. Denies that she has having trouble with urination. She says she has a cough but that is not new. She is a smoker still and smokes over a pack a day. Nursing Notes were reviewed. REVIEW OF SYSTEMS    (2-9 systems for level 4, 10 or more for level 5)     Review of Systems   Constitutional: Positive for fatigue. Negative for activity change, appetite change, chills and fever. HENT: Negative for congestion, ear pain and sore throat. Eyes: Negative for pain, discharge and redness. Respiratory: Positive for cough. Negative for shortness of breath, wheezing and stridor. Cardiovascular: Negative for chest pain. Gastrointestinal: Negative for abdominal pain, constipation, diarrhea, nausea and vomiting.    Genitourinary: Negative for decreased urine volume and difficulty urinating. Musculoskeletal: Negative for arthralgias and myalgias. Skin: Negative for color change and rash. Neurological: Negative for dizziness, weakness and headaches. Psychiatric/Behavioral: Negative for behavioral problems and confusion. Except as noted above the remainder of the review of systems was reviewed and negative. PAST MEDICAL HISTORY     Past Medical History:   Diagnosis Date    A-fib [facility])     Anxiety     Arthritis     Blood transfusion reaction     2001 with surgery    CAD (coronary artery disease)     Carotid stenosis     COPD (chronic obstructive pulmonary disease) (HCC)     GERD (gastroesophageal reflux disease)     Hepatitis B 1972    History of blood transfusion 2001    Hyperlipidemia     Hypertension     Hypothyroid     Kidney disease     GFR 60, with mass lower left chamber and right upper   Stage 3    Legally blind     patient gets eye appointment    Macular edema 2013    eye injections    PVD [facility]per[facility]) [facility]     Sleep apnea     CPAP    Subclavian artery stenosis ([facility]        SURGICAL HISTORY       Past Surgical History:   Procedure Laterality Date    AORTO-FEMORAL BYPASS GRAFT  2001    CARDIAC CATHETERIZATION  10/2012    no stents    CAROTID ENDARTERECTOMY      CAROTID ENDARTERECTOMY Left 05/16/2019    CAROTID ENDARTERECTOMY LEFT (Left Neck)    CAROTID ENDARTERECTOMY Left 5/16/2019    CAROTID ENDARTERECTOMY LEFT performed by Oskar Sewell MD at [address] [place] [address] Bilateral     CHOLECYSTECTOMY  05/05/2014    laparscopic    EYE SURGERY      LASIK LEFT EYE    EYE SURGERY      catarat extraction    TOE AMPUTATION  2001    gangrene 2001    TONSILLECTOMY      TUMOR REMOVAL Left     carotid       CURRENT MEDICATIONS       Previous Medications    ALBUTEROL (PROVENTIL HFA;VENTOLIN HFA) 108 (90 BASE) MCG/ACT INHALER    Inhale 2 puffs into the lungs every 4 hours as needed for Wheezing.     ALENDRONATE (FOSAMAX) 35 MG TABLET    Take 35 mg by mouth every 7 days    ALPRAZOLAM (XANAX) 1 MG TABLET    Take 1 mg by mouth 3 times daily as needed for Sleep or Anxiety. .    ASPIRIN 81 MG EC TABLET    Take 81 mg by mouth daily    BREZTRI AEROSPHERE 160-9-4.8 MCG/ACT AERO        CALCIUM CARBONATE 1500 (600 CA) MG TABS TABLET        CHOLECALCIFEROL (VITAMIN D) 2000 UNITS TABS    Take  by mouth. CYANOCOBALAMIN 1000 MCG/ML INJECTION    Inject 1,000 mcg into the muscle once. Patient takes monthly    FEXOFENADINE (ALLEGRA) 180 MG TABLET    Take 180 mg by mouth daily    FLUTICASONE (FLONASE) 50 MCG/ACT NASAL SPRAY    2 sprays by Nasal route daily. FLUTICASONE-SALMETEROL (ADVAIR) 250-50 MCG/DOSE AEPB    Inhale 1 puff into the lungs every 12 hours. FOLIC ACID (FOLVITE) 1 MG TABLET    Take 1 mg by mouth daily. FUROSEMIDE (LASIX) 40 MG TABLET        INFLUENZA VAC HIGH-DOSE QUAD (FLUZONE HIGH-DOSE QUADRIVALENT) 0.7 ML FILEMON INJECTION    Fluzone High-Dose Quad 2020-21 (PF) 240 mcg/0.7 mL IM syringe   ADM 0.7ML IM UTD    LEVOTHYROXINE (SYNTHROID) 50 MCG TABLET    Take 50 mcg by mouth Daily. LIDOCAINE 1 % INJECTION    lidocaine HCl 10 mg/mL (1 %) injection solution   INJ    LOSARTAN (COZAAR) 100 MG TABLET    Take 100 mg by mouth daily. MELOXICAM (MOBIC) 7.5 MG TABLET    meloxicam 7.5 mg tablet    OMEPRAZOLE (PRILOSEC) 40 MG DELAYED RELEASE CAPSULE    Take 40 mg by mouth daily. PREGABALIN (LYRICA) 100 MG CAPSULE    Take 100 mg by mouth 2 times daily.     RIVAROXABAN (XARELTO) 20 MG TABS TABLET    Take 20 mg by mouth nightly     ROSUVASTATIN (CRESTOR) 10 MG TABLET    rosuvastatin 10 mg tablet    SYMBICORT 160-4.5 MCG/ACT AERO        TRIAMCINOLONE ACETONIDE (KENALOG) 10 MG/ML INJECTION    Kenalog 10 mg/mL suspension for injection   INJ    VARENICLINE (CHANTIX) 1 MG TABLET    Take 1 mg by mouth 2 times daily       ALLERGIES     Iv contrast [iodides]    FAMILY HISTORY           Problem Relation Age of Onset    Heart Attack Father    Alexandro Navarro Stroke Father     High Blood Pressure Father     Heart Disease Father     Cancer Mother         Multiple myeloma    Cancer Sister         Lung with mets to brain    Cancer Maternal Aunt         Several aunts had unknown cancer    Heart Attack Brother     Liver Disease Brother     Heart Defect Sister      Family Status   Relation Name Status    Father   at age 58        3 MI's   Mary Se Mother   at age 58   Mary Se Sister     Mary Se Brother     Mary Se Brother  [de-identified]    Sister     Mary Se Sister Carolyn Boast (Not Specified)   Zane Robison  (Not Specified)    Brother Tootie Villa (Not Specified)    Brother  (Not Specified)    Sister John Bhakta (Not Specified)        SOCIAL HISTORY      reports that she has been smoking. She has been smoking about 1.00 pack per day. She has quit using smokeless tobacco. She reports that she does not drink alcohol and does not use drugs. PHYSICAL EXAM    (up to 7 for level 4, 8 or more for level 5)     ED Triage Vitals [22 1846]   BP Temp Temp Source Heart Rate Resp SpO2 Height Weight   (!) 120/54 98.2 °F (36.8 °C) Oral 90 16 98 % 5' 3\" (1.6 m) 173 lb (78.5 kg)     Physical Exam  Vitals and nursing note reviewed. Constitutional:       General: She is not in acute distress. Appearance: She is well-developed. She is not ill-appearing, toxic-appearing or diaphoretic. HENT:      Head: Normocephalic and atraumatic. Right Ear: External ear normal.      Left Ear: External ear normal.      Nose: Nose normal.      Mouth/Throat:      Mouth: Mucous membranes are moist.   Eyes:      General:         Right eye: No discharge. Left eye: No discharge. Extraocular Movements: Extraocular movements intact. Conjunctiva/sclera: Conjunctivae normal.      Pupils: Pupils are equal, round, and reactive to light. Cardiovascular:      Rate and Rhythm: Regular rhythm. Tachycardia present. Heart sounds: Normal heart sounds. No murmur heard.       Pulmonary: Effort: Pulmonary effort is normal. No respiratory distress. Breath sounds: Normal breath sounds. No wheezing, rhonchi or rales. Chest:      Chest wall: No tenderness. Abdominal:      General: Bowel sounds are normal. There is no distension. Palpations: Abdomen is soft. There is no mass. Tenderness: There is no abdominal tenderness. There is no guarding or rebound. Musculoskeletal:         General: Normal range of motion. Cervical back: Normal range of motion and neck supple. Right lower leg: No edema. Left lower leg: No edema. Skin:     General: Skin is warm. Findings: No rash. Neurological:      General: No focal deficit present. Mental Status: She is alert. She is disoriented. Motor: No abnormal muscle tone. Psychiatric:         Mood and Affect: Mood normal.         Behavior: Behavior normal.         DIAGNOSTIC RESULTS     EKG: All EKG's are interpreted by the Emergency Department Physician who either signs or Co-signs this chart in the absence of a cardiologist.    EKG Interpretation    Interpreted by me    Rhythm: atrial fibrillation -regular  Rate: Regular  Axis: normal  Ectopy: none  Conduction: irregularly irregular  ST Segments: nonspecific changes  T Waves: no acute change  Q Waves: nonspecific    Clinical Impression: atrial fibrillation with regular ventricular rate    RADIOLOGY:   Non-plain film images such as CT, Ultrasound and MRI are read by the radiologist.   Interpretation per the Radiologist below, if available at the time of this note:    XR CHEST PORTABLE   Final Result   Mild cardiomegaly. Focal opacity at the right base which may be related to   focal airspace disease.                ED BEDSIDE ULTRASOUND:   Performed by ED Physician - none    LABS:  Labs Reviewed   CBC WITH AUTO DIFFERENTIAL - Abnormal; Notable for the following components:       Result Value    WBC 12.1 (*)     RBC 3.15 (*)     Hemoglobin 9.7 (*)     Hematocrit 30.0 (*) RDW 16.8 (*)     All other components within normal limits   COMPREHENSIVE METABOLIC PANEL - Abnormal; Notable for the following components:    CREATININE 1.37 (*)     Sodium 130 (*)     Potassium 3.6 (*)     Chloride 90 (*)     Alkaline Phosphatase 163 (*)     Albumin 3.3 (*)     GFR Non- 38 (*)     GFR  46 (*)     All other components within normal limits   LACTIC ACID - Abnormal; Notable for the following components:    Lactic Acid 2.3 (*)     All other components within normal limits   AMMONIA - Abnormal; Notable for the following components:    Ammonia <10 (*)     All other components within normal limits   URINALYSIS WITH REFLEX TO CULTURE - Abnormal; Notable for the following components:    Turbidity UA SLIGHTLY CLOUDY (*)     Leukocyte Esterase, Urine SMALL (*)     All other components within normal limits   MICROSCOPIC URINALYSIS - Abnormal; Notable for the following components:    Bacteria, UA MANY (*)     Other Observations UA Culture ordered based on defined criteria. (*)     All other components within normal limits   CULTURE, URINE   TSH WITH REFLEX   LACTIC ACID   POC GLUCOSE FINGERSTICK   POCT GLUCOSE       All other labs were within normal range or not returned as of this dictation. EMERGENCY DEPARTMENT COURSE and DIFFERENTIAL DIAGNOSIS/MDM:   Vitals:    Vitals:    07/08/22 1846 07/08/22 2001   BP: (!) 120/54    Pulse: 90 94   Resp: 16    Temp: 98.2 °F (36.8 °C)    TempSrc: Oral    SpO2: 98%    Weight: 78.5 kg (173 lb)    Height: 5' 3\" (1.6 m)      We did discuss lab work and imaging as well as some fluids. She is comfortable with plan of care and verbalized understanding. I am concerned that she could have another urinary tract infection or certainly be dehydrated. She may also have pneumonia or bronchitis as she is a smoker and is complaining of a cough. I did go over results with her and the need for admission. She is agreeable.   I did speak with

## 2022-07-08 NOTE — ED NOTES
Blood glucose bedside obtained [address], [address] [facility]  07/08/22 1958

## 2022-07-09 ENCOUNTER — APPOINTMENT (OUTPATIENT)
Dept: GENERAL RADIOLOGY | Age: 72
End: 2022-07-09
Payer: MEDICARE

## 2022-07-09 VITALS
BODY MASS INDEX: 31.52 KG/M2 | RESPIRATION RATE: 18 BRPM | OXYGEN SATURATION: 98 % | HEIGHT: 63 IN | HEART RATE: 82 BPM | WEIGHT: 177.91 LBS | TEMPERATURE: 97.5 F | SYSTOLIC BLOOD PRESSURE: 108 MMHG | DIASTOLIC BLOOD PRESSURE: 51 MMHG

## 2022-07-09 LAB
ABSOLUTE EOS #: 0.3 K/UL (ref 0–0.4)
ABSOLUTE LYMPH #: 1.06 K/UL (ref 1–4.8)
ABSOLUTE MONO #: 0.46 K/UL (ref 0.1–0.8)
ANION GAP SERPL CALCULATED.3IONS-SCNC: 11 MMOL/L (ref 9–17)
BASOPHILS # BLD: 1 % (ref 0–2)
BASOPHILS ABSOLUTE: 0.08 K/UL (ref 0–0.2)
BUN BLDV-MCNC: 13 MG/DL (ref 8–23)
CALCIUM SERPL-MCNC: 8.5 MG/DL (ref 8.6–10.4)
CHLORIDE BLD-SCNC: 100 MMOL/L (ref 98–107)
CO2: 26 MMOL/L (ref 20–31)
CREAT SERPL-MCNC: 1.08 MG/DL (ref 0.5–0.9)
EKG ATRIAL RATE: 72 BPM
EKG Q-T INTERVAL: 386 MS
EKG QRS DURATION: 90 MS
EKG QTC CALCULATION (BAZETT): 461 MS
EKG R AXIS: -9 DEGREES
EKG T AXIS: 54 DEGREES
EKG VENTRICULAR RATE: 86 BPM
EOSINOPHILS RELATIVE PERCENT: 4 % (ref 1–4)
GFR AFRICAN AMERICAN: >60 ML/MIN
GFR NON-AFRICAN AMERICAN: 50 ML/MIN
GFR SERPL CREATININE-BSD FRML MDRD: ABNORMAL ML/MIN/{1.73_M2}
GLUCOSE BLD-MCNC: 103 MG/DL (ref 65–105)
GLUCOSE BLD-MCNC: 127 MG/DL (ref 70–99)
HCT VFR BLD CALC: 27.6 % (ref 36–46)
HEMOGLOBIN: 9.2 G/DL (ref 12–16)
LYMPHOCYTES # BLD: 14 % (ref 24–44)
MAGNESIUM: 1.7 MG/DL (ref 1.6–2.6)
MCH RBC QN AUTO: 30.5 PG (ref 26–34)
MCHC RBC AUTO-ENTMCNC: 33.3 G/DL (ref 31–37)
MCV RBC AUTO: 91.3 FL (ref 80–100)
MONOCYTES # BLD: 6 % (ref 1–7)
MORPHOLOGY: ABNORMAL
PDW BLD-RTO: 16.6 % (ref 12.5–15.4)
PLATELET # BLD: 250 K/UL (ref 140–450)
PMV BLD AUTO: 8.7 FL (ref 6–12)
POTASSIUM SERPL-SCNC: 3.1 MMOL/L (ref 3.7–5.3)
RBC # BLD: 3.02 M/UL (ref 4–5.2)
SEG NEUTROPHILS: 75 % (ref 36–66)
SEGMENTED NEUTROPHILS ABSOLUTE COUNT: 5.7 K/UL (ref 1.8–7.7)
SODIUM BLD-SCNC: 137 MMOL/L (ref 135–144)
WBC # BLD: 7.6 K/UL (ref 3.5–11)

## 2022-07-09 PROCEDURE — 99220 PR INITIAL OBSERVATION CARE/DAY 70 MINUTES: CPT | Performed by: HOSPITALIST

## 2022-07-09 PROCEDURE — 94640 AIRWAY INHALATION TREATMENT: CPT

## 2022-07-09 PROCEDURE — 83735 ASSAY OF MAGNESIUM: CPT

## 2022-07-09 PROCEDURE — 71046 X-RAY EXAM CHEST 2 VIEWS: CPT

## 2022-07-09 PROCEDURE — 97116 GAIT TRAINING THERAPY: CPT

## 2022-07-09 PROCEDURE — 2700000000 HC OXYGEN THERAPY PER DAY

## 2022-07-09 PROCEDURE — 6370000000 HC RX 637 (ALT 250 FOR IP): Performed by: NURSE PRACTITIONER

## 2022-07-09 PROCEDURE — 36415 COLL VENOUS BLD VENIPUNCTURE: CPT

## 2022-07-09 PROCEDURE — 80048 BASIC METABOLIC PNL TOTAL CA: CPT

## 2022-07-09 PROCEDURE — 2580000003 HC RX 258: Performed by: EMERGENCY MEDICINE

## 2022-07-09 PROCEDURE — 96361 HYDRATE IV INFUSION ADD-ON: CPT

## 2022-07-09 PROCEDURE — 97166 OT EVAL MOD COMPLEX 45 MIN: CPT

## 2022-07-09 PROCEDURE — G0378 HOSPITAL OBSERVATION PER HR: HCPCS

## 2022-07-09 PROCEDURE — 82947 ASSAY GLUCOSE BLOOD QUANT: CPT

## 2022-07-09 PROCEDURE — 85025 COMPLETE CBC W/AUTO DIFF WBC: CPT

## 2022-07-09 PROCEDURE — 6370000000 HC RX 637 (ALT 250 FOR IP): Performed by: HOSPITALIST

## 2022-07-09 PROCEDURE — 94660 CPAP INITIATION&MGMT: CPT

## 2022-07-09 PROCEDURE — 2580000003 HC RX 258: Performed by: NURSE PRACTITIONER

## 2022-07-09 PROCEDURE — 97535 SELF CARE MNGMENT TRAINING: CPT

## 2022-07-09 PROCEDURE — 97162 PT EVAL MOD COMPLEX 30 MIN: CPT

## 2022-07-09 RX ORDER — AZITHROMYCIN 250 MG/1
TABLET, FILM COATED ORAL
Qty: 1 PACKET | Refills: 0 | Status: ON HOLD | OUTPATIENT
Start: 2022-07-09 | End: 2022-09-23 | Stop reason: HOSPADM

## 2022-07-09 RX ORDER — MAGNESIUM SULFATE IN WATER 40 MG/ML
2000 INJECTION, SOLUTION INTRAVENOUS PRN
Status: DISCONTINUED | OUTPATIENT
Start: 2022-07-09 | End: 2022-07-09 | Stop reason: HOSPADM

## 2022-07-09 RX ORDER — POTASSIUM CHLORIDE 7.45 MG/ML
10 INJECTION INTRAVENOUS PRN
Status: DISCONTINUED | OUTPATIENT
Start: 2022-07-09 | End: 2022-07-09 | Stop reason: HOSPADM

## 2022-07-09 RX ORDER — POTASSIUM CHLORIDE 20 MEQ/1
40 TABLET, EXTENDED RELEASE ORAL PRN
Status: DISCONTINUED | OUTPATIENT
Start: 2022-07-09 | End: 2022-07-09 | Stop reason: HOSPADM

## 2022-07-09 RX ORDER — CEFUROXIME AXETIL 500 MG/1
500 TABLET ORAL 2 TIMES DAILY
Qty: 14 TABLET | Refills: 0 | Status: SHIPPED | OUTPATIENT
Start: 2022-07-09 | End: 2022-07-16

## 2022-07-09 RX ADMIN — ASPIRIN 81 MG: 81 TABLET, COATED ORAL at 09:14

## 2022-07-09 RX ADMIN — IPRATROPIUM BROMIDE AND ALBUTEROL SULFATE 1 AMPULE: .5; 2.5 SOLUTION RESPIRATORY (INHALATION) at 08:13

## 2022-07-09 RX ADMIN — PREGABALIN 100 MG: 100 CAPSULE ORAL at 09:15

## 2022-07-09 RX ADMIN — ALPRAZOLAM 1 MG: 0.5 TABLET ORAL at 00:21

## 2022-07-09 RX ADMIN — SODIUM CHLORIDE, PRESERVATIVE FREE 3 ML: 5 INJECTION INTRAVENOUS at 00:11

## 2022-07-09 RX ADMIN — ROSUVASTATIN CALCIUM 10 MG: 5 TABLET, COATED ORAL at 00:21

## 2022-07-09 RX ADMIN — BUDESONIDE AND FORMOTEROL FUMARATE DIHYDRATE 2 PUFF: 160; 4.5 AEROSOL RESPIRATORY (INHALATION) at 08:13

## 2022-07-09 RX ADMIN — RIVAROXABAN 20 MG: 10 TABLET, FILM COATED ORAL at 00:21

## 2022-07-09 RX ADMIN — SODIUM CHLORIDE: 9 INJECTION, SOLUTION INTRAVENOUS at 00:04

## 2022-07-09 RX ADMIN — PANTOPRAZOLE SODIUM 40 MG: 40 TABLET, DELAYED RELEASE ORAL at 06:51

## 2022-07-09 RX ADMIN — POTASSIUM CHLORIDE 40 MEQ: 1500 TABLET, EXTENDED RELEASE ORAL at 09:15

## 2022-07-09 RX ADMIN — LEVOTHYROXINE SODIUM 50 MCG: 50 TABLET ORAL at 06:52

## 2022-07-09 RX ADMIN — SODIUM CHLORIDE, PRESERVATIVE FREE 10 ML: 5 INJECTION INTRAVENOUS at 09:15

## 2022-07-09 RX ADMIN — PREGABALIN 100 MG: 100 CAPSULE ORAL at 00:21

## 2022-07-09 NOTE — PROGRESS NOTES
RT in to administer Aerosol and Mdi therapy . Supplemental 02 noted at 2lpm , sat 98% . I did remove the 02 to attempt to wean . Breath sounds diminished .

## 2022-07-09 NOTE — PROGRESS NOTES
Physical Therapy  Facility/Department: [facility] ICU  Physical Therapy Initial Assessment    Name: Colin Vaughan  : 1950  MRN: [id number]  Date of Service: 2022   Chief Complaint   Patient presents with    Dehydration     pt. states her mouth has been dry 2-3 weeks    Altered Mental Status     2-3 weeks    Fatigue       Discharge Recommendations:  Patient would benefit from continued therapy after discharge   PT Equipment Recommendations  Equipment Needed: No  Other: Pt reports having a straight cane and walker at home. Pt recommended to use walker at home upon discharge. Patient Diagnosis(es): The primary encounter diagnosis was Anemia, unspecified type. Diagnoses of Pneumonia due to infectious organism, unspecified laterality, unspecified part of lung, Hyponatremia, Urinary tract infection without hematuria, site unspecified, and Pneumonia of right lower lobe due to infectious organism were also pertinent to this visit. Past Medical History:  has a past medical history of A-fib ([address] Anxiety, Arthritis, Blood transfusion reaction, CAD (coronary artery disease), Carotid stenosis, COPD (chronic obstructive pulmonary disease) ([address] GERD (gastroesophageal reflux disease), Hepatitis B, History of blood transfusion, Hyperlipidemia, Hypertension, Hypothyroid, Kidney disease, Legally blind, Macular edema, PVD (peripheral vascular disease) ([address] Sleep apnea, and Subclavian artery stenosis ([address] Past Surgical History:  has a past surgical history that includes Aorto-femoral Bypass Graft (); tumor removal (Left); Tonsillectomy; Toe amputation (); Cataract removal (Bilateral); Cholecystectomy (2014); Carotid endarterectomy; Cardiac catheterization (10/2012); eye surgery; eye surgery; Carotid endarterectomy (Left, 2019); and Carotid endarterectomy (Left, 2019).     Assessment   Body Structures, Functions, Activity Limitations Requiring Skilled Therapeutic Intervention: Decreased functional mobility ; Decreased endurance;Decreased strength;Decreased safe awareness;Decreased balance  Assessment: Pt exhibits generalized weakness, decreased activity tolerance, and impaired mobility with balance and safety deficits. Pt required Min-CGA for transfers and ambulated 15' with straight cane (reported she uses cane or no device) and 15' with RW with Min-CGA. Pt demonstrates increased unsteadiness with straight cane and advised to use her RW at all times upon discharge. Based on today's mobility, patient would be unsafe to return to previous living arrangement without 24/7 assistance. Pt would also benefit from further therapy upon discharge. Treatment Diagnosis: dec mobility  Therapy Prognosis: Good  Decision Making: Medium Complexity  Requires PT Follow-Up: Yes  Activity Tolerance  Activity Tolerance: Patient limited by fatigue;Patient limited by endurance     Plan   Plan  Plan:  (5-6x/week)  Current Treatment Recommendations: Strengthening,Balance training,Functional mobility training,Transfer training,Gait training,Endurance training,Safety education & training,Patient/Caregiver education & training,Therapeutic activities  Safety Devices  Type of Devices: Bed alarm in place,Call light within reach,Left in bed,Patient at risk for falls,Gait belt  Restraints  Restraints Initially in Place: No     Restrictions  Restrictions/Precautions  Restrictions/Precautions: General Precautions  Required Braces or Orthoses?: No     Subjective   General  Patient assessed for rehabilitation services?: Yes  Family / Caregiver Present: No  Referring Practitioner: Arlette Malagon  Referral Date : 07/09/22  Diagnosis: PNA  Follows Commands: Within Functional Limits  Subjective  Subjective: Pt supine in bed and agreeable to therapy. Pt denies pain.          Social/Functional History  Social/Functional History  Lives With: Other (comment) (grandson)  Type of Home: Mobile home  Home Layout: One level  Home Access: Ramped entrance  Bathroom Shower/Tub: Walk-in shower  Bathroom Toilet: Handicap height  Bathroom Equipment: Shower chair,Grab bars in shower  Home Equipment: Dulcie Sicks, rolling,Cane  Has the patient had two or more falls in the past year or any fall with injury in the past year?: Yes  Receives Help From: Family  ADL Assistance: Independent  Homemaking Assistance: Needs assistance  Ambulation Assistance: Independent (Uses cane \"more and more. \")  Transfer Assistance: Independent  Active : No  Patient's  Info: grandson drives  Mode of Transportation: Car  Vision/Hearing  Vision  Vision: Impaired  Vision Exceptions: Wears glasses at all times  Hearing  Hearing: Within functional limits    Cognition   Orientation  Overall Orientation Status: Within Functional Limits  Cognition  Overall Cognitive Status: WFL     Objective         Gross Assessment  AROM: Within functional limits  PROM: Within functional limits  Strength: Generally decreased, functional  Coordination: Within functional limits  Tone: Normal  Sensation: Intact     AROM RLE (degrees)  RLE AROM: WFL  AROM LLE (degrees)  LLE AROM : WFL  Strength RLE  Strength RLE: Exception  Comment: grossly -4-4/5  Strength LLE  Strength LLE: Exception  Comment: grossly -4-4/5           Bed mobility  Supine to Sit: Minimal assistance  Scooting: Minimal assistance  Bed Mobility Comments: head of bed elevated ~60 degrees, assist to bring trunk upright as patient unable to use (R) UE to assist; pt returned to bed at end of session due to not having much sleep prior night  Transfers  Sit to Stand: Minimal Assistance;Contact guard assistance  Stand to sit: Minimal Assistance;Contact guard assistance  Comment: Transfers with straight cane and RW. Pt required verbal cues for proper hand placement; pt impulsive.   Ambulation  Surface: level tile  Device: Single point cane  Assistance: Minimal assistance  Quality of Gait: slow pace with very short steps, decreased step height and increased base of support  Gait Deviations: Slow Maya;Decreased step length;Decreased step height;Deviated path; Increased ESTHER  Distance: 13' x1  Comments: Pt unsteady with straight cane; pt reported only using straight cane in home occasionally but more often uses furniture and other objects during ambulation. More Ambulation?: Yes  Ambulation 2  Surface - 2: level tile  Device 2: Rolling Walker  Assistance 2: Contact guard assistance;Minimal assistance  Quality of Gait 2: slow pace, decreased step length and height  Gait Deviations: Slow Maya;Decreased step length;Decreased step height  Distance: 15' x 1  Comments: slightly improved gait speed and step length with RW; improved steadiness  Stairs/Curb  Stairs?: No     Balance  Posture: Fair  Sitting - Static: Good  Sitting - Dynamic: Fair;+  Standing - Static: Fair  Standing - Dynamic: Fair;-  Comments: balance assessed with straight cane and RW; pt with improved balance with RW and advised to use at all times upon return home           OutComes Score                                                  AM-PAC Score     AM-PAC Inpatient Mobility without Stair Climbing Raw Score : 16 (07/09/22 1215)  AM-PAC Inpatient without Stair Climbing T-Scale Score : 45.54 (07/09/22 1215)  Mobility Inpatient CMS 0-100% Score: 40.64 (07/09/22 1215)  Mobility Inpatient without Stair CMS G-Code Modifier : CK (07/09/22 1215)       Goals  Short Term Goals  Time Frame for Short term goals: 14 visits  Short term goal 1: Pt to be Independent with bed mobility. Short term goal 2: Pt to perform (B) LE PRE's x 20 reps to improve strength for mobility. Short term goal 3: Pt to transfer Modified (I) with appropriate device without LOB. Short term goal 4: Pt to ambulate with least restrictive and safest device 150' x 1 Modified (I) without LOB.   Additional Goals?: No  Patient Goals   Patient goals : None stated       Education  Patient Education  Education Given To: Patient  Education Provided: Role of Therapy;Plan of Care;Transfer Training  Education Provided Comments: Use of AD for mobility, safety awareness  Education Method: Demonstration;Verbal  Barriers to Learning: None  Education Outcome: Continued education needed      Therapy Time   Individual Concurrent Group Co-treatment   Time In 2737         Time Out 0915         Minutes 28         Timed Code Treatment Minutes: Cristina 224, PT

## 2022-07-09 NOTE — DISCHARGE SUMMARY
[facility]  Office: [address], DO, Susana Din, DO, Gerson Acron, DO, Eros Miranda Blood, DO, Lucita Mcintosh MD, Tayo Levy MD, Stacie Perdomo MD, Alis Love MD, Lucie Crisostomo MD, Sekou Sommers MD, Jose Mcintyre MD, Darin Reagan, DO, Tamar Samuels MD,  Karri Meza, DO, Billie Dorsey MD, Abdiel Mock MD, Kaylynn Anderson, DO, Eder Belle MD, Elana Blevins MD, Florentin Howe, DO, Todd Chandler MD, Duncan Walsh MD, Leia Delatorre, [facility], [facility], CNP, Rehan Salmeron, CNP, Adelia Alpers, [facility], Lou Dykes, CNP, Sandy Ortega, CNP, ОЛЬГА HartmannC, Yuliet Moscoso, [facility], Fernando Torrez, [facility], Candace Schafer, CNP, Larisa Mir, CNP, Sunshine Bonner, CNS, [place] Marin, [facility], Aidan Hernandez, CNP, Payton Kohli, CNP, [facility]    Discharge Summary     Patient ID: Celestino Mark  :  1950   MRN: [id number]     ACCOUNT:  [de-identified]   Patient's PCP: Dev Hernandez MD  Admit Date: 2022   Discharge Date: 2022     Length of Stay: 0  Code Status:  Full Code  Admitting Physician: Yessi Daniels DO  Discharge Physician: Yessi Daniels DO     Active Discharge Diagnoses:     Hospital Problem Lists:  Principal Problem:    PNA (pneumonia)  Resolved Problems:    * No resolved hospital problems. *      Admission Condition: Fair     Discharged Condition: good    Hospital Stay:     Hospital Course:  Celestino Mark is a 73 y.o. female who was admitted for the management of  PNA (pneumonia) , presented to ER with Dehydration (pt. states her mouth has been dry 2-3 weeks), Altered Mental Status (2-3 weeks), and Fatigue    This very pleasant 72-year-old female presented to the hospital with fatigue, cough, confusion. She was found to be intravascularly depleted and imaging study showed a right lower lobe pneumonia. The patient was admitted and hydrated.   She underwent initiation of antibiotics. The patient improved dramatically and ultimately was transitioned over to oral antibiotics with disposition home. Patient is discharged in stable condition. Significant therapeutic interventions: As above    Significant Diagnostic Studies:   Labs / Micro:  CBC:   Lab Results   Component Value Date/Time    WBC 7.6 07/09/2022 06:03 AM    RBC 3.02 07/09/2022 06:03 AM    HGB 9.2 07/09/2022 06:03 AM    HCT 27.6 07/09/2022 06:03 AM    MCV 91.3 07/09/2022 06:03 AM    MCH 30.5 07/09/2022 06:03 AM    MCHC 33.3 07/09/2022 06:03 AM    RDW 16.6 07/09/2022 06:03 AM     07/09/2022 06:03 AM     BMP:    Lab Results   Component Value Date/Time    GLUCOSE 127 07/09/2022 06:03 AM     07/09/2022 06:03 AM    K 3.1 07/09/2022 06:03 AM     07/09/2022 06:03 AM    CO2 26 07/09/2022 06:03 AM    ANIONGAP 11 07/09/2022 06:03 AM    BUN 13 07/09/2022 06:03 AM    CREATININE 1.08 07/09/2022 06:03 AM    BUNCRER NOT REPORTED 10/06/2021 11:07 AM    CALCIUM 8.5 07/09/2022 06:03 AM    LABGLOM 50 07/09/2022 06:03 AM    GFRAA >60 07/09/2022 06:03 AM    GFR      07/09/2022 06:03 AM        Radiology:  XR CHEST PORTABLE    Result Date: 7/8/2022  Mild cardiomegaly. Focal opacity at the right base which may be related to focal airspace disease. Consultations:    Consults:     Final Specialist Recommendations/Findings:   IP CONSULT TO RESPIRATORY CARE  IP CONSULT TO SOCIAL WORK      The patient was seen and examined on day of discharge and this discharge summary is in conjunction with any daily progress note from day of discharge.     Discharge plan:     Disposition: Home    Physician Follow Up:     Dawn Reyez MD  [address]  [id number]  [place] [id number]  [phone]             Requiring Further Evaluation/Follow Up POST HOSPITALIZATION/Incidental Findings: None    Diet: regular diet    Activity: As tolerated    Instructions to Patient: None    Discharge Medications:      Medication List START taking these medications    azithromycin 250 MG tablet  Commonly known as: Zithromax  Take 2 tabs (500 mg) on Day 1, and take 1 tab (250 mg) on days 2 through 5.     cefUROXime 500 MG tablet  Commonly known as: CEFTIN  Take 1 tablet by mouth 2 times daily for 7 days        CONTINUE taking these medications    albuterol sulfate  (90 Base) MCG/ACT inhaler  Commonly known as: PROVENTIL;VENTOLIN;PROAIR     alendronate 35 MG tablet  Commonly known as: FOSAMAX     ALPRAZolam 1 MG tablet  Commonly known as: XANAX     amLODIPine 5 MG tablet  Commonly known as: NORVASC     aspirin 81 MG EC tablet     calcium carbonate 1500 (600 Ca) MG Tabs tablet     cyanocobalamin 1000 MCG/ML injection     Daliresp 500 MCG tablet  Generic drug: Roflumilast     fexofenadine 180 MG tablet  Commonly known as: ALLEGRA     folic acid 1 MG tablet  Commonly known as: FOLVITE     furosemide 40 MG tablet  Commonly known as: LASIX     levothyroxine 50 MCG tablet  Commonly known as: SYNTHROID     losartan 100 MG tablet  Commonly known as: COZAAR     omeprazole 40 MG delayed release capsule  Commonly known as: PRILOSEC     pregabalin 100 MG capsule  Commonly known as: LYRICA     Symbicort 160-4.5 MCG/ACT Aero  Generic drug: budesonide-formoterol     varenicline 1 MG tablet  Commonly known as: CHANTIX     vitamin D 50 MCG (2000 UT) Tabs tablet  Commonly known as: CHOLECALCIFEROL        STOP taking these medications    benztropine 0.5 MG tablet  Commonly known as: COGENTIN     Sheyztri Aerosphere 160-9-4.8 MCG/ACT Aero  Generic drug: Budeson-Glycopyrrol-Formoterol     fluticasone 50 MCG/ACT nasal spray  Commonly known as: FLONASE     fluticasone-salmeterol 250-50 MCG/DOSE Aepb  Commonly known as: ADVAIR     Fluzone High-Dose Quadrivalent 0.7 ML Candace injection  Generic drug: Influenza Vac High-Dose Quad     Kenalog 10 MG/ML injection  Generic drug: triamcinolone acetonide     lidocaine 1 % injection     meloxicam 7.5 MG tablet  Commonly known as: MOBIC     rivaroxaban 20 MG Tabs tablet  Commonly known as: XARELTO     rosuvastatin 10 MG tablet  Commonly known as: CRESTOR           Where to Get Your Medications      These medications were sent to [address] [place] [address], [address] [place] [address] [address] [address]    Phone: [phone]   · azithromycin 250 MG tablet  · cefUROXime 500 MG tablet         No discharge procedures on file. Time Spent on discharge is  20 mins in patient examination, evaluation, counseling as well as medication reconciliation, prescriptions for required medications, discharge plan and follow up. Electronically signed by   Mckenna Michelle DO  7/9/2022  10:21 AM      Thank you Dr. Antoine Pardo MD for the opportunity to be involved in this patient's care.

## 2022-07-09 NOTE — PROGRESS NOTES
Occupational Therapy  Facility/Department: [facility] Kiarra Otero [facility]  Occupational Therapy Initial Assessment      Name: Devorah Thomas  : 1950  MRN: [id number]  Date of Service: 2022    Chief Complaint   Patient presents with    Dehydration     pt. states her mouth has been dry 2-3 weeks    Altered Mental Status     2-3 weeks    Fatigue     Discharge Recommendations:  Patient would benefit from continued therapy after discharge  OT Equipment Recommendations  Equipment Needed: No     Patient Diagnosis(es): The primary encounter diagnosis was Anemia, unspecified type. Diagnoses of Pneumonia due to infectious organism, unspecified laterality, unspecified part of lung, Hyponatremia, Urinary tract infection without hematuria, site unspecified, and Pneumonia of right lower lobe due to infectious organism were also pertinent to this visit. Past Medical History:  has a past medical history of A-fib ([address] Anxiety, Arthritis, Blood transfusion reaction, CAD (coronary artery disease), Carotid stenosis, COPD (chronic obstructive pulmonary disease) ([address] GERD (gastroesophageal reflux disease), Hepatitis B, History of blood transfusion, Hyperlipidemia, Hypertension, Hypothyroid, Kidney disease, Legally blind, Macular edema, PVD (peripheral vascular disease) ([address] Sleep apnea, and Subclavian artery stenosis ([address] Past Surgical History:  has a past surgical history that includes Aorto-femoral Bypass Graft (); tumor removal (Left); Tonsillectomy; Toe amputation (); Cataract removal (Bilateral); Cholecystectomy (2014); Carotid endarterectomy; Cardiac catheterization (10/2012); eye surgery; eye surgery; Carotid endarterectomy (Left, 2019); and Carotid endarterectomy (Left, 2019). Assessment   Performance deficits / Impairments: Decreased functional mobility ; Decreased endurance;Decreased coordination;Decreased ADL status; Decreased balance;Decreased safe awareness;Decreased cognition;Decreased fine motor control  Assessment: Pt is very motivated to regain functional independence with daily tasks and overall endurance. Pt currently has deficits / impairments which impact her ability to return to prior level of functioning; will benefit from continued participation in OT services to improve independent skills / functions. Prognosis: Fair  Decision Making: Medium Complexity  REQUIRES OT FOLLOW-UP: Yes  Activity Tolerance  Activity Tolerance: Patient Tolerated treatment well;Patient limited by fatigue        Plan   Plan  Times per Week: 5-6x/week  Times per Day: Daily  Current Treatment Recommendations: Endurance training,Functional mobility training,Balance training,Safety education & training,Patient/Caregiver education & training,Equipment evaluation, education, & procurement,Cognitive/Perceptual training,Self-Care / ADL     Restrictions  Restrictions/Precautions  Restrictions/Precautions: General Precautions  Required Braces or Orthoses?: No  Position Activity Restriction  Other position/activity restrictions: Up c Assist    Subjective   General  Patient assessed for rehabilitation services?: Yes  Family / Caregiver Present: No  Diagnosis: PNA  Subjective  Subjective: RN approved Pt to be seen for OT evaluation. General Comment  Comments: Pt was agreeable / cooperative. Reported being fatigued.      Social/Functional History  Social/Functional History  Lives With: Other (comment) (With grandson (he is home 24 hrs / day))  Type of Home: Mobile home  Home Layout: One level  Home Access: Ramped entrance  Bathroom Shower/Tub: Walk-in shower  Bathroom Toilet: Handicap height  Bathroom Equipment: Shower chair,Grab bars in [address]: Kenn[address]ena, rolling,Cane  Has the patient had two or more falls in the past year or any fall with injury in the past year?: Yes  Receives Help From: Family  ADL Assistance: Independent (Pt reports she is Independent / Mod I for all ADLs)  Homemaking Assistance: Needs assistance  Ambulation Assistance: Independent (Uses cane \"more and more. \")  Transfer Assistance: Independent  Active : No  Patient's  Info: grandson drives  Mode of Transportation: Car     Objective   Safety Devices  Type of Devices: Bed alarm in place;Call light within reach; Left in bed;Patient at risk for falls;Gait belt  Restraints  Restraints Initially in Place: No    Bed Mobility Training  Bed Mobility Training: Yes  Overall Level of Assistance: Adaptive equipment; Additional time;Minimum assistance [facility], use of handrail (unable to use RUE d/t recent injury, cues / assist to integrate Left UE / hand on handrail))  Interventions: Verbal cues; Tactile cues (Mod Cues task initiation & sequencing / accurate use of DME / integration of Left (dominant) UE (d/t recent RUE injury))  Supine to Sit: Minimum assistance; Adaptive equipment; Additional time  Sit to Supine: Minimum assistance; Additional time; Adaptive equipment    Balance  Sitting: Without support (Dynamic Sitting at EOB (overall several trials; 8-10 mins total; SBA) with Fair balance)  Standing: With support (Dynamic Standing Tolerance (~4-5 mins total, CGA, using SPC and RW, during transitional movements / functional tasks) with Fair- balance)  Transfer Training  Transfer Training: Yes  Overall Level of Assistance: Contact-guard assistance; Adaptive equipment; Additional time (Using SPC (CGA, decreased balance)  //  Using RW (CGA, improved balance / safety))  Interventions: Verbal cues; Tactile cues (Mod Cues task initiation & sequencing / accurate use of DME)  Sit to Stand: Additional time; Adaptive equipment;Contact-guard assistance (Use of RW)  Stand to Sit: Contact-guard assistance; Additional time; Adaptive equipment (Use of RW)  Toilet Transfer:  (Pt declined Toilet Transfer this date (d/t fatigue / lethargy))    Gait  Overall Level of Assistance: Contact-guard assistance; Additional time; Adaptive equipment (Functional Mobility for in-room mobility / obstacle negotiation. Trialed mobility with SPC (with CGA, decreased balance). Trial with RW (with CGA, improved balance / posture). )  Interventions:  (Mod Cues task initiation & sequencing / accurate use of DME)     ADL  Feeding: Independent  Grooming: Contact guard assistance; Increased time to complete; Adaptive equipment  Grooming Skilled Clinical Factors: While in standing (with SPC // with RW) at the sink. Mod Cues for safe / accurate placement of DME. UE Dressing: Increased time to complete;Verbal cueing;Minimal assistance  UE Dressing Skilled Clinical Factors: Seated EOB (SBA) in order to doff / don gown like robe [employer], including management of medical lines). LE Dressing: Moderate assistance; Increased time to complete;Verbal cueing  LE Dressing Skilled Clinical Factors: While sitting EOB (SBA). Max cues / demo for alternative techniques to doff / don socks (initially attempted very unsafe techniques); completed with Mod Assist.  Mod Assist for simulated pants / brief mgmt. Toileting Skilled Clinical Factors: Declined toilet t/f and toileting. Activity Tolerance  Activity Tolerance: Patient limited by fatigue;Patient limited by endurance     Cognition  Overall Cognitive Status: WFL  Orientation  Overall Orientation Status: Within Functional Limits     LUE AROM (degrees)  LUE AROM : WFL  LUE General AROM: Hx decreased / impaired AROM Bilateral Shoulders. Recent Right Shoulder injury reported. AROM Bilateral > Distal WFL. Left Hand AROM (degrees)  Left Hand AROM: WFL  RUE AROM (degrees)  RUE AROM : WFL  RUE General AROM: Hx decreased / impaired AROM Bilateral Shoulders. AROM Bilateral > Distal WFL.   Right Hand AROM (degrees)  Right Hand AROM: [facility]    [facility] Score  [facility] Inpatient Daily Activity Raw Score: 15 (07/09/22 1431)  AM-PAC Inpatient ADL T-Scale Score : 34.69 (07/09/22 1431)  ADL Inpatient CMS 0-100% Score: 56.46 (07/09/22 1431)  ADL Inpatient CMS G-Code Modifier : CK (07/09/22 5283)    Goals  Short Term Goals  Time Frame for Short term goals: Within 14 treatment sessions  Short Term Goal 1: Pt will complete UB ADLs with Mod I with Good Integration of EC / Ax pacing. Short Term Goal 2: Pt will participate in LB ADLs with Supervision Assist with Good Integration AE / DME. Short Term Goal 3: Pt will participate in Bathroom & ADL Transfers with Supervision Assist without LOB. Short Term Goal 4: Pt will complete Functional Mobility (in-room / obstacle negotiation) with Supervision Assist while maintaining Fair Balance.        Therapy Time   Individual Concurrent Group Co-treatment   Time In 0848         Time Out 0915         Minutes 27         Timed Code Treatment Minutes: 8 Minutes (ADL)       MYNOR Kim, OTR/L

## 2022-07-09 NOTE — PLAN OF CARE
Problem: Discharge Planning  Goal: Discharge to home or other facility with appropriate resources  Outcome: Completed  Flowsheets  Taken 7/9/2022 0800 by Steven Calix RN  Discharge to home or other facility with appropriate resources: Identify barriers to discharge with patient and caregiver  Taken 7/8/2022 2339 by Gareth Sapp RN  Discharge to home or other facility with appropriate resources:   Identify barriers to discharge with patient and caregiver   Identify discharge learning needs (meds, wound care, etc)   Refer to discharge planning if patient needs post-hospital services based on physician order or complex needs related to functional status, cognitive ability or social support system   Arrange for needed discharge resources and transportation as appropriate   Patients questions and concerns addressed and answered. Problem: Safety - Adult  Goal: Free from fall injury  Outcome: Completed  Flowsheets (Taken 7/9/2022 1018)  Free From Fall Injury: Instruct family/caregiver on patient safety   Fall assessment preformed. Bed in low locked position with call light and tray table within reach. Education given. Will continue to monitor. Problem: ABCDS Injury Assessment  Goal: Absence of physical injury  Outcome: Completed  Flowsheets (Taken 7/9/2022 1018)  Absence of Physical Injury: Implement safety measures based on patient assessment  Fall assessment preformed. Bed in low locked position with call light and tray table within reach. Education given. Will continue to monitor. Problem: Skin/Tissue Integrity  Goal: Absence of new skin breakdown  Description: 1. Monitor for areas of redness and/or skin breakdown  2. Assess vascular access sites hourly  3. Every 4-6 hours minimum:  Change oxygen saturation probe site  4. Every 4-6 hours:  If on nasal continuous positive airway pressure, respiratory therapy assess nares and determine need for appliance change or resting period.   Outcome: Completed   Skin assessment preformed. Pt encouraged to turn every 2 hours with heals elevated off bed. Will continue to monitor.

## 2022-07-09 NOTE — H&P
[facility]  Office: [address], DO, Sandra Prasad, DO, January Harvey, DO, Kwesi Vasques, DO, Carlos Morel MD, Denice Phipps MD, Kennis Osler, MD, Naomi Leon MD, Nakia Lynn MD, Jamil Capellan MD, Arlin Patiño MD, Chon Ernst, DO, Rochelle Pop MD,  Frank Desir MD, Meka Nava MD, Shahrzad Joyner DO, Pietro Miranda MD, Mitch Kelley MD, Janeth Rick DO, Alexia Garza MD, Abdiel Noe MD, Jennifer Gonzalze [facility], [facility], CNP, Jose Mcneal, CNP, Manuel Davis, CNP, Nicola Olea, CNP, Enrique Trinidad, CNP, Nuha Carlin PA-C, Leighton Fung, [facility], Davon Cunha, CNP, Zora Lock, CNP, Dorita Casanova, CNP, Balbina Salgado, CNS, Yecenia Christopher, [facility], Néstor Garza, CNP, Kenna Savage, CNP, Nanette Martinez, [facility]         [facility]    HISTORY AND PHYSICAL EXAMINATION            Date:   7/9/2022  Patient name:  Shawn Singh  Date of admission:  7/8/2022  6:43 PM  MRN:   [id number]  Account:  [de-identified]  YOB: 1950  PCP:    Porter Castañeda MD  Room:   [address] [place] [address]  Code Status:    Full Code    Chief Complaint:     Chief Complaint   Patient presents with    Dehydration     pt. states her mouth has been dry 2-3 weeks    Altered Mental Status     2-3 weeks    Fatigue     Patient presents to the hospital with fatigue, patient states \"I feel better\"    History Obtained From:     patient, electronic medical record    History of Present Illness:     Shawn Singh is a 73 y.o. Non- / non  female who presents with Dehydration (pt. states her mouth has been dry 2-3 weeks), Altered Mental Status (2-3 weeks), and Fatigue   and is admitted to the hospital for the management of PNA (pneumonia). This very pleasant 79-year-old female came to the hospital with nonspecific complaints of feeling dry along with confusion. She complains of fatigue.   On admission she was found to have a mild acute kidney injury in addition to what appears to be a right lower lobe pneumonia. She has had a little bit of a cough but denies any sputum production. The patient has been admitted for IV hydration along with antibiotics. She was minimally hypoxic on admission and has been weaned off of oxygen. She does have a history of smoking and unfortunately continues to smoke approximately 1 pack daily. At this point in time she tells me that she is feeling significantly better. She is interested in transitioning to oral antibiotics with disposition home.     Past Medical History:     Past Medical History:   Diagnosis Date    A-fib ([address]     Anxiety     Arthritis     Blood transfusion reaction     2001 with surgery    CAD (coronary artery disease)     Carotid stenosis     COPD (chronic obstructive pulmonary disease) (HCC)     GERD (gastroesophageal reflux disease)     Hepatitis B 1972    History of blood transfusion 2001    Hyperlipidemia     Hypertension     Hypothyroid     Kidney disease     GFR 60, with mass lower left chamber and right upper   Stage 3    Legally blind     patient gets eye appointment    Macular edema 2013    eye injections    PVD (peripheral vascular disease) ([address]     Sleep apnea     CPAP    Subclavian artery stenosis ([address]         Past Surgical History:     Past Surgical History:   Procedure Laterality Date    AORTO-FEMORAL BYPASS GRAFT  2001    CARDIAC CATHETERIZATION  10/2012    no stents    CAROTID ENDARTERECTOMY      CAROTID ENDARTERECTOMY Left 05/16/2019    CAROTID ENDARTERECTOMY LEFT (Left Neck)    CAROTID ENDARTERECTOMY Left 5/16/2019    CAROTID ENDARTERECTOMY LEFT performed by Derrick Ochoa MD at [address] [place] [address] Bilateral     CHOLECYSTECTOMY  05/05/2014    laparscopic    EYE SURGERY      LASIK LEFT EYE    EYE SURGERY      catarat extraction    TOE AMPUTATION  2001    gangrene 2001    TONSILLECTOMY      TUMOR REMOVAL Left     carotid        Medications Prior to Admission:     Prior to Admission medications    Medication Sig Start Date End Date Taking? Authorizing Provider   azithromycin (ZITHROMAX) 250 MG tablet Take 2 tabs (500 mg) on Day 1, and take 1 tab (250 mg) on days 2 through 5. 7/9/22  Yes Shaan Buck, DO   cefUROXime (CEFTIN) 500 MG tablet Take 1 tablet by mouth 2 times daily for 7 days 7/9/22 7/16/22 Yes Berhane Ball, DO   Roflumilast (DALIRESP) 500 MCG tablet Take 500 mcg by mouth daily   Yes Historical Provider, MD   amLODIPine (NORVASC) 5 MG tablet Take 5 mg by mouth daily   Yes Historical Provider, MD   [facility] 160-4.5 MCG/ACT AERO  9/23/21   Historical Provider, MD   calcium carbonate 1500 (600 Ca) MG TABS tablet  9/20/21   Historical Provider, MD   furosemide (LASIX) 40 MG tablet  9/21/21   Historical Provider, MD   pregabalin (LYRICA) 100 MG capsule Take 100 mg by mouth 2 times daily. Historical Provider, MD   alendronate (FOSAMAX) 35 MG tablet Take 35 mg by mouth every 7 days    Historical Provider, MD   aspirin 81 MG EC tablet Take 81 mg by mouth daily    Historical Provider, MD   varenicline (CHANTIX) 1 MG tablet Take 1 mg by mouth 2 times daily    Historical Provider, MD   fexofenadine (ALLEGRA) 180 MG tablet Take 180 mg by mouth daily    Historical Provider, MD   losartan (COZAAR) 100 MG tablet Take 100 mg by mouth daily. Historical Provider, MD   levothyroxine (SYNTHROID) 50 MCG tablet Take 50 mcg by mouth Daily. Historical Provider, MD   omeprazole (PRILOSEC) 40 MG delayed release capsule Take 40 mg by mouth daily. Historical Provider, MD   albuterol (PROVENTIL HFA;VENTOLIN HFA) 108 (90 BASE) MCG/ACT inhaler Inhale 2 puffs into the lungs every 4 hours as needed for Wheezing. Historical Provider, MD   ALPRAZolam Marcellina Bullion) 1 MG tablet Take 1 mg by mouth 3 times daily as needed for Sleep or Anxiety. Tatiana Sommers     Historical Provider, MD   cyanocobalamin 1000 MCG/ML injection Inject 1,000 mcg into the muscle once. Patient takes monthly    Historical Provider, MD   folic acid (FOLVITE) 1 MG tablet Take 1 mg by mouth daily. Historical Provider, MD   Cholecalciferol (VITAMIN D) 2000 UNITS TABS Take  by mouth. Historical Provider, MD        Allergies: Iv contrast [iodides]    Social History:     Tobacco:    reports that she has been smoking. She has been smoking about 1.00 pack per day. She has quit using smokeless tobacco.  Alcohol:      reports no history of alcohol use. Drug Use:  reports no history of drug use. Family History:     Family History   Problem Relation Age of Onset    Heart Attack Father     Stroke Father     High Blood Pressure Father     Heart Disease Father     Cancer Mother         Multiple myeloma    Cancer Sister         Lung with mets to brain    Cancer Maternal Aunt         Several aunts had unknown cancer    Heart Attack Brother     Liver Disease Brother     Heart Defect Sister        Review of Systems:     Positive and Negative as described in HPI.     CONSTITUTIONAL: Positive for weakness, fatigue  HEENT:  negative for vision, hearing changes, runny nose, throat pain  RESPIRATORY: Positive for cough without productive sputum  CARDIOVASCULAR:  negative for chest pain, palpitations  GASTROINTESTINAL:  negative for nausea, vomiting, diarrhea, constipation, change in bowel habits, abdominal pain   GENITOURINARY:  negative for difficulty of urination, burning with urination, frequency   INTEGUMENT:  negative for rash, skin lesions, easy bruising   HEMATOLOGIC/LYMPHATIC:  negative for swelling/edema   ALLERGIC/IMMUNOLOGIC:  negative for urticaria , itching  ENDOCRINE:  negative increase in drinking, increase in urination, hot or cold intolerance  MUSCULOSKELETAL:  negative joint pains, muscle aches, swelling of joints  NEUROLOGICAL:  negative for headaches, dizziness, lightheadedness, numbness, pain, tingling extremities  BEHAVIOR/PSYCH:  negative for depression, anxiety    Physical Exam:   BP (!) 108/51   Pulse 82   Temp 97.5 °F (36.4 °C) (Oral)   Resp 18   Ht 5' 3\" (1.6 m)   Wt 177 lb 14.6 oz (80.7 kg)   SpO2 98%   BMI 31.52 kg/m²   Temp (24hrs), Av.7 °F (36.5 °C), Min:97.3 °F (36.3 °C), Max:98.2 °F (36.8 °C)    Recent Labs     22  0723   POCGLU 87 103       Intake/Output Summary (Last 24 hours) at 2022 1012  Last data filed at [address]  Gross per 24 hour   Intake 50 ml   Output --   Net 50 ml       General Appearance:  alert, well appearing, and in no acute distress  Mental status: oriented to person, place, and time  Head:  normocephalic, atraumatic  Eye: no icterus, redness, pupils equal and reactive, extraocular eye movements intact, conjunctiva clear  Ear: normal external ear, no discharge, hearing intact  Nose:  no drainage noted  Mouth: mucous membranes moist  Neck: supple, no carotid bruits, thyroid not palpable  Lungs: Bilateral equal air entry, clear to ausculation, no wheezing, rales or rhonchi, normal effort  Cardiovascular: normal rate, regular rhythm, no murmur, gallop, rub  Abdomen: Soft, nontender, nondistended, normal bowel sounds, no hepatomegaly or splenomegaly  Neurologic: There are no new focal motor or sensory deficits, normal muscle tone and bulk, no abnormal sensation, normal speech, cranial nerves II through XII grossly intact  Skin: No gross lesions, rashes, bruising or bleeding on exposed skin area  Extremities:  peripheral pulses palpable, no pedal edema or calf pain with palpation  Psych: normal affect     Investigations:      Laboratory Testing:  Recent Results (from the past 24 hour(s))   EKG 12 Lead    Collection Time: 22  7:26 PM   Result Value Ref Range    Ventricular Rate 86 BPM    Atrial Rate 72 BPM    QRS Duration 90 ms    Q-T Interval 386 ms    QTc Calculation (Bazett) 461 ms    R Axis -9 degrees    T Axis 54 degrees   Urinalysis with Reflex to Culture    Collection Time: 07/08/22  7:40 PM    Specimen: Urine   Result Value Ref Range    Color, UA Yellow Yellow    Turbidity UA SLIGHTLY CLOUDY (A) Clear    Glucose, Ur NEGATIVE NEGATIVE    Bilirubin Urine NEGATIVE NEGATIVE    Ketones, Urine NEGATIVE NEGATIVE    Specific Gravity, UA 1.009 1.005 - 1.030    Urine Hgb NEGATIVE NEGATIVE    pH, UA 6.0 5.0 - 8.0    Protein, UA NEGATIVE NEGATIVE    Urobilinogen, Urine Normal Normal    Nitrite, Urine NEGATIVE NEGATIVE    Leukocyte Esterase, Urine SMALL (A) NEGATIVE   Microscopic Urinalysis    Collection Time: 07/08/22  7:40 PM   Result Value Ref Range    -          WBC, UA 20 TO 50 0 - 5 /HPF    RBC, UA 2 TO 5 0 - 2 /HPF    Epithelial Cells UA 10 TO 20 0 - 5 /HPF    Bacteria, UA MANY (A) None    Other Observations UA Culture ordered based on defined criteria. (A) NOT REQ.    POC Glucose Fingerstick    Collection Time: 07/08/22  7:55 PM   Result Value Ref Range    POC Glucose 87 65 - 105 mg/dL   CBC with Auto Differential    Collection Time: 07/08/22  8:10 PM   Result Value Ref Range    WBC 12.1 (H) 3.5 - 11.0 k/uL    RBC 3.15 (L) 4.0 - 5.2 m/uL    Hemoglobin 9.7 (L) 12.0 - 16.0 g/dL    Hematocrit 30.0 (L) 36 - 46 %    MCV 95.2 80 - 100 fL    MCH 30.8 26 - 34 pg    MCHC 32.3 31 - 37 g/dL    RDW 16.8 (H) 12.5 - 15.4 %    Platelets 618 989 - 879 k/uL    MPV 10.9 8.0 - 14.0 fL    Seg Neutrophils 63 36 - 66 %    Lymphocytes 27 24 - 44 %    Monocytes 8 2 - 11 %    Eosinophils % 2 1 - 4 %    Basophils 0 0 - 2 %    Segs Absolute 7.70 1.8 - 7.7 k/uL    Absolute Lymph # 3.23 1.0 - 4.8 k/uL    Absolute Mono # 0.91 0.1 - 1.2 k/uL    Absolute Eos # 0.26 0.0 - 0.4 k/uL    Basophils Absolute 0.03 0.0 - 0.2 k/uL   CMP    Collection Time: 07/08/22  8:10 PM   Result Value Ref Range    Glucose 87 70 - 99 mg/dL    BUN 17 8 - 23 mg/dL    CREATININE 1.37 (H) 0.50 - 0.90 mg/dL    Calcium 9.3 8.6 - 10.4 mg/dL    Sodium 130 (L) 135 - 144 mmol/L    Potassium 3.6 (L) 3.7 - 5.3 mmol/L    Chloride 90 (L) 98 - 107 mmol/L CO2 25 20 - 31 mmol/L    Anion Gap 15 9 - 17 mmol/L    Alkaline Phosphatase 163 (H) 35 - 104 U/L    ALT 11 5 - 33 U/L    AST 14 <32 U/L    Total Bilirubin 0.40 0.3 - 1.2 mg/dL    Total Protein 6.5 6.4 - 8.3 g/dL    Albumin 3.3 (L) 3.5 - 5.2 g/dL    Albumin/Globulin Ratio 1.0 1.0 - 2.5    GFR Non- 38 (L) >60 mL/min    GFR  46 (L) >60 mL/min    GFR Comment         TSH with Reflex    Collection Time: 07/08/22  8:10 PM   Result Value Ref Range    TSH 1.47 0.30 - 5.00 uIU/mL   Lactic Acid Now and in 2 Hours    Collection Time: 07/08/22  8:10 PM   Result Value Ref Range    Lactic Acid 2.3 (H) 0.5 - 2.2 mmol/L   Ammonia (select if history of liver disease or alcohol abuse)    Collection Time: 07/08/22  8:10 PM   Result Value Ref Range    Ammonia <10 (L) 11 - 51 umol/L   Lactic Acid Now and in 2 Hours    Collection Time: 07/08/22 10:17 PM   Result Value Ref Range    Lactic Acid 1.0 0.5 - 2.2 mmol/L   Basic Metabolic Panel w/ Reflex to MG    Collection Time: 07/09/22  6:03 AM   Result Value Ref Range    Glucose 127 (H) 70 - 99 mg/dL    BUN 13 8 - 23 mg/dL    CREATININE 1.08 (H) 0.50 - 0.90 mg/dL    Calcium 8.5 (L) 8.6 - 10.4 mg/dL    Sodium 137 135 - 144 mmol/L    Potassium 3.1 (L) 3.7 - 5.3 mmol/L    Chloride 100 98 - 107 mmol/L    CO2 26 20 - 31 mmol/L    Anion Gap 11 9 - 17 mmol/L    GFR Non-African American 50 (L) >60 mL/min    GFR African American >60 >60 mL/min    GFR Comment         CBC with Auto Differential    Collection Time: 07/09/22  6:03 AM   Result Value Ref Range    WBC 7.6 3.5 - 11.0 k/uL    RBC 3.02 (L) 4.0 - 5.2 m/uL    Hemoglobin 9.2 (L) 12.0 - 16.0 g/dL    Hematocrit 27.6 (L) 36 - 46 %    MCV 91.3 80 - 100 fL    MCH 30.5 26 - 34 pg    MCHC 33.3 31 - 37 g/dL    RDW 16.6 (H) 12.5 - 15.4 %    Platelets 363 807 - 606 k/uL    MPV 8.7 6.0 - 12.0 fL    Seg Neutrophils 75 (H) 36 - 66 %    Lymphocytes 14 (L) 24 - 44 %    Monocytes 6 1 - 7 %    Eosinophils % 4 1 - 4 %    Basophils 1

## 2022-07-09 NOTE — ED NOTES
Perfect Serve Text to ANGEL LUIS Aparicio   Patient:   Mary Stiles    YOB: 1950  MRN:   [id number]  Location: [place] [address]       7/8/22 9:06 PM  [address] or Facility: [facility] Emergency Department [place] NEW ADMISSION From: Dr Adarsh Winkler: Michael Thompson: [id number] Admission to [address] [place] [address], RN  07/08/22 9639

## 2022-07-09 NOTE — ED NOTES
Report given to [facility] NURSING [facility]      Ines Mederos RN  07/08/22 7759

## 2022-07-09 NOTE — CARE COORDINATION
Patient states she wears CPAP of 10 cm at home at . Her home CPAP device is unable to be brought to the hospital tonight. Patient is willing to trial our CPAP device.      Tito Johnson MBA, RRT

## 2022-07-09 NOTE — CARE COORDINATION
Patient states she is a current active smoker at about 1/2 pack per day. Patient states she takes Symbicort MDI once per day and does not have home oxygen. She denies taking any other respiratory medication. Patient was resting in bed on nasal oxygen at 2.0 lpm and was able to speak in full sentences. Patient already has DUONEB treatments ordered with  SYMBICORT MDI which will start after 0800 hours.     Nica Chaudhari RRT

## 2022-07-10 LAB
CULTURE: ABNORMAL
SPECIMEN DESCRIPTION: ABNORMAL

## 2022-08-03 ENCOUNTER — HOSPITAL ENCOUNTER (OUTPATIENT)
Age: 72
Setting detail: SPECIMEN
Discharge: HOME | End: 2022-08-03
Payer: MEDICARE

## 2022-08-03 LAB
ANION GAP SERPL CALCULATED.3IONS-SCNC: 19 MMOL/L (ref 9–17)
BUN BLDV-MCNC: 9 MG/DL (ref 8–23)
CALCIUM SERPL-MCNC: 8.9 MG/DL (ref 8.6–10.4)
CHLORIDE BLD-SCNC: 100 MMOL/L (ref 98–107)
CO2: 18 MMOL/L (ref 20–31)
CREAT SERPL-MCNC: 1.19 MG/DL (ref 0.5–0.9)
GFR AFRICAN AMERICAN: 55 ML/MIN
GFR NON-AFRICAN AMERICAN: 45 ML/MIN
GFR SERPL CREATININE-BSD FRML MDRD: ABNORMAL ML/MIN/{1.73_M2}
GLUCOSE BLD-MCNC: 135 MG/DL (ref 70–99)
POTASSIUM SERPL-SCNC: 3.7 MMOL/L (ref 3.7–5.3)
SODIUM BLD-SCNC: 137 MMOL/L (ref 135–144)

## 2022-08-03 PROCEDURE — 80048 BASIC METABOLIC PNL TOTAL CA: CPT

## 2022-09-23 ENCOUNTER — ANESTHESIA (OUTPATIENT)
Dept: OPERATING ROOM | Age: 72
DRG: 271 | End: 2022-09-23
Payer: MEDICARE

## 2022-09-23 ENCOUNTER — ANESTHESIA EVENT (OUTPATIENT)
Dept: OPERATING ROOM | Age: 72
DRG: 271 | End: 2022-09-23
Payer: MEDICARE

## 2022-09-23 ENCOUNTER — APPOINTMENT (OUTPATIENT)
Dept: GENERAL RADIOLOGY | Age: 72
DRG: 271 | End: 2022-09-23
Attending: SURGERY
Payer: MEDICARE

## 2022-09-23 ENCOUNTER — HOSPITAL ENCOUNTER (INPATIENT)
Dept: CARDIAC CATH/INVASIVE PROCEDURES | Age: 72
LOS: 1 days | Discharge: HOME | DRG: 271 | End: 2022-09-24
Attending: SURGERY | Admitting: SURGERY
Payer: MEDICARE

## 2022-09-23 DIAGNOSIS — Z98.890 HISTORY OF EMBOLECTOMY: Primary | ICD-10-CM

## 2022-09-23 LAB
ABO/RH: NORMAL
ANION GAP SERPL CALCULATED.3IONS-SCNC: 13 MMOL/L (ref 9–17)
ANTIBODY SCREEN: NEGATIVE
ARM BAND NUMBER: NORMAL
BUN BLDV-MCNC: 11 MG/DL (ref 8–23)
BUN/CREAT BLD: 10 (ref 9–20)
CALCIUM SERPL-MCNC: 9.2 MG/DL (ref 8.6–10.4)
CHLORIDE BLD-SCNC: 103 MMOL/L (ref 98–107)
CO2: 23 MMOL/L (ref 20–31)
CREAT SERPL-MCNC: 1.05 MG/DL (ref 0.5–0.9)
EXPIRATION DATE: NORMAL
GFR AFRICAN AMERICAN: >60 ML/MIN
GFR NON-AFRICAN AMERICAN: 52 ML/MIN
GFR SERPL CREATININE-BSD FRML MDRD: ABNORMAL ML/MIN/{1.73_M2}
GLUCOSE BLD-MCNC: 113 MG/DL (ref 70–99)
HCT VFR BLD CALC: 29 % (ref 36.3–47.1)
HCT VFR BLD CALC: 29.6 % (ref 36.3–47.1)
HEMOGLOBIN: 8.6 G/DL (ref 11.9–15.1)
HEMOGLOBIN: 9.2 G/DL (ref 11.9–15.1)
INR BLD: 1.2
MCH RBC QN AUTO: 26.7 PG (ref 25.2–33.5)
MCH RBC QN AUTO: 27.1 PG (ref 25.2–33.5)
MCHC RBC AUTO-ENTMCNC: 29.7 G/DL (ref 28.4–34.8)
MCHC RBC AUTO-ENTMCNC: 31.1 G/DL (ref 28.4–34.8)
MCV RBC AUTO: 87.3 FL (ref 82.6–102.9)
MCV RBC AUTO: 90.1 FL (ref 82.6–102.9)
NRBC AUTOMATED: 0 PER 100 WBC
NRBC AUTOMATED: 0 PER 100 WBC
PARTIAL THROMBOPLASTIN TIME: >150 SEC (ref 23.9–33.8)
PDW BLD-RTO: 16.7 % (ref 11.8–14.4)
PDW BLD-RTO: 16.9 % (ref 11.8–14.4)
PLATELET # BLD: 283 K/UL (ref 138–453)
PLATELET # BLD: 288 K/UL (ref 138–453)
PMV BLD AUTO: 10.2 FL (ref 8.1–13.5)
PMV BLD AUTO: 10.5 FL (ref 8.1–13.5)
POTASSIUM SERPL-SCNC: 3.9 MMOL/L (ref 3.7–5.3)
PROTHROMBIN TIME: 15.3 SEC (ref 11.5–14.2)
RBC # BLD: 3.22 M/UL (ref 3.95–5.11)
RBC # BLD: 3.39 M/UL (ref 3.95–5.11)
SODIUM BLD-SCNC: 139 MMOL/L (ref 135–144)
WBC # BLD: 10.5 K/UL (ref 3.5–11.3)
WBC # BLD: 12.5 K/UL (ref 3.5–11.3)

## 2022-09-23 PROCEDURE — 85610 PROTHROMBIN TIME: CPT

## 2022-09-23 PROCEDURE — C1725 CATH, TRANSLUMIN NON-LASER: HCPCS

## 2022-09-23 PROCEDURE — 86900 BLOOD TYPING SEROLOGIC ABO: CPT

## 2022-09-23 PROCEDURE — 6370000000 HC RX 637 (ALT 250 FOR IP): Performed by: SURGERY

## 2022-09-23 PROCEDURE — 2000000000 HC ICU R&B

## 2022-09-23 PROCEDURE — B41D1ZZ FLUOROSCOPY OF AORTA AND BILATERAL LOWER EXTREMITY ARTERIES USING LOW OSMOLAR CONTRAST: ICD-10-PCS | Performed by: SURGERY

## 2022-09-23 PROCEDURE — 6360000002 HC RX W HCPCS: Performed by: SURGERY

## 2022-09-23 PROCEDURE — 6360000002 HC RX W HCPCS: Performed by: NURSE ANESTHETIST, CERTIFIED REGISTERED

## 2022-09-23 PROCEDURE — 6360000002 HC RX W HCPCS: Performed by: ANESTHESIOLOGY

## 2022-09-23 PROCEDURE — 2700000000 HC OXYGEN THERAPY PER DAY

## 2022-09-23 PROCEDURE — 04100ZK BYPASS ABDOMINAL AORTA TO BILATERAL FEMORAL ARTERIES, OPEN APPROACH: ICD-10-PCS | Performed by: SURGERY

## 2022-09-23 PROCEDURE — 6360000004 HC RX CONTRAST MEDICATION

## 2022-09-23 PROCEDURE — C1769 GUIDE WIRE: HCPCS

## 2022-09-23 PROCEDURE — C1874 STENT, COATED/COV W/DEL SYS: HCPCS

## 2022-09-23 PROCEDURE — 3700000001 HC ADD 15 MINUTES (ANESTHESIA): Performed by: SURGERY

## 2022-09-23 PROCEDURE — 047L0DZ DILATION OF LEFT FEMORAL ARTERY WITH INTRALUMINAL DEVICE, OPEN APPROACH: ICD-10-PCS | Performed by: SURGERY

## 2022-09-23 PROCEDURE — 2580000003 HC RX 258: Performed by: SURGERY

## 2022-09-23 PROCEDURE — C1760 CLOSURE DEV, VASC: HCPCS

## 2022-09-23 PROCEDURE — 3600000002 HC SURGERY LEVEL 2 BASE: Performed by: SURGERY

## 2022-09-23 PROCEDURE — 2500000003 HC RX 250 WO HCPCS: Performed by: SURGERY

## 2022-09-23 PROCEDURE — C1894 INTRO/SHEATH, NON-LASER: HCPCS

## 2022-09-23 PROCEDURE — C1887 CATHETER, GUIDING: HCPCS

## 2022-09-23 PROCEDURE — C1757 CATH, THROMBECTOMY/EMBOLECT: HCPCS | Performed by: SURGERY

## 2022-09-23 PROCEDURE — 2709999900 HC NON-CHARGEABLE SUPPLY

## 2022-09-23 PROCEDURE — 80048 BASIC METABOLIC PNL TOTAL CA: CPT

## 2022-09-23 PROCEDURE — 86850 RBC ANTIBODY SCREEN: CPT

## 2022-09-23 PROCEDURE — 85730 THROMBOPLASTIN TIME PARTIAL: CPT

## 2022-09-23 PROCEDURE — 3209999900 FLUORO FOR SURGICAL PROCEDURES

## 2022-09-23 PROCEDURE — 7100000000 HC PACU RECOVERY - FIRST 15 MIN

## 2022-09-23 PROCEDURE — 2500000003 HC RX 250 WO HCPCS

## 2022-09-23 PROCEDURE — 85027 COMPLETE CBC AUTOMATED: CPT

## 2022-09-23 PROCEDURE — 3700000000 HC ANESTHESIA ATTENDED CARE: Performed by: SURGERY

## 2022-09-23 PROCEDURE — 6360000002 HC RX W HCPCS

## 2022-09-23 PROCEDURE — 2709999900 HC NON-CHARGEABLE SUPPLY: Performed by: SURGERY

## 2022-09-23 PROCEDURE — 7100000001 HC PACU RECOVERY - ADDTL 15 MIN: Performed by: SURGERY

## 2022-09-23 PROCEDURE — 6360000004 HC RX CONTRAST MEDICATION: Performed by: SURGERY

## 2022-09-23 PROCEDURE — 2500000003 HC RX 250 WO HCPCS: Performed by: NURSE ANESTHETIST, CERTIFIED REGISTERED

## 2022-09-23 PROCEDURE — 94761 N-INVAS EAR/PLS OXIMETRY MLT: CPT

## 2022-09-23 PROCEDURE — 86901 BLOOD TYPING SEROLOGIC RH(D): CPT

## 2022-09-23 PROCEDURE — 2580000003 HC RX 258: Performed by: NURSE ANESTHETIST, CERTIFIED REGISTERED

## 2022-09-23 PROCEDURE — 7100000001 HC PACU RECOVERY - ADDTL 15 MIN

## 2022-09-23 PROCEDURE — 7100000000 HC PACU RECOVERY - FIRST 15 MIN: Performed by: SURGERY

## 2022-09-23 PROCEDURE — 94640 AIRWAY INHALATION TREATMENT: CPT

## 2022-09-23 PROCEDURE — 04CL0ZZ EXTIRPATION OF MATTER FROM LEFT FEMORAL ARTERY, OPEN APPROACH: ICD-10-PCS | Performed by: SURGERY

## 2022-09-23 PROCEDURE — 3600000012 HC SURGERY LEVEL 2 ADDTL 15MIN: Performed by: SURGERY

## 2022-09-23 PROCEDURE — A4217 STERILE WATER/SALINE, 500 ML: HCPCS | Performed by: SURGERY

## 2022-09-23 PROCEDURE — 36415 COLL VENOUS BLD VENIPUNCTURE: CPT

## 2022-09-23 RX ORDER — ASPIRIN 81 MG/1
81 TABLET ORAL DAILY
Status: DISCONTINUED | OUTPATIENT
Start: 2022-09-24 | End: 2022-09-24 | Stop reason: HOSPADM

## 2022-09-23 RX ORDER — ALBUTEROL SULFATE 90 UG/1
2 AEROSOL, METERED RESPIRATORY (INHALATION) EVERY 4 HOURS PRN
Status: DISCONTINUED | OUTPATIENT
Start: 2022-09-23 | End: 2022-09-23

## 2022-09-23 RX ORDER — HEPARIN SODIUM 1000 [USP'U]/ML
80 INJECTION, SOLUTION INTRAVENOUS; SUBCUTANEOUS ONCE
Status: COMPLETED | OUTPATIENT
Start: 2022-09-23 | End: 2022-09-23

## 2022-09-23 RX ORDER — HEPARIN SODIUM 1000 [USP'U]/ML
80 INJECTION, SOLUTION INTRAVENOUS; SUBCUTANEOUS PRN
Status: DISCONTINUED | OUTPATIENT
Start: 2022-09-23 | End: 2022-09-24

## 2022-09-23 RX ORDER — RIVAROXABAN 20 MG/1
20 TABLET, FILM COATED ORAL DAILY
COMMUNITY
Start: 2022-09-19

## 2022-09-23 RX ORDER — ALBUTEROL SULFATE 90 UG/1
2 AEROSOL, METERED RESPIRATORY (INHALATION)
Status: DISCONTINUED | OUTPATIENT
Start: 2022-09-23 | End: 2022-09-24 | Stop reason: HOSPADM

## 2022-09-23 RX ORDER — OXYCODONE HYDROCHLORIDE AND ACETAMINOPHEN 5; 325 MG/1; MG/1
2 TABLET ORAL EVERY 4 HOURS PRN
Status: DISCONTINUED | OUTPATIENT
Start: 2022-09-23 | End: 2022-09-24 | Stop reason: HOSPADM

## 2022-09-23 RX ORDER — HYDROMORPHONE HYDROCHLORIDE 1 MG/ML
0.5 INJECTION, SOLUTION INTRAMUSCULAR; INTRAVENOUS; SUBCUTANEOUS EVERY 5 MIN PRN
Status: DISCONTINUED | OUTPATIENT
Start: 2022-09-23 | End: 2022-09-23 | Stop reason: HOSPADM

## 2022-09-23 RX ORDER — HEPARIN SODIUM 10000 [USP'U]/100ML
5-30 INJECTION, SOLUTION INTRAVENOUS CONTINUOUS
Status: DISCONTINUED | OUTPATIENT
Start: 2022-09-23 | End: 2022-09-24

## 2022-09-23 RX ORDER — SODIUM CHLORIDE 9 MG/ML
INJECTION, SOLUTION INTRAVENOUS CONTINUOUS
Status: DISCONTINUED | OUTPATIENT
Start: 2022-09-23 | End: 2022-09-23

## 2022-09-23 RX ORDER — OXYCODONE HYDROCHLORIDE 5 MG/1
5 TABLET ORAL
Status: DISCONTINUED | OUTPATIENT
Start: 2022-09-23 | End: 2022-09-23 | Stop reason: HOSPADM

## 2022-09-23 RX ORDER — CETIRIZINE HYDROCHLORIDE 10 MG/1
10 TABLET ORAL DAILY
Status: DISCONTINUED | OUTPATIENT
Start: 2022-09-23 | End: 2022-09-24 | Stop reason: HOSPADM

## 2022-09-23 RX ORDER — PROPOFOL 10 MG/ML
INJECTION, EMULSION INTRAVENOUS CONTINUOUS PRN
Status: DISCONTINUED | OUTPATIENT
Start: 2022-09-23 | End: 2022-09-23 | Stop reason: SDUPTHER

## 2022-09-23 RX ORDER — ONDANSETRON 2 MG/ML
4 INJECTION INTRAMUSCULAR; INTRAVENOUS
Status: DISCONTINUED | OUTPATIENT
Start: 2022-09-23 | End: 2022-09-23 | Stop reason: HOSPADM

## 2022-09-23 RX ORDER — FENTANYL CITRATE 50 UG/ML
INJECTION, SOLUTION INTRAMUSCULAR; INTRAVENOUS
Status: COMPLETED
Start: 2022-09-23 | End: 2022-09-23

## 2022-09-23 RX ORDER — ONDANSETRON 4 MG/1
4 TABLET, ORALLY DISINTEGRATING ORAL EVERY 8 HOURS PRN
Status: DISCONTINUED | OUTPATIENT
Start: 2022-09-23 | End: 2022-09-24 | Stop reason: HOSPADM

## 2022-09-23 RX ORDER — FENTANYL CITRATE 50 UG/ML
25 INJECTION, SOLUTION INTRAMUSCULAR; INTRAVENOUS EVERY 5 MIN PRN
Status: COMPLETED | OUTPATIENT
Start: 2022-09-23 | End: 2022-09-23

## 2022-09-23 RX ORDER — FENTANYL CITRATE 50 UG/ML
INJECTION, SOLUTION INTRAMUSCULAR; INTRAVENOUS PRN
Status: DISCONTINUED | OUTPATIENT
Start: 2022-09-23 | End: 2022-09-23 | Stop reason: SDUPTHER

## 2022-09-23 RX ORDER — SODIUM CHLORIDE 0.9 % (FLUSH) 0.9 %
5-40 SYRINGE (ML) INJECTION PRN
Status: DISCONTINUED | OUTPATIENT
Start: 2022-09-23 | End: 2022-09-24 | Stop reason: HOSPADM

## 2022-09-23 RX ORDER — ALENDRONATE SODIUM 70 MG/1
35 TABLET ORAL
Status: DISCONTINUED | OUTPATIENT
Start: 2022-09-23 | End: 2022-09-23 | Stop reason: RX

## 2022-09-23 RX ORDER — SODIUM CHLORIDE 9 MG/ML
INJECTION, SOLUTION INTRAVENOUS PRN
Status: DISCONTINUED | OUTPATIENT
Start: 2022-09-23 | End: 2022-09-24 | Stop reason: HOSPADM

## 2022-09-23 RX ORDER — KETOROLAC TROMETHAMINE 30 MG/ML
15 INJECTION, SOLUTION INTRAMUSCULAR; INTRAVENOUS EVERY 6 HOURS
Status: DISCONTINUED | OUTPATIENT
Start: 2022-09-23 | End: 2022-09-24 | Stop reason: HOSPADM

## 2022-09-23 RX ORDER — ESMOLOL HYDROCHLORIDE 10 MG/ML
INJECTION INTRAVENOUS PRN
Status: DISCONTINUED | OUTPATIENT
Start: 2022-09-23 | End: 2022-09-23 | Stop reason: SDUPTHER

## 2022-09-23 RX ORDER — SODIUM CHLORIDE 0.9 % (FLUSH) 0.9 %
5-40 SYRINGE (ML) INJECTION PRN
Status: DISCONTINUED | OUTPATIENT
Start: 2022-09-23 | End: 2022-09-23 | Stop reason: HOSPADM

## 2022-09-23 RX ORDER — LOSARTAN POTASSIUM 50 MG/1
50 TABLET ORAL DAILY
Status: DISCONTINUED | OUTPATIENT
Start: 2022-09-24 | End: 2022-09-24 | Stop reason: HOSPADM

## 2022-09-23 RX ORDER — OXYCODONE HYDROCHLORIDE AND ACETAMINOPHEN 5; 325 MG/1; MG/1
1 TABLET ORAL EVERY 4 HOURS PRN
Status: DISCONTINUED | OUTPATIENT
Start: 2022-09-23 | End: 2022-09-24 | Stop reason: HOSPADM

## 2022-09-23 RX ORDER — FOLIC ACID 1 MG/1
1 TABLET ORAL DAILY
Status: DISCONTINUED | OUTPATIENT
Start: 2022-09-24 | End: 2022-09-24 | Stop reason: HOSPADM

## 2022-09-23 RX ORDER — FENTANYL CITRATE 50 UG/ML
25 INJECTION, SOLUTION INTRAMUSCULAR; INTRAVENOUS EVERY 5 MIN PRN
Status: DISCONTINUED | OUTPATIENT
Start: 2022-09-23 | End: 2022-09-23 | Stop reason: HOSPADM

## 2022-09-23 RX ORDER — LEVOTHYROXINE SODIUM 0.05 MG/1
50 TABLET ORAL
Status: DISCONTINUED | OUTPATIENT
Start: 2022-09-24 | End: 2022-09-24 | Stop reason: HOSPADM

## 2022-09-23 RX ORDER — SODIUM CHLORIDE 9 MG/ML
INJECTION, SOLUTION INTRAVENOUS CONTINUOUS
Status: DISCONTINUED | OUTPATIENT
Start: 2022-09-23 | End: 2022-09-23 | Stop reason: DRUGHIGH

## 2022-09-23 RX ORDER — CEFAZOLIN SODIUM 1 G/3ML
INJECTION, POWDER, FOR SOLUTION INTRAMUSCULAR; INTRAVENOUS PRN
Status: DISCONTINUED | OUTPATIENT
Start: 2022-09-23 | End: 2022-09-23 | Stop reason: SDUPTHER

## 2022-09-23 RX ORDER — ALPRAZOLAM 1 MG/1
1 TABLET ORAL 3 TIMES DAILY PRN
Status: DISCONTINUED | OUTPATIENT
Start: 2022-09-23 | End: 2022-09-24 | Stop reason: HOSPADM

## 2022-09-23 RX ORDER — SODIUM CHLORIDE, SODIUM LACTATE, POTASSIUM CHLORIDE, CALCIUM CHLORIDE 600; 310; 30; 20 MG/100ML; MG/100ML; MG/100ML; MG/100ML
INJECTION, SOLUTION INTRAVENOUS CONTINUOUS PRN
Status: DISCONTINUED | OUTPATIENT
Start: 2022-09-23 | End: 2022-09-23 | Stop reason: SDUPTHER

## 2022-09-23 RX ORDER — VARENICLINE TARTRATE 1 MG/1
1 TABLET, FILM COATED ORAL 2 TIMES DAILY
Status: DISCONTINUED | OUTPATIENT
Start: 2022-09-23 | End: 2022-09-23 | Stop reason: RX

## 2022-09-23 RX ORDER — LIDOCAINE HYDROCHLORIDE 20 MG/ML
INJECTION, SOLUTION EPIDURAL; INFILTRATION; INTRACAUDAL; PERINEURAL PRN
Status: DISCONTINUED | OUTPATIENT
Start: 2022-09-23 | End: 2022-09-23 | Stop reason: SDUPTHER

## 2022-09-23 RX ORDER — METOPROLOL TARTRATE 5 MG/5ML
INJECTION INTRAVENOUS PRN
Status: DISCONTINUED | OUTPATIENT
Start: 2022-09-23 | End: 2022-09-23 | Stop reason: SDUPTHER

## 2022-09-23 RX ORDER — SODIUM CHLORIDE 9 MG/ML
INJECTION, SOLUTION INTRAVENOUS CONTINUOUS
Status: ACTIVE | OUTPATIENT
Start: 2022-09-23 | End: 2022-09-23

## 2022-09-23 RX ORDER — CYANOCOBALAMIN 1000 UG/ML
1000 INJECTION INTRAMUSCULAR; SUBCUTANEOUS
Status: DISCONTINUED | OUTPATIENT
Start: 2022-10-01 | End: 2022-09-24 | Stop reason: HOSPADM

## 2022-09-23 RX ORDER — AMLODIPINE BESYLATE 5 MG/1
5 TABLET ORAL DAILY
Status: DISCONTINUED | OUTPATIENT
Start: 2022-09-24 | End: 2022-09-24 | Stop reason: HOSPADM

## 2022-09-23 RX ORDER — ONDANSETRON 2 MG/ML
4 INJECTION INTRAMUSCULAR; INTRAVENOUS EVERY 6 HOURS PRN
Status: DISCONTINUED | OUTPATIENT
Start: 2022-09-23 | End: 2022-09-24 | Stop reason: HOSPADM

## 2022-09-23 RX ORDER — ACETAMINOPHEN 325 MG/1
650 TABLET ORAL EVERY 4 HOURS PRN
Status: DISCONTINUED | OUTPATIENT
Start: 2022-09-23 | End: 2022-09-24 | Stop reason: HOSPADM

## 2022-09-23 RX ORDER — SODIUM CHLORIDE 0.9 % (FLUSH) 0.9 %
5-40 SYRINGE (ML) INJECTION EVERY 12 HOURS SCHEDULED
Status: DISCONTINUED | OUTPATIENT
Start: 2022-09-23 | End: 2022-09-23 | Stop reason: HOSPADM

## 2022-09-23 RX ORDER — BUDESONIDE AND FORMOTEROL FUMARATE DIHYDRATE 160; 4.5 UG/1; UG/1
1 AEROSOL RESPIRATORY (INHALATION) 2 TIMES DAILY
Status: DISCONTINUED | OUTPATIENT
Start: 2022-09-23 | End: 2022-09-24 | Stop reason: HOSPADM

## 2022-09-23 RX ORDER — SODIUM CHLORIDE 0.9 % (FLUSH) 0.9 %
5-40 SYRINGE (ML) INJECTION EVERY 12 HOURS SCHEDULED
Status: DISCONTINUED | OUTPATIENT
Start: 2022-09-23 | End: 2022-09-24 | Stop reason: HOSPADM

## 2022-09-23 RX ORDER — PREGABALIN 75 MG/1
150 CAPSULE ORAL 2 TIMES DAILY
Status: DISCONTINUED | OUTPATIENT
Start: 2022-09-23 | End: 2022-09-24 | Stop reason: HOSPADM

## 2022-09-23 RX ORDER — FUROSEMIDE 20 MG/1
20 TABLET ORAL DAILY
Status: DISCONTINUED | OUTPATIENT
Start: 2022-09-23 | End: 2022-09-24 | Stop reason: HOSPADM

## 2022-09-23 RX ORDER — HEPARIN SODIUM 1000 [USP'U]/ML
40 INJECTION, SOLUTION INTRAVENOUS; SUBCUTANEOUS PRN
Status: DISCONTINUED | OUTPATIENT
Start: 2022-09-23 | End: 2022-09-24

## 2022-09-23 RX ORDER — MIDAZOLAM HYDROCHLORIDE 1 MG/ML
INJECTION INTRAMUSCULAR; INTRAVENOUS PRN
Status: DISCONTINUED | OUTPATIENT
Start: 2022-09-23 | End: 2022-09-23 | Stop reason: SDUPTHER

## 2022-09-23 RX ORDER — SODIUM CHLORIDE 9 MG/ML
INJECTION, SOLUTION INTRAVENOUS PRN
Status: DISCONTINUED | OUTPATIENT
Start: 2022-09-23 | End: 2022-09-23 | Stop reason: HOSPADM

## 2022-09-23 RX ORDER — BUPIVACAINE HYDROCHLORIDE 5 MG/ML
INJECTION, SOLUTION EPIDURAL; INTRACAUDAL PRN
Status: DISCONTINUED | OUTPATIENT
Start: 2022-09-23 | End: 2022-09-23 | Stop reason: ALTCHOICE

## 2022-09-23 RX ADMIN — KETOROLAC TROMETHAMINE 15 MG: 30 INJECTION, SOLUTION INTRAMUSCULAR at 22:56

## 2022-09-23 RX ADMIN — OXYCODONE AND ACETAMINOPHEN 2 TABLET: 5; 325 TABLET ORAL at 15:34

## 2022-09-23 RX ADMIN — FENTANYL CITRATE 25 MCG: 50 INJECTION, SOLUTION INTRAMUSCULAR; INTRAVENOUS at 12:03

## 2022-09-23 RX ADMIN — SODIUM CHLORIDE: 9 INJECTION, SOLUTION INTRAVENOUS at 12:10

## 2022-09-23 RX ADMIN — SODIUM CHLORIDE: 9 INJECTION, SOLUTION INTRAVENOUS at 08:26

## 2022-09-23 RX ADMIN — SODIUM CHLORIDE, PRESERVATIVE FREE 10 ML: 5 INJECTION INTRAVENOUS at 22:57

## 2022-09-23 RX ADMIN — FENTANYL CITRATE 25 MCG: 50 INJECTION, SOLUTION INTRAMUSCULAR; INTRAVENOUS at 11:21

## 2022-09-23 RX ADMIN — FENTANYL CITRATE 25 MCG: 50 INJECTION, SOLUTION INTRAMUSCULAR; INTRAVENOUS at 11:29

## 2022-09-23 RX ADMIN — PREGABALIN 150 MG: 75 CAPSULE ORAL at 22:56

## 2022-09-23 RX ADMIN — Medication 50 MCG: at 13:20

## 2022-09-23 RX ADMIN — SODIUM CHLORIDE, POTASSIUM CHLORIDE, SODIUM LACTATE AND CALCIUM CHLORIDE: 600; 310; 30; 20 INJECTION, SOLUTION INTRAVENOUS at 12:09

## 2022-09-23 RX ADMIN — CEFAZOLIN 2000 MG: 1 INJECTION, POWDER, FOR SOLUTION INTRAMUSCULAR; INTRAVENOUS at 12:15

## 2022-09-23 RX ADMIN — BUDESONIDE AND FORMOTEROL FUMARATE DIHYDRATE 1 PUFF: 160; 4.5 AEROSOL RESPIRATORY (INHALATION) at 21:34

## 2022-09-23 RX ADMIN — FENTANYL CITRATE 25 MCG: 50 INJECTION, SOLUTION INTRAMUSCULAR; INTRAVENOUS at 11:39

## 2022-09-23 RX ADMIN — METOPROLOL TARTRATE 2.5 MG: 5 INJECTION INTRAVENOUS at 13:10

## 2022-09-23 RX ADMIN — CETIRIZINE HYDROCHLORIDE 10 MG: 10 TABLET, FILM COATED ORAL at 15:56

## 2022-09-23 RX ADMIN — ESMOLOL HYDROCHLORIDE 30 MG: 100 INJECTION, SOLUTION INTRAVENOUS at 12:38

## 2022-09-23 RX ADMIN — HEPARIN SODIUM 6240 UNITS: 1000 INJECTION INTRAVENOUS; SUBCUTANEOUS at 11:51

## 2022-09-23 RX ADMIN — MIDAZOLAM 2 MG: 1 INJECTION INTRAMUSCULAR; INTRAVENOUS at 12:07

## 2022-09-23 RX ADMIN — Medication 50 MCG: at 12:11

## 2022-09-23 RX ADMIN — HEPARIN SODIUM AND DEXTROSE 18 UNITS/KG/HR: 10000; 5 INJECTION INTRAVENOUS at 18:17

## 2022-09-23 RX ADMIN — LIDOCAINE HYDROCHLORIDE 20 MG: 20 INJECTION, SOLUTION EPIDURAL; INFILTRATION; INTRACAUDAL; PERINEURAL at 12:58

## 2022-09-23 RX ADMIN — FUROSEMIDE 20 MG: 20 TABLET ORAL at 15:56

## 2022-09-23 RX ADMIN — ESMOLOL HYDROCHLORIDE 30 MG: 100 INJECTION, SOLUTION INTRAVENOUS at 12:28

## 2022-09-23 RX ADMIN — SODIUM CHLORIDE: 9 INJECTION, SOLUTION INTRAVENOUS at 15:36

## 2022-09-23 RX ADMIN — PROPOFOL 50 MCG/KG/MIN: 10 INJECTION, EMULSION INTRAVENOUS at 12:11

## 2022-09-23 RX ADMIN — METOPROLOL TARTRATE 2.5 MG: 5 INJECTION INTRAVENOUS at 13:07

## 2022-09-23 RX ADMIN — ACETAMINOPHEN 650 MG: 325 TABLET ORAL at 10:51

## 2022-09-23 RX ADMIN — LIDOCAINE HYDROCHLORIDE 20 MG: 20 INJECTION, SOLUTION EPIDURAL; INFILTRATION; INTRACAUDAL; PERINEURAL at 12:53

## 2022-09-23 RX ADMIN — ESMOLOL HYDROCHLORIDE 30 MG: 100 INJECTION, SOLUTION INTRAVENOUS at 12:19

## 2022-09-23 RX ADMIN — HEPARIN SODIUM AND DEXTROSE 17.95 UNITS/KG/HR: 10000; 5 INJECTION INTRAVENOUS at 11:52

## 2022-09-23 ASSESSMENT — PAIN DESCRIPTION - LOCATION
LOCATION: TOE (COMMENT WHICH ONE)
LOCATION: FOOT
LOCATION: GROIN
LOCATION: FOOT

## 2022-09-23 ASSESSMENT — PAIN SCALES - GENERAL
PAINLEVEL_OUTOF10: 10

## 2022-09-23 ASSESSMENT — PAIN DESCRIPTION - ORIENTATION
ORIENTATION: LEFT

## 2022-09-23 ASSESSMENT — PAIN DESCRIPTION - DESCRIPTORS
DESCRIPTORS: SHARP
DESCRIPTORS: STABBING

## 2022-09-23 ASSESSMENT — PAIN - FUNCTIONAL ASSESSMENT: PAIN_FUNCTIONAL_ASSESSMENT: 0-10

## 2022-09-23 NOTE — PROGRESS NOTES
Called to evaluate patient who was having rest pain in the left foot. By the time I arrived she appears to be resting comfortably. I was able to find an anterior tibial signal at the ankle. Capillary refill is normal to the foot. From my standpoint she is okay to get up and ambulate. Continue heparin drip and anticoagulation.

## 2022-09-23 NOTE — H&P
Interval H&P Note    Pt Name: Nasra Leon  MRN: [id number]  YOB: 1950  Date of evaluation: 9/23/2022      [x] I have reviewed office visit note by Dr Joellen Drake dated 9/14/22 for an Interval History and Physical note. [x] I have examined  Nasra Leon  There are no changes to the patient who is scheduled for arteriogram with intervention and possbile stenting for  Intermittent claudication of left lower extremity due to atherosclerosis of bypass graft. The patient denies new health changes, fever, chills, wheezing, cough, increased SOB, chest pain, open sores or wounds. Last ate and drank yesterday 9pm. Last took blood thinner (xarelto) 4 days ago. Last took aspirin (unsure). Vital signs: BP (!) 112/51   Pulse (!) 112   Temp 97.7 °F (36.5 °C) (Temporal)   Resp 24   Ht 5' 3\" (1.6 m)   Wt 172 lb (78 kg)   SpO2 98%   BMI 30.47 kg/m²     Allergies: Iv contrast [iodides]    Medications:    Prior to Admission medications    Medication Sig Start Date End Date Taking? Authorizing Provider   azithromycin (ZITHROMAX) 250 MG tablet Take 2 tabs (500 mg) on Day 1, and take 1 tab (250 mg) on days 2 through 5. Patient not taking: Reported on 9/23/2022 7/9/22   Cata Rollins DO   Roflumilast (DALIRESP) 500 MCG tablet Take 500 mcg by mouth daily    Historical Provider, MD   amLODIPine (NORVASC) 5 MG tablet Take 5 mg by mouth daily    Historical Provider, MD   [facility] 160-4.5 MCG/ACT AERO  9/23/21   Historical Provider, MD   calcium carbonate 1500 (600 Ca) MG TABS tablet  9/20/21   Historical Provider, MD   furosemide (LASIX) 40 MG tablet Take 20 mg by mouth daily 9/21/21   Historical Provider, MD   pregabalin (LYRICA) 100 MG capsule Take 100 mg by mouth 2 times daily.      Historical Provider, MD   alendronate (FOSAMAX) 35 MG tablet Take 35 mg by mouth every 7 days    Historical Provider, MD   aspirin 81 MG EC tablet Take 81 mg by mouth daily    Historical Provider, MD   varenicline (CHANTIX) 1 MG tablet Take 1 mg by mouth 2 times daily    Historical Provider, MD   fexofenadine (ALLEGRA) 180 MG tablet Take 180 mg by mouth daily    Historical Provider, MD   losartan (COZAAR) 100 MG tablet Take 100 mg by mouth daily. Historical Provider, MD   levothyroxine (SYNTHROID) 50 MCG tablet Take 50 mcg by mouth Daily. Historical Provider, MD   omeprazole (PRILOSEC) 40 MG delayed release capsule Take 40 mg by mouth daily. Historical Provider, MD   albuterol (PROVENTIL HFA;VENTOLIN HFA) 108 (90 BASE) MCG/ACT inhaler Inhale 2 puffs into the lungs every 4 hours as needed for Wheezing. Historical Provider, MD   ALPRAZolam Andrews Paci) 1 MG tablet Take 1 mg by mouth 3 times daily as needed for Sleep or Anxiety. Arnaud Ingram Historical Provider, MD   cyanocobalamin 1000 MCG/ML injection Inject 1,000 mcg into the muscle once. Patient takes monthly    Historical Provider, MD   folic acid (FOLVITE) 1 MG tablet Take 1 mg by mouth daily. Historical Provider, MD   Cholecalciferol (VITAMIN D) 2000 UNITS TABS Take  by mouth. Historical Provider, MD         This is a 73 y.o. female who is pleasant, cooperative, alert and oriented x3, in no acute distress. Heart: Heart sounds are normal.  HR  regular rate and rhythm without murmur, gallop or rub. Lungs: Normal respiratory effort with equal expansion, good air exchange, unlabored and clear to auscultation without wheezes or rales bilaterally   Abdomen: soft, nontender, nondistended with bowel sounds . Labs:  Recent Labs     09/23/22  0821   HGB 8.6*   HCT 29.0*   WBC 10.5   MCV 90.1          No results for input(s): COVID19 in the last 720 hours.     RAYMUNDO Rahman CNP  Electronically signed 9/23/2022 at 8:38 AM

## 2022-09-23 NOTE — RT PROTOCOL NOTE
RT Inhaler-Nebulizer Bronchodilator Protocol Note    There is a bronchodilator order in the chart from a provider indicating to follow the RT Bronchodilator Protocol and there is an Initiate RT Inhaler-Nebulizer Bronchodilator Protocol order as well (see protocol at bottom of note). CXR Findings:  No results found. The findings from the last RT Protocol Assessment were as follows:   History Pulmonary Disease: Chronic pulmonary disease  Respiratory Pattern: Regular pattern and RR 12-20 bpm  Breath Sounds: Clear breath sounds  Cough: Strong, spontaneous, non-productive  Indication for Bronchodilator Therapy: On home bronchodilators  Bronchodilator Assessment Score: 2    Aerosolized bronchodilator medication orders have been revised according to the RT Inhaler-Nebulizer Bronchodilator Protocol below. Respiratory Therapist to perform RT Therapy Protocol Assessment initially then follow the protocol. Repeat RT Therapy Protocol Assessment PRN for score 0-3 or on second treatment, BID, and PRN for scores above 3. No Indications - adjust the frequency to every 6 hours PRN wheezing or bronchospasm, if no treatments needed after 48 hours then discontinue using Per Protocol order mode. If indication present, adjust the RT bronchodilator orders based on the Bronchodilator Assessment Score as indicated below. Use Inhaler orders unless patient has one or more of the following: on home nebulizer, not able to hold breath for 10 seconds, is not alert and oriented, cannot activate and use MDI correctly, or respiratory rate 25 breaths per minute or more, then use the equivalent nebulizer order(s) with same Frequency and PRN reasons based on the score. If a patient is on this medication at home then do not decrease Frequency below that used at home.     0-3 - enter or revise RT bronchodilator order(s) to equivalent RT Bronchodilator order with Frequency of every 4 hours PRN for wheezing or increased work of breathing using Per Protocol order mode. 4-6 - enter or revise RT Bronchodilator order(s) to two equivalent RT bronchodilator orders with one order with BID Frequency and one order with Frequency of every 4 hours PRN wheezing or increased work of breathing using Per Protocol order mode. 7-10 - enter or revise RT Bronchodilator order(s) to two equivalent RT bronchodilator orders with one order with TID Frequency and one order with Frequency of every 4 hours PRN wheezing or increased work of breathing using Per Protocol order mode. 11-13 - enter or revise RT Bronchodilator order(s) to one equivalent RT bronchodilator order with QID Frequency and an Albuterol order with Frequency of every 4 hours PRN wheezing or increased work of breathing using Per Protocol order mode. Greater than 13 - enter or revise RT Bronchodilator order(s) to one equivalent RT bronchodilator order with every 4 hours Frequency and an Albuterol order with Frequency of every 2 hours PRN wheezing or increased work of breathing using Per Protocol order mode. RT to enter RT Home Evaluation for COPD & MDI Assessment order using Per Protocol order mode.     Electronically signed by Dru AdjutantEDWIN on 9/23/2022 at 3:49 PM

## 2022-09-23 NOTE — PROGRESS NOTES
Pharmacy Note  You patient, Padmini Velarde, has an order to continue the home bisphosphonate Alendronate(Fosamax)    Per [facility] approved policy, this maintenance medication is not continued while the patient is inpatient. Please resume this upon discharge if it remains appropriate.     Candi Velazquez [facility], PharmD   9/23/2022  3:12 PM

## 2022-09-23 NOTE — ANESTHESIA PRE PROCEDURE
Department of Anesthesiology  Preprocedure Note       Name:  Vero Dawn   Age:  73 y.o.  :  1950                                          MRN:  [id number]         Date:  2022      Surgeon: Colleen Dorantes):  Floyd Roberts MD    Procedure: Procedure(s): FEMORAL EMBOLECTOMY THROMBECTOMY    Medications prior to admission:   Prior to Admission medications    Medication Sig Start Date End Date Taking? Authorizing Provider   XARELTO 20 MG TABS tablet Take 20 mg by mouth daily 22   Historical Provider, MD   Roflumilast (DALIRESP) 500 MCG tablet Take 500 mcg by mouth daily    Historical Provider, MD   amLODIPine (NORVASC) 5 MG tablet Take 5 mg by mouth daily    Historical Provider, MD   [facility] 160-4.5 MCG/ACT AERO  21   Historical Provider, MD   calcium carbonate 1500 (600 Ca) MG TABS tablet  21   Historical Provider, MD   furosemide (LASIX) 40 MG tablet Take 20 mg by mouth daily 21   Historical Provider, MD   pregabalin (LYRICA) 100 MG capsule Take 100 mg by mouth 2 times daily. Historical Provider, MD   alendronate (FOSAMAX) 35 MG tablet Take 35 mg by mouth every 7 days    Historical Provider, MD   aspirin 81 MG EC tablet Take 81 mg by mouth daily    Historical Provider, MD   varenicline (CHANTIX) 1 MG tablet Take 1 mg by mouth 2 times daily    Historical Provider, MD   fexofenadine (ALLEGRA) 180 MG tablet Take 180 mg by mouth daily    Historical Provider, MD   losartan (COZAAR) 100 MG tablet Take 100 mg by mouth daily. Historical Provider, MD   levothyroxine (SYNTHROID) 50 MCG tablet Take 50 mcg by mouth Daily. Historical Provider, MD   omeprazole (PRILOSEC) 40 MG delayed release capsule Take 40 mg by mouth daily. Historical Provider, MD   albuterol (PROVENTIL HFA;VENTOLIN HFA) 108 (90 BASE) MCG/ACT inhaler Inhale 2 puffs into the lungs every 4 hours as needed for Wheezing.     Historical Provider, MD   ALPRAZolam Robbin Patience) 1 MG tablet Take 1 mg by mouth 3 times daily as needed for Sleep or Anxiety. Lucinamichael Alexander Historical Provider, MD   cyanocobalamin 1000 MCG/ML injection Inject 1,000 mcg into the muscle once. Patient takes monthly    Historical Provider, MD   folic acid (FOLVITE) 1 MG tablet Take 1 mg by mouth daily. Historical Provider, MD   Cholecalciferol (VITAMIN D) 2000 UNITS TABS Take  by mouth.     Historical Provider, MD       Current medications:    Current Facility-Administered Medications   Medication Dose Route Frequency Provider Last Rate Last Admin    [MAR Hold] acetaminophen (TYLENOL) tablet 650 mg  650 mg Oral Q4H PRN Hiro Charles MD   650 mg at 09/23/22 1051    [MAR Hold] 0.9 % sodium chloride infusion   IntraVENous Continuous Hiro Charles MD        [facility] Hold] heparin (porcine) injection 6,240 Units  80 Units/kg IntraVENous PRN Hiro Charles MD        [facility] Hold] heparin (porcine) injection 3,120 Units  40 Units/kg IntraVENous PRN Hiro Charles MD        [facility] Hold] heparin 25,000 units in dextrose 5% 250 mL (premix) infusion  5-30 Units/kg/hr IntraVENous Continuous Hiro Charles MD   Stopped at 09/23/22 1227    bupivacaine (PF) (MARCAINE) 0.5 % injection    PRN Hiro Charles MD   11 mL at 09/23/22 1230    gelatin adsorbable (GELFOAM) sponge    PRN Hiro Charles MD   1 each at 09/23/22 1240     Facility-Administered Medications Ordered in Other Encounters   Medication Dose Route Frequency Provider Last Rate Last Admin    esmolol (BREVIBLOC) injection   IntraVENous PRN Caydene Spivey, APRN - CRNA   30 mg at 09/23/22 1238    ceFAZolin (ANCEF) injection   IntraVENous PRN Caydene Spivey, APRN - CRNA   2,000 mg at 09/23/22 1215    propofol injection   IntraVENous Continuous PRN Rajan Spearsa, APRN - CRNA 14.04 mL/hr at 09/23/22 1222 30 mcg/kg/min at 09/23/22 1222    fentaNYL (SUBLIMAZE) injection   IntraVENous PRN Caydene Anupama, APRN - CRNA   50 mcg at 09/23/22 1211    midazolam (VERSED) injection  TONSILLECTOMY      TUMOR REMOVAL Left     carotid       Social History:    Social History     Tobacco Use    Smoking status: Every Day     Packs/day: 1.00     Types: Cigarettes     Last attempt to quit: 2014     Years since quittin.4    Smokeless tobacco: Former    Tobacco comments:     USING E-CIG   Substance Use Topics    Alcohol use: No     Alcohol/week: 0.0 standard drinks                                Ready to quit: Not Answered  Counseling given: Not Answered  Tobacco comments: USING E-CIG      Vital Signs (Current):   Vitals:    22 1115 22 1130 22 1145 22 1200   BP: (!) 149/79 (!) 154/114 (!) 150/69 (!) 144/90   Pulse: (!) 121 (!) 128 (!) 122 (!) 120   Resp:    Temp:       TempSrc:       SpO2: 97% 98% 97% 97%   Weight:       Height:                                                  BP Readings from Last 3 Encounters:   22 (!) 144/90   22 (!) 108/51   22 (!) 111/46       NPO Status: Time of last liquid consumption:                         Time of last solid consumption:                         Date of last liquid consumption: 22                        Date of last solid food consumption: 22    BMI:   Wt Readings from Last 3 Encounters:   22 172 lb (78 kg)   22 177 lb 14.6 oz (80.7 kg)   22 176 lb (79.8 kg)     Body mass index is 30.47 kg/m².     CBC:   Lab Results   Component Value Date/Time    WBC 12.5 2022 12:04 PM    RBC 3.39 2022 12:04 PM    HGB 9.2 2022 12:04 PM    HCT 29.6 2022 12:04 PM    MCV 87.3 2022 12:04 PM    RDW 16.7 2022 12:04 PM     2022 12:04 PM       CMP:   Lab Results   Component Value Date/Time     2022 08:21 AM    K 3.9 2022 08:21 AM     2022 08:21 AM    CO2 23 2022 08:21 AM    BUN 11 2022 08:21 AM    CREATININE 1.05 2022 08:21 AM    GFRAA >60 2022 08:21 AM    LABGLOM 52 2022 08:21 AM    GLUCOSE 113 09/23/2022 08:21 AM    PROT 6.5 07/08/2022 08:10 PM    CALCIUM 9.2 09/23/2022 08:21 AM    BILITOT 0.40 07/08/2022 08:10 PM    ALKPHOS 163 07/08/2022 08:10 PM    AST 14 07/08/2022 08:10 PM    ALT 11 07/08/2022 08:10 PM       POC Tests: No results for input(s): POCGLU, POCNA, POCK, POCCL, POCBUN, POCHEMO, POCHCT in the last 72 hours. Coags:   Lab Results   Component Value Date/Time    PROTIME 15.3 09/23/2022 12:04 PM    INR 1.2 09/23/2022 12:04 PM    APTT 25.0 10/06/2021 11:07 AM       HCG (If Applicable): No results found for: PREGTESTUR, PREGSERUM, HCG, HCGQUANT     ABGs: No results found for: PHART, PO2ART, OEQ0TWO, NQM9MLM, BEART, T7YHJCYE     Type & Screen (If Applicable):  No results found for: LABABO, LABRH    Drug/Infectious Status (If Applicable):  No results found for: HIV, HEPCAB    COVID-19 Screening (If Applicable):   Lab Results   Component Value Date/Time    COVID19 Not Detected 10/06/2021 09:22 AM           Anesthesia Evaluation   no history of anesthetic complications:   Airway: Mallampati: II  TM distance: >3 FB   Neck ROM: full  Mouth opening: > = 3 FB   Dental:          Pulmonary:normal exam    (+) COPD:  sleep apnea:                             Cardiovascular:    (+) hypertension:, CAD:, dysrhythmias:, CHF:,                   Neuro/Psych:               GI/Hepatic/Renal:   (+) GERD:, hepatitis: B,           Endo/Other:    (+) hypothyroidism::., .                 Abdominal:             Vascular:   + PVD, aortic or cerebral, . Other Findings:           Anesthesia Plan      MAC, general and TIVA     ASA 4 - emergent           MIPS: Postoperative opioids intended and Prophylactic antiemetics administered. Anesthetic plan and risks discussed with patient. Use of blood products discussed with patient whom consented to blood products.      Attending anesthesiologist reviewed and agrees with Brea Peterson MD   9/23/2022

## 2022-09-23 NOTE — PLAN OF CARE
Problem: Respiratory - Adult  Goal: Achieves optimal ventilation and oxygenation  9/23/2022 1549 by Yunier Aranda RCP  Outcome: Progressing  9/23/2022 1549 by Yunier Aranda RCP  Flowsheets (Taken 9/23/2022 1549)  Achieves optimal ventilation and oxygenation:   Assess for changes in respiratory status   Respiratory therapy support as indicated  Note: BRONCHOSPASM/BRONCHOCONSTRICTION    IMPROVE  AERATION/BREATHSOUNDS  ADMINISTER BRONCHODILATOR THERAPY AS APPROPRIATE  ASSESS BREATH SOUNDS  PATIENT EDUCATION AS NEEDED

## 2022-09-23 NOTE — FLOWSHEET NOTE
09/23/22 1523   Treatment Team Notification   Reason for Communication Review case   Team Member Name Dr Freya Savage Team Role Attending Provider   Method of Communication Call   Response See orders   Notification Time 1525     Dr Mehdi Wilcox contacted upon arrival of pt to unit post-op  Verbal orders received:  Maintain warming blanket to BLE  Neurovascular checks q4h  No bedrest restrictions required  PO pain meds q4h prn  NS @ 100ml/hr x 5 hours  Resume heparin gtt at 1800 without bolus  Xarelto may be resumed tomorrow    See orders for details

## 2022-09-23 NOTE — FLOWSHEET NOTE
09/23/22 4253   Treatment Team Notification   Reason for Communication Review case   Team Member Name Dr Allegra Grubbs Attending Provider   Method of Communication Secure Message     MD contacted per pt and family request d/t poor mgmt of pain. Pt is currently receiving 5-10mg oxycodone/acetaminophen q4h prn, which is not effective. Pt s/p multiple procedures with Dr Mehdi Wilcox today; no pedal pulse palpable in LLE. Dr Mehdi Wilcox to bedside. Explained procedure and expectations at length with patient and family at bedside. C/o pain diminished at time of rounding. Doppler pulse found by MD, marked for future reference. Per MD, pt is welcome to move BLE and walk prn to encourage circulation and decrease pain. Not necessary to contact MD for lack of pedal pulse in left foot. Will reevaluate in the morning.

## 2022-09-23 NOTE — ANESTHESIA POSTPROCEDURE EVALUATION
Department of Anesthesiology  Postprocedure Note    Patient: Xavi Huntley  MRN: [id number]  YOB: 1950  Date of evaluation: 9/23/2022      Procedure Summary     Date: 09/23/22 Room / Location: [address] - INPATIENT    Anesthesia Start: 1209 Anesthesia Stop: 5066    Procedure: FEMORAL EMBOLECTOMY THROMBECTOMY (Groin) Diagnosis: Thrombosis      (THOMBOSIS)    Surgeons: Sherin Gonzáles MD Responsible Provider: John Presley MD    Anesthesia Type: MAC, general, TIVA ASA Status: 4 - Emergent          Anesthesia Type: No value filed.     Gracy Phase I: Gracy Score: 9    Gracy Phase II:        Anesthesia Post Evaluation    Patient location during evaluation: PACU  Patient participation: complete - patient participated  Level of consciousness: awake  Airway patency: patent  Nausea & Vomiting: no nausea  Complications: no  Cardiovascular status: blood pressure returned to baseline  Respiratory status: acceptable  Hydration status: euvolemic  Comments: Multimodal analgesia pain management as indicated by procedure  Multimodal analgesia pain management approach

## 2022-09-23 NOTE — OP NOTE
Operative Note      Patient: Brad Cardoso  YOB: 1950  MRN: [id number]    Date of Procedure: 9/23/2022    Pre-Op Diagnosis: Ischemic rest pain left leg    Post-Op Diagnosis: Same       Procedure:  1. Bilateral ultrasound-guided access common femoral arteries  2. Aortogram with pelvic runoff  3. Atherectomy of left limb of aortobifemoral bypass graft  4. Stenting of the proximal left limb of aortobifemoral bypass with angioplasty    Surgeon:  MD Gurjit Rao    Assistant:   * No surgical staff found *    Anesthesia: Versed 2mg, Fentanyl 50mcg    Estimated Blood Loss (mL): Minimal    Complications: None    Specimens:   * No specimens in log *    Implants:  * No implants in log *      Drains: * No LDAs found *    Findings:   1) patent right renal artery and proximal bypass graft main body and right limb. Left limb occludes at the bifurcation of bypass    Detailed Description of Procedure: This is a 82-year-old female with a history of aortobifemoral bypass more than 20 years ago. She started developing ischemic rest pain of the left leg and her noninvasive studies showed a left WILBERTO of 0.33. She is brought to the Cath Lab and placed supine the groins were cleaned and prepped and sterile drapes were applied. Ultrasound was used to identify the left common femoral artery. Using a micropuncture needle and catheter access was gained and a 5 Khmer sheath was placed. Initially retrograde arteriogram showed the sheath to be in the native common femoral artery. It was withdrawn and a wire was advanced retrograde through the left limb of the bypass graft. Reentry was achieved into the native abdominal aorta at the level of the renal vessels. In order to take a arteriogram the right common femoral artery was accessed and a wire was guided into the limb into the abdominal aorta which was widely patent.   An aortogram showed a high-grade stenosis at the left proximal iliac limb just distal to
first embolectomy distally at about the level of the popliteal artery chronic tan-colored thrombus was removed and arterial backbleeding was observed. Multiple passes were made and a completion arteriogram was performed which showed a patent AT and primary runoff through the peroneal but there was definitely some evidence of spasm from passing the Ximena balloon. No discrete filling defects were observed. The arteriotomy was closed with interrupted 6-0 Prolene suture. Deep tissue layers were closed with 2-0 Vicryl and 3-0 Vicryl used to close the dermal layer. 4 Monocryl was used to close the skin. The procedure was tolerated well without complication.   The patient returned to surgical intensive care unit in stable condition    Electronically signed by Khris Polo MD on 9/23/2022 at 1:30 PM

## 2022-09-24 VITALS
BODY MASS INDEX: 30.3 KG/M2 | TEMPERATURE: 97.6 F | OXYGEN SATURATION: 94 % | SYSTOLIC BLOOD PRESSURE: 94 MMHG | RESPIRATION RATE: 17 BRPM | WEIGHT: 171 LBS | HEART RATE: 92 BPM | DIASTOLIC BLOOD PRESSURE: 48 MMHG | HEIGHT: 63 IN

## 2022-09-24 LAB
ANTI-XA UNFRAC HEPARIN: 0.4 IU/L (ref 0.3–0.7)
ANTI-XA UNFRAC HEPARIN: 0.61 IU/L (ref 0.3–0.7)

## 2022-09-24 PROCEDURE — 97116 GAIT TRAINING THERAPY: CPT

## 2022-09-24 PROCEDURE — 6370000000 HC RX 637 (ALT 250 FOR IP): Performed by: SURGERY

## 2022-09-24 PROCEDURE — 2700000000 HC OXYGEN THERAPY PER DAY

## 2022-09-24 PROCEDURE — 97161 PT EVAL LOW COMPLEX 20 MIN: CPT

## 2022-09-24 PROCEDURE — 97166 OT EVAL MOD COMPLEX 45 MIN: CPT

## 2022-09-24 PROCEDURE — 6360000002 HC RX W HCPCS: Performed by: SURGERY

## 2022-09-24 PROCEDURE — 94640 AIRWAY INHALATION TREATMENT: CPT

## 2022-09-24 PROCEDURE — 2580000003 HC RX 258: Performed by: SURGERY

## 2022-09-24 PROCEDURE — 36415 COLL VENOUS BLD VENIPUNCTURE: CPT

## 2022-09-24 PROCEDURE — 97530 THERAPEUTIC ACTIVITIES: CPT

## 2022-09-24 PROCEDURE — 85520 HEPARIN ASSAY: CPT

## 2022-09-24 PROCEDURE — 94761 N-INVAS EAR/PLS OXIMETRY MLT: CPT

## 2022-09-24 RX ORDER — OXYCODONE HYDROCHLORIDE AND ACETAMINOPHEN 5; 325 MG/1; MG/1
1 TABLET ORAL EVERY 6 HOURS PRN
Qty: 20 TABLET | Refills: 0 | Status: SHIPPED | OUTPATIENT
Start: 2022-09-24 | End: 2022-09-29

## 2022-09-24 RX ADMIN — LEVOTHYROXINE SODIUM 50 MCG: 0.05 TABLET ORAL at 05:59

## 2022-09-24 RX ADMIN — OXYCODONE HYDROCHLORIDE AND ACETAMINOPHEN 1 TABLET: 5; 325 TABLET ORAL at 04:10

## 2022-09-24 RX ADMIN — CETIRIZINE HYDROCHLORIDE 10 MG: 10 TABLET, FILM COATED ORAL at 09:18

## 2022-09-24 RX ADMIN — FOLIC ACID 1 MG: 1 TABLET ORAL at 09:17

## 2022-09-24 RX ADMIN — SODIUM CHLORIDE, PRESERVATIVE FREE 10 ML: 5 INJECTION INTRAVENOUS at 09:17

## 2022-09-24 RX ADMIN — ASPIRIN 81 MG: 81 TABLET, COATED ORAL at 09:18

## 2022-09-24 RX ADMIN — KETOROLAC TROMETHAMINE 15 MG: 30 INJECTION, SOLUTION INTRAMUSCULAR at 10:35

## 2022-09-24 RX ADMIN — ROFLUMILAST 500 MCG: 500 TABLET ORAL at 09:17

## 2022-09-24 RX ADMIN — BUDESONIDE AND FORMOTEROL FUMARATE DIHYDRATE 1 PUFF: 160; 4.5 AEROSOL RESPIRATORY (INHALATION) at 08:09

## 2022-09-24 RX ADMIN — KETOROLAC TROMETHAMINE 15 MG: 30 INJECTION, SOLUTION INTRAMUSCULAR at 05:59

## 2022-09-24 RX ADMIN — RIVAROXABAN 20 MG: 20 TABLET, FILM COATED ORAL at 09:18

## 2022-09-24 RX ADMIN — PREGABALIN 150 MG: 75 CAPSULE ORAL at 09:16

## 2022-09-24 ASSESSMENT — PAIN SCALES - GENERAL
PAINLEVEL_OUTOF10: 0
PAINLEVEL_OUTOF10: 5
PAINLEVEL_OUTOF10: 5
PAINLEVEL_OUTOF10: 0
PAINLEVEL_OUTOF10: 5

## 2022-09-24 ASSESSMENT — PAIN DESCRIPTION - LOCATION
LOCATION: GROIN
LOCATION: GROIN
LOCATION: NECK;GROIN

## 2022-09-24 ASSESSMENT — PAIN DESCRIPTION - ORIENTATION
ORIENTATION: LEFT
ORIENTATION: RIGHT;LEFT
ORIENTATION: LEFT

## 2022-09-24 ASSESSMENT — PAIN DESCRIPTION - DESCRIPTORS: DESCRIPTORS: ACHING

## 2022-09-24 NOTE — PLAN OF CARE
Problem: Respiratory - Adult  Goal: Achieves optimal ventilation and oxygenation  9/23/2022 2132 by Henrietta Couch RCP  Outcome: Progressing     Problem: Respiratory - Adult  Goal: Able to breathe comfortably  Outcome: Progressing     Problem: Respiratory - Adult  Goal: Patient's breath sounds will be clear and equal  Outcome: Progressing     Problem: Respiratory - Adult  Goal: Adequate oxygenation  Description: Adequate oxygenation  Outcome: Progressing      BRONCHOSPASM/BRONCHOCONSTRICTION    IMPROVE  AERATION/BREATHSOUNDS  ADMINISTER BRONCHODILATOR THERAPY AS APPROPRIATE  ASSESS BREATH SOUNDS  PATIENT EDUCATION AS NEEDED

## 2022-09-24 NOTE — PROGRESS NOTES
Occupational Therapy  Facility/Department: [facility]  Occupational Therapy Initial Assessment    Name: Kristen [place]  : 1950  MRN: [id number]  Date of Service: 2022    ROSIO Morse reports patient is medically stable for therapy treatment this date. Chart reviewed prior to treatment and patient is agreeable for therapy. All lines intact and patient positioned comfortably at end of treatment. All patient needs addressed prior to ending therapy session. Due to recent hospitalization and medical condition, pt would benefit from additional intermittent skilled therapy at time of discharge. Please refer to the AM-PAC score for current functional status. Discharge Recommendations:  Patient would benefit from continued therapy after discharge  OT Equipment Recommendations  Equipment Needed: Yes  Mobility Devices: ADL Assistive Devices  ADL Assistive Devices: Reacher;Long-handled Sponge;Emergency Alert System;Long-handled Shoe Horn       Patient Diagnosis(es): The encounter diagnosis was History of embolectomy. Past Medical History:  has a past medical history of A-fib ([address] Anxiety, Arthritis, Blood transfusion reaction, CAD (coronary artery disease), Carotid stenosis, COPD (chronic obstructive pulmonary disease) ([address] GERD (gastroesophageal reflux disease), Hepatitis B, History of blood transfusion, Hyperlipidemia, Hypertension, Hypothyroid, Kidney disease, Legally blind, Macular edema, PVD (peripheral vascular disease) ([address] Sleep apnea, and Subclavian artery stenosis ([address] Past Surgical History:  has a past surgical history that includes Aorto-femoral Bypass Graft (); tumor removal (Left); Tonsillectomy; Toe amputation (); Cataract removal (Bilateral); Cholecystectomy (2014); Carotid endarterectomy; Cardiac catheterization (10/2012); eye surgery; eye surgery; Carotid endarterectomy (Left, 2019);  Carotid endarterectomy (Left, 2019); and vascular surgery (N/A, 9/23/2022). Assessment   Performance deficits / Impairments: Decreased functional mobility ; Decreased safe awareness;Decreased ADL status; Decreased endurance;Decreased strength;Decreased high-level IADLs;Decreased balance  Assessment: Skilled OT services are warranted at this time to address this pt's deficits in ADLs/functional mobility, maximize this pt's safety/IND when completing self care tasks and to facilitaet this pt's ability to return to OF as able. Prognosis: Fair  Decision Making: Medium Complexity  REQUIRES OT FOLLOW-UP: Yes  Activity Tolerance  Activity Tolerance: Patient Tolerated treatment well;Patient limited by fatigue        Plan   Plan  Times per Week: 4-5x/week 1x/day  Current Treatment Recommendations: Strengthening, ROM, Balance training, Functional mobility training, Safety education & training, Endurance training, Patient/Caregiver education & training, Self-Care / ADL, Home management training, Coordination training, Equipment evaluation, education, & procurement, Positioning     Restrictions  Restrictions/Precautions  Required Braces or Orthoses?: No    Subjective   General  Chart Reviewed: Yes  Patient assessed for rehabilitation services?: Yes  Family / Caregiver Present: No  Subjective  Subjective: Pt supine in bed upon arrival eating lunch, agreeable to therapy evaluation. Social/Functional History  Social/Functional History  Lives With: Other (comment) (grandson 31 yo present all of the time.  autistic)  Type of Home: Trailer  Home Layout: One level  Home Access: Stairs to enter with rails, Ramped entrance (2 modes of entrance per pt)  Entrance Stairs - Number of Steps: 3  Entrance Stairs - Rails: Both  Bathroom Shower/Tub: Walk-in shower  Bathroom Equipment: Shower chair, Hand-held shower  Bathroom Accessibility: Accessible  Home Equipment: Rollator, [address] bed, Wheelchair-electric  Has the patient had two or more falls in the past year or any fall with injury in the past year?: Yes (R humeral fx, fell out of the bathroom)  Receives Help From: Family  ADL Assistance: Needs assistance  Bath: Supervision  Dressing: Minimal assistance  Grooming: Independent  Feeding: Independent  Toileting: Independent  Homemaking Responsibilities: Yes  Active : No  Patient's  Info: family, transport service  Mode of Transportation: Car  Occupation: Retired       Objective   Observation/Palpation  Posture: Fair (While seated at [employer])  Observation: scabs along BUEs  Safety Devices  Type of Devices: All fall risk precautions in place; Bed alarm in place;Call light within reach;Gait belt;Left in bed;Patient at risk for falls;Nurse notified    Bed Mobility Training  Bed Mobility Training: Yes  Overall Level of Assistance: Minimum assistance; Additional time  Interventions: Safety awareness training; Tactile cues; Verbal cues (Min VCs provided for use of bedrails, sequencing of weightshift to EOB and HOB, and line awareness all to increase safety.)  Rolling: Minimum assistance;Assist X1  Supine to Sit: Minimum assistance; Additional time  Sit to Supine: Minimum assistance; Additional time  Scooting: Stand-by assistance (To scoot to [facility] while in supine, Max verbal/tactile cues provided for hand and foot placement/use of bedrails.)    Balance  Sitting:  (SBA while seated EOB)  Standing:  (CGA and RW)    Transfer Training  Transfer Training: Yes  Overall Level of Assistance: Contact-guard assistance; Additional time (Pt able to completed STS transfer with CGA and RW. Pt moved at a slow place throughout, requiring additional time to complete all mobilty tasks.)  Interventions: Safety awareness training; Tactile cues; Verbal cues (Min VCs provided for RW safety, hand placement on stable surfaces, controlled descent, and upright posture all to increase safety/reduce risk of falls.)  Sit to Stand: Contact-guard assistance; Additional time  Stand to Sit: Contact-guard assistance; Additional time    Functional Mobility  Overall Level of Assistance: Contact-guard assistance; Additional time (Pt completed functional mobility from EOB>door>EOB with RW and CGA. Pt moved slowly throughout, requiring additional time to complete task.)  Interventions: Safety awareness training;Verbal cues; Visual cues (Mod verbal/visual cues provided for upright posture, maintaining ESTHER within RW, RW safety/mgnt, scanning environment, and squaring self to transfer surface, all to increase safety/reduce risk of falls.)  Assistive Device: Gait belt;Walker, rolling     AROM: Generally decreased, functional (R shoulder AROM limited, pt reports hx of broken shoulder)  Strength: Generally decreased, functional (BUE ~4+/5; R shoulder MMT not tested d/t limited ROM)  Coordination: Generally decreased, functional  Tone: Normal    ADL  Feeding: Setup  Feeding Skilled Clinical Factors: set up assist required for items on lunch tray d/t decreased fine motor control  Grooming: Stand by assistance;Setup (seated position)  UE Bathing: Stand by assistance;Setup  LE Bathing: Minimal assistance  UE Dressing: Minimal assistance  LE Dressing: Minimal assistance  Toileting: Minimal assistance  Additional Comments: Pt's participation in ADLs limited secondary to decreased functional activity tolerance/fatigue, some mild weakness in BUEs, and decreased safety awareness. Vision  Vision: Impaired  Vision Exceptions: Wears glasses at all times; Legally blind  Hearing  Hearing: Exceptions to [facility]  Hearing Exceptions: Bilateral hearing aid (doesn't have them with her)    Cognition  Overall Cognitive Status: WFL  Arousal/Alertness: Appropriate responses to stimuli  Following Commands:  Follows multistep commands with repitition  Attention Span: Appears intact  Memory: Appears intact  Safety Judgement: Decreased awareness of need for safety  Problem Solving: Decreased awareness of errors;Assistance required to identify errors made;Assistance required to correct errors made  Insights: Decreased awareness of deficits  Initiation: Does not require cues  Sequencing: Requires cues for some  Orientation  Overall Orientation Status: Within Functional Limits                  Education Given To: Patient  Education Provided: Role of Therapy;Transfer Training;Equipment;Plan of Care; Fall Prevention Strategies;Precautions; Energy Conservation  Education Provided Comments: role of OT in hospital, importance of OOB activity, OT POC, RW safety and mgnt, fall prevention strategies, bed mobility and ADL transfer tech, safety in function, and call light use  Education Method: Verbal  Barriers to Learning: None  Education Outcome: Verbalized understanding;Continued education needed         AM-PAC Score        AM-PAC Inpatient Daily Activity Raw Score: 18 (09/24/22 1402)  AM-PAC Inpatient ADL T-Scale Score : 38.66 (09/24/22 1402)  ADL Inpatient CMS 0-100% Score: 46.65 (09/24/22 1402)  ADL Inpatient CMS G-Code Modifier : CK (09/24/22 1402)    Goals  Short Term Goals  Time Frame for Short term goals: By discharge, pt to demo:  Short Term Goal 1: bed mobility tasks with SUP and use of bedrails/bed controls as needed. Short Term Goal 2: ADL transfers and functional mobility tasks with SUP and Good safety with use of AD as needed. Short Term Goal 3: UB ADLs to set up assist and LB ADLs to SBA and use of AD/AE as needed. Short Term Goal 4: toileting tasks with SBA and use of AD/grab bars/BSC as needed. Short Term Goal 5: increased BUE strength by 1/2 grade to promote strength required for safety/IND with self care tasks. Long Term Goals  Long Term Goal 1: Pt/caregiver to be IND with fall prevention strategies, EC/WS tech, discharge/equipment recommendations, and a BUE HEP with use of handout as needed. Patient Goals   Patient goals : To get stronger!        Therapy Time   Individual Concurrent Group Co-treatment   Time In 1218         Time Out 3099 Minutes 34          Tx Time: 12 minutes       Brenton Drape, OT

## 2022-09-24 NOTE — FLOWSHEET NOTE
09/24/22 1403   Treatment Team Notification   Reason for Communication Evaluate; Review case   Team Member Name Dr.DeLos-Reyes   Treatment Team Role Attending Provider   Method of Communication Secure Message   Response No new orders   Notification Time [phone]   MD notified of PT recommendation to home. MD tipton sign ROSE MARIE for homecare,discharge home today, script in chart.

## 2022-09-24 NOTE — PLAN OF CARE
Problem: Pain  Goal: Verbalizes/displays adequate comfort level or baseline comfort level  Outcome: Progressing     Problem: Respiratory - Adult  Goal: Achieves optimal ventilation and oxygenation  9/24/2022 0443 by Oscar Jorgensen RN  Outcome: Progressing  9/23/2022 2132 by Cary Osman RCP  Outcome: Progressing  9/23/2022 1549 by Sergio Acuña RCP  Outcome: Progressing  9/23/2022 1549 by Sergio Acuña RCP  Flowsheets (Taken 9/23/2022 1549)  Achieves optimal ventilation and oxygenation:   Assess for changes in respiratory status   Respiratory therapy support as indicated  Note: BRONCHOSPASM/BRONCHOCONSTRICTION    IMPROVE  AERATION/BREATHSOUNDS  ADMINISTER BRONCHODILATOR THERAPY AS APPROPRIATE  ASSESS BREATH SOUNDS  PATIENT EDUCATION AS NEEDED   Goal: Able to breathe comfortably  9/23/2022 2132 by Cary Osman RCP  Outcome: Progressing  Goal: Patient's breath sounds will be clear and equal  9/23/2022 2132 by Cary Osman RCP  Outcome: Progressing  Goal: Adequate oxygenation  Description: Adequate oxygenation  9/23/2022 2132 by Cary Osman RCP  Outcome: Progressing

## 2022-09-24 NOTE — FLOWSHEET NOTE
Discharge Note:      All discharge instructions given at this time as well as all patient belongings returned to patient. Pt denies any further questions regarding discharge at this time. Pt given also given discharge packet with unit letter, discharge instructions/restrictions and medication handouts regarding all discharge medications and side effects. Pt also given phone number for home care. Paper script for percocet given to patient. [phone] pt discharged & wheeled to ER doors where patient left in private vehicle with daughter. All belongings with patient at time of discharge.

## 2022-09-24 NOTE — FLOWSHEET NOTE
09/24/22 0659   Treatment Team Notification   Reason for Communication Evaluate; Review case   Team Member Name Dr.De-Los [facility] Team Role Attending Provider   Method of Communication Face to face   Response In department   Notification Time 0700     May ambulate & encourage. Heparin gtt dc'd, start xarelto. See how she does today.

## 2022-09-24 NOTE — PLAN OF CARE
Problem: Respiratory - Adult  Goal: Able to breathe comfortably  9/24/2022 0812 by Elisa Tapia RCP  Outcome: Progressing  Goal: Patient's breath sounds will be clear and equal  9/24/2022 0812 by Elisa Tapia RCP  Outcome: Progressing  Goal: Adequate oxygenation  Description: Adequate oxygenation  9/24/2022 0812 by Elisa Tapia RCP  Outcome: Progressing     Problem: Discharge Planning  Goal: Discharge to home or other facility with appropriate resources  9/24/2022 1643 by Janiya Ritchie RN  Outcome: Completed  9/24/2022 0443 by Dorothy Lucia RN  Outcome: Progressing     Problem: Pain  Goal: Verbalizes/displays adequate comfort level or baseline comfort level  9/24/2022 1643 by Janiya Ritchie RN  Outcome: Completed  9/24/2022 0443 by Dorothy Lucia RN  Outcome: Progressing     Problem: Respiratory - Adult  Goal: Achieves optimal ventilation and oxygenation  9/24/2022 1643 by Janiya Ritchie RN  Outcome: Completed  9/24/2022 0812 by Mushtaq Cortez RCP  Outcome: Progressing  9/24/2022 0443 by Dorothy Lucia RN  Outcome: Progressing     Problem: Safety - Medical Restraint  Goal: Remains free of injury from restraints (Restraint for Interference with Medical Device)  Description: INTERVENTIONS:  1. Determine that other, less restrictive measures have been tried or would not be effective before applying the restraint  2. Evaluate the patient's condition at the time of restraint application  3. Inform patient/family regarding the reason for restraint  4. Q2H: Monitor safety, psychosocial status, comfort, nutrition and hydration  9/24/2022 1643 by Janiya Ritcihe RN  Outcome: Completed  9/24/2022 0443 by Dorothy Lucia RN  Outcome: Progressing     Problem: Skin/Tissue Integrity  Goal: Absence of new skin breakdown  Description: 1. Monitor for areas of redness and/or skin breakdown  2. Assess vascular access sites hourly  3. Every 4-6 hours minimum:  Change oxygen saturation probe site  4. Every 4-6 hours:  If on nasal continuous positive airway pressure, respiratory therapy assess nares and determine need for appliance change or resting period.   9/24/2022 1643 by Erwin Ruiz RN  Outcome: Completed  9/24/2022 0443 by Neville Miller RN  Outcome: Progressing     Problem: Safety - Adult  Goal: Free from fall injury  9/24/2022 1643 by Erwin Ruiz RN  Outcome: Completed  9/24/2022 0443 by Neville Miller RN  Outcome: Progressing     Problem: ABCDS Injury Assessment  Goal: Absence of physical injury  9/24/2022 1643 by Erwin Ruiz RN  Outcome: Completed  9/24/2022 0443 by Neville Miller RN  Outcome: Progressing

## 2022-09-24 NOTE — CARE COORDINATION
09/24/22 0983   Service Assessment   Patient Orientation Alert and Oriented;Person;Place;Situation   Cognition Alert   History Provided By Patient   Primary Caregiver Self  (lives with son)   [address] is: Legal Next of Krystina Wang   PCP Verified by CM Yes   Last Visit to PCP Within last 3 months   Prior Functional Level Assistance with the following:;Cooking;Housework; Shopping   Can patient return to prior living arrangement Unknown at present   Ability to make needs known: Fair   Family able to assist with home care needs: Yes   Would you like for me to discuss the discharge plan with any other family members/significant others, and if so, who? No   Social/Functional History   Lives With Other (comment)  (grandson)   Type of [address] [address] One level   Home Access Ramped entrance   [address] [place] [address]; Wheelchair-manual;Hospital bed   Receives Help From Family   ADL Assistance Needs assistance   Rahat Chavira Supervision   Dressing Minimal assistance   Grooming Independent   Feeding Independent   Toileting Independent   Homemaking Responsibilities No   Active  No   Patient's  Info family   Mode of Transportation Car   Occupation Retired   Discharge Planning   Type of Residence Trailer/Mobile Home   Living Arrangements Family Members   Current Services Prior To Admission None   Potential Assistance Needed Other (Comment)  (PT/OT to eval.)   DME Ordered? No   Type of Home Care Services None   Patient expects to be discharged to: Unknown   Follow Up Appointment: Best Day/Time  Monday AM   One/Two Story Residence One story   History of falls? 0   Services At/After Discharge   Services At/After Discharge Home Health;Skilled [address] (SNF)   [employer] Information Provided?  No   Mode of Transport at Discharge   (family)   Confirm Follow Up Transport Family   See's Dr/ Randolm Bosworth. Drug store in [address] PT/OT to [place]wn. Cont' to follow.

## 2022-09-24 NOTE — PROGRESS NOTES
Physical Therapy  [facility]  Physical Therapy Initial Assessment    Name: Kristen [place]  : 1950  MRN: [id number]  Date of Service: 2022    Discharge Recommendations:  Patient would benefit from continued therapy after discharge   PT Equipment Recommendations  Equipment Needed: Yes  Mobility Devices: Ronal Chester: Rolling (she has a rollator, but a RW would give her more stability and thus, safer.)      Patient Diagnosis(es): The encounter diagnosis was History of embolectomy. Past Medical History:  has a past medical history of A-fib ([address] Anxiety, Arthritis, Blood transfusion reaction, CAD (coronary artery disease), Carotid stenosis, COPD (chronic obstructive pulmonary disease) ([address] GERD (gastroesophageal reflux disease), Hepatitis B, History of blood transfusion, Hyperlipidemia, Hypertension, Hypothyroid, Kidney disease, Legally blind, Macular edema, PVD (peripheral vascular disease) ([address] Sleep apnea, and Subclavian artery stenosis ([address] Past Surgical History:  has a past surgical history that includes Aorto-femoral Bypass Graft (); tumor removal (Left); Tonsillectomy; Toe amputation (); Cataract removal (Bilateral); Cholecystectomy (2014); Carotid endarterectomy; Cardiac catheterization (10/2012); eye surgery; eye surgery; Carotid endarterectomy (Left, 2019); Carotid endarterectomy (Left, 2019); and vascular surgery (N/A, 2022). Date of Procedure: 2022     Pre-Op Diagnosis: Ischemic rest pain left leg     Post-Op Diagnosis: Same       Procedure:  1. Bilateral ultrasound-guided access common femoral arteries  2. Aortogram with pelvic runoff  3. Atherectomy of left limb of aortobifemoral bypass graft  4. Stenting of the proximal left limb of aortobifemoral bypass with angioplasty     Assessment   Body Structures, Functions, Activity Limitations Requiring Skilled Therapeutic Intervention: Decreased functional mobility ; Decreased strength;Decreased endurance  Therapy Prognosis: Good  Decision Making: Medium Complexity  Requires PT Follow-Up: Yes  Activity Tolerance  Activity Tolerance: Patient tolerated treatment well  Activity Tolerance Comments: Considering the RN said that she needed mod-max assist earlier, the pt required only min A to perform bed mobility, transfers, and ambulation. Plan   Plan  Plan: 1 time a day 3-6 times a week (although she will most likely be DC'd to home today)  Plan weeks: 12 visits  Current Treatment Recommendations: Strengthening, Functional mobility training, Transfer training, Gait training, Stair training, Home exercise program, Patient/Caregiver education & training, Safety education & training  Safety Devices  Type of Devices: All fall risk precautions in place, Bed alarm in place, Call light within reach, Gait belt, Left in bed, Patient at risk for falls, Nurse notified     Restrictions  Restrictions/Precautions  Required Braces or Orthoses?: No     Subjective   General  Chart Reviewed: Yes  Patient assessed for rehabilitation services?: Yes  Response To Previous Treatment: Not applicable  Family / Caregiver Present: No  Follows Commands: Within Functional Limits  Other (Comment): Joann Metzger approved for therapy. per MD, his expectation is for the pt to be DC'd to home. PT/OT to assess  Subjective  Subjective: pt was agreeable to PT         Social/Functional History  Social/Functional History  Lives With: Other (comment) (grandson 33 yo present all of the time.  autistic)  Type of Home: Trailer  Home Layout: One level  Home Access: Ramped entrance (2 modes of entry per pt)  Entrance Stairs - Number of Steps: 3  Entrance Stairs - Rails: Both  Bathroom Shower/Tub: Walk-in shower  Bathroom Equipment: Shower chair, Hand-held shower  Bathroom Accessibility: Accessible  Home Equipment: Rollator, [address] bed, Wheelchair-electric  Has the patient had two or more falls in the past year or any fall with injury in the past year?: Yes (R humeral fx, fell out of the bathroom)  Receives Help From: Family  ADL Assistance: Needs assistance  Bath: Supervision  Dressing: Minimal assistance  Grooming: Independent  Feeding: Independent  Toileting: Independent  Homemaking Responsibilities: Yes  Active : No  Patient's  Info: family, transport service  Mode of Transportation: Car  Occupation: Retired  Vision/Hearing  Vision  Vision: Impaired  Vision Exceptions: Wears glasses at all times; Legally blind  Hearing  Hearing: Exceptions to [facility]  Hearing Exceptions: Bilateral hearing aid (doesn't have them with her)    Cognition   Orientation  Overall Orientation Status: Within Functional Limits  Cognition  Overall Cognitive Status: WFL     Objective   Heart Rate: 92  Heart Rate Source: Monitor  BP: (!) 94/48  BP Location: Left upper arm  BP Method: Automatic  Patient Position: Semi fowlers  MAP (Calculated): 63.33  Resp: 17  SpO2: 94 %  O2 Device: Nasal cannula     Observation/Palpation  Posture: Good  Gross Assessment  AROM: Generally decreased, functional  Strength: Generally decreased, functional  Tone:  (tremor noted in L foot and she says is \"numbish\")  Sensation: Impaired (L foot is \"numbish\")     AROM RLE (degrees)  RLE AROM: WFL  AROM LLE (degrees)  LLE AROM : WFL  AROM RUE (degrees)  RUE General AROM: see OT eval  AROM LUE (degrees)  LUE General AROM: see OT eval  Strength RLE  Strength RLE: WFL  Comment: grossly 5/5  Strength LLE  Strength LLE: WFL  Comment: grossly 5/5  Strength RUE  Comment: see OT eval  Strength LUE  Comment: see OT eval        Bed Mobility Training  Bed Mobility Training: Yes  Overall Level of Assistance: Adaptive equipment; Additional time;Minimum assistance;Assist X2  Interventions: Verbal cues; Tactile cues; Safety awareness training  Rolling: Minimum assistance;Assist X1  Supine to Sit: Minimum assistance; Adaptive equipment; Additional time  Sit to Supine: Minimum assistance; Additional time; Adaptive equipment  Scooting: Minimum assistance  Balance  Sitting: Without support  Standing: With support  Transfer Training  Transfer Training: Yes  Overall Level of Assistance: Contact-guard assistance; Adaptive equipment; Additional time  Interventions: Verbal cues; Tactile cues  Sit to Stand: Additional time; Adaptive equipment;Contact-guard assistance  Stand to Sit: Contact-guard assistance; Additional time; Adaptive equipment  Bed mobility  Bridging: Minimal assistance  Rolling to Left: Minimal assistance  Rolling to Right: Minimal assistance  Supine to Sit: Minimal assistance  Sit to Supine: Minimal assistance  Scooting: Minimal assistance  Transfers  Sit to Stand: Minimal Assistance  Stand to sit: Minimal Assistance  Comment: needed verbal cues for proper technique and safety  Ambulation  Surface: level tile  Device: Rolling Walker  Assistance: Contact guard assistance  Gait Deviations: Slow Maya  Distance: 10'  More Ambulation?: No     Balance  Posture: Good  Sitting - Static: Good  Sitting - Dynamic: Good  Standing - Static: Good  Standing - Dynamic: Fair           OutComes Score                                                  AM-PAC Score     AM-PAC Inpatient Mobility without Stair Climbing Raw Score : 10 (09/24/22 1254)  AM-PAC Inpatient without Stair Climbing T-Scale Score : 34.07 (09/24/22 1254)  Mobility Inpatient CMS 0-100% Score: 71.66 (09/24/22 1254)  Mobility Inpatient without Stair CMS G-Code Modifier : CL (09/24/22 1254)       Tinneti Score       Goals  Short Term Goals  Time Frame for Short term goals: 12 visits  Short term goal 1: to be participate in PT for up to 30 min  Short term goal 2: to be independent with bed mobility  Short term goal 3: to be independent with transfers  Short term goal 4: to be SBA for ambulation and RW  Additional Goals?: No       Education  Patient Education  Education Given To: Patient  Education Provided: Role of Therapy; Fall Prevention Strategies;Transfer Training  Education Method: Verbal;Demonstration  Barriers to Learning: None  Education Outcome: Verbalized understanding      Therapy Time   Individual Concurrent Group Co-treatment   Time In 6442         Time Out 8546         Minutes 26 + 10min for chart review              Co-treatment with OT warranted secondary to decreased safety and independence requiring 2 skilled therapy professionals to address individual discipline's goals. PT addressing    and transfer training.       Andrzej Garcia, PT

## 2022-09-24 NOTE — PLAN OF CARE
Problem: Respiratory - Adult  Goal: Achieves optimal ventilation and oxygenation  9/24/2022 0812 by Rudi Tapia RCP  Outcome: Progressing  9/24/2022 0443 by Krishna Torres RN  Outcome: Progressing  9/23/2022 2132 by Evelyn Chiu RCP  Outcome: Progressing  Goal: Able to breathe comfortably  9/24/2022 0812 by Khadijah Hopkins RCP  Outcome: Progressing  9/23/2022 2132 by Evelyn Chiu RCP  Outcome: Progressing  Goal: Patient's breath sounds will be clear and equal  9/24/2022 0812 by Khadijah Hopkins RCP  Outcome: Progressing  9/23/2022 2132 by Evelyn Chiu RCP  Outcome: Progressing  Goal: Adequate oxygenation  Description: Adequate oxygenation  9/24/2022 0812 by Khadijah Hopkins RCP  Outcome: Progressing  9/23/2022 2132 by Evelyn Chiu RCP  Outcome: Progressing

## 2022-09-24 NOTE — DISCHARGE INSTR - COC
Continuity of Care Form    Patient Name: Ashley Carey   :  1950  MRN:  [id number]    Admit date:  2022  Discharge date:  22    Code Status Order: Full Code   Advance Directives:   Advance Care Flowsheet Documentation       Date/Time Healthcare Directive Type of Healthcare Directive Copy in [address] Agent's Name Healthcare Agent's Phone Number    22 0809 No, patient does not have an advance directive for healthcare treatment -- -- -- -- --            Admitting Physician:  Velma Eldridge MD  PCP: Tammy Lobato MD    Discharging Nurse: [facility] Unit/Room#:   Discharging Unit Phone Number: [phone]    Emergency Contact:   Extended Emergency Contact Information  Primary Emergency Contact:  Alvin Dykes [employer]aber Phone: [phone]  Relation: Child  Secondary Emergency Contact: [address] Phone: [phone]  Relation: Child    Past Surgical History:  Past Surgical History:   Procedure Laterality Date    AORTO-FEMORAL BYPASS GRAFT      CARDIAC CATHETERIZATION  10/2012    no stents    CAROTID ENDARTERECTOMY      CAROTID ENDARTERECTOMY Left 2019    CAROTID ENDARTERECTOMY LEFT (Left Neck)    CAROTID ENDARTERECTOMY Left 2019    CAROTID ENDARTERECTOMY LEFT performed by Velma Eldridge MD at [facility] Bilateral     CHOLECYSTECTOMY  2014    laparscopic    EYE SURGERY      LASIK LEFT EYE    EYE SURGERY      catarat extraction    TOE AMPUTATION      gangrene     TONSILLECTOMY      TUMOR REMOVAL Left     carotid    VASCULAR SURGERY N/A 2022    FEMORAL EMBOLECTOMY THROMBECTOMY performed by Velma Eldridge MD at [facility]       Immunization History:   Immunization History   Administered Date(s) Administered    COVID-19, MODERNA BLUE border, Primary or Immunocompromised, (age 12y+), IM, 100 mcg/0.5mL 2021, 2021    Influenza A (N6C2-55) Vaccine PF IM 2010 Influenza Virus Vaccine 09/26/2012, 09/24/2013    Influenza, FLUARIX, FLULAVAL, [place] (age 10 mo+) AND AFLURIA, (age 1 y+), PF, 0.5mL 11/23/2015, 11/21/2017    Influenza, FLUZONE (age 72 y+), High Dose, 0.7mL 09/19/2020    Influenza, High Dose (Fluzone 65 yrs and older) 09/19/2018, 11/26/2019    Pneumococcal Conjugate 13-valent (Iksocft88) 09/19/2018    Pneumococcal Polysaccharide (Eykitlajp31) 09/26/2012       Active Problems:  Patient Active Problem List   Diagnosis Code    S/P laparoscopic cholecystectomy Z90.49    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    S/P carotid endarterectomy Z98.890    Disorder of carotid artery (HCC) I77.9    Disorder of lung J98.4    Kidney disorder N28.9    Vascular disorder I99.9    PNA (pneumonia) J18.9    History of embolectomy Z98.890       Isolation/Infection:   Isolation            No Isolation          Patient Infection Status       Infection Onset Added Last Indicated Last Indicated By Review Planned Expiration Resolved Resolved By    None active    Resolved    COVID-19 (Rule Out) 10/07/21 10/07/21 10/07/21 COVID-19 (Ordered)   10/07/21 Rule-Out Test Resulted            Nurse Assessment:  Last Vital Signs: BP (!) 94/48   Pulse 92   Temp 97.6 °F (36.4 °C) (Temporal)   Resp 17   Ht 5' 3\" (1.6 m)   Wt 171 lb (77.6 kg)   SpO2 94%   BMI 30.29 kg/m²     Last documented pain score (0-10 scale): Pain Level: 0  Last Weight:   Wt Readings from Last 1 Encounters:   09/24/22 171 lb (77.6 kg)     Mental Status:  oriented, alert, and coherent    IV Access:  - None    Nursing Mobility/ADLs:  Walking   Assisted  Transfer  Assisted  Bathing  Assisted  Dressing  Assisted  Toileting  Assisted  Feeding  Independent  Med Admin  Assisted  Med Delivery   whole    Wound Care Documentation and Therapy:  Incision 09/23/22 Groin Anterior; Left (Active)   Dressing Status Clean;Dry; Intact 09/24/22 1200   Incision Cleansed Not Cleansed 09/24/22 1200   Dressing/Treatment Dry dressing;Tegaderm/transparent film dressing 09/24/22 1200   Closure Other (Comment) 09/24/22 1200   Incision Assessment Other (Comment) 09/24/22 1200   Drainage Amount None 09/24/22 1200   Odor None 09/24/22 1200   Paty-incision Assessment Intact 09/24/22 1200   Number of days: 1       Incision 09/23/22 Groin Anterior;Right (Active)   Dressing Status Clean;Dry; Intact 09/24/22 1200   Incision Cleansed Not Cleansed 09/24/22 1200   Dressing/Treatment Dry dressing;Tegaderm/transparent film dressing 09/24/22 1200   Closure Other (Comment) 09/24/22 1200   Incision Assessment Other (Comment) 09/24/22 1200   Drainage Amount None 09/24/22 1200   Odor None 09/24/22 1200   Paty-incision Assessment Intact 09/24/22 1200   Number of days: 0        Elimination:  Continence: Bowel: Yes  Bladder: Yes  Urinary Catheter: None   Colostomy/Ileostomy/Ileal Conduit: {YES / QL:83124}       Date of Last BM: 9/23/22    Intake/Output Summary (Last 24 hours) at 9/24/2022 1407  Last data filed at 9/24/2022 1056  Gross per 24 hour   Intake 1695.96 ml   Output --   Net 1695.96 ml     I/O last 3 completed shifts: In: 2016 [P.O.:100; I.V.:1916]  Out: 30 [Blood:30]    Safety Concerns: At Risk for Falls    Impairments/Disabilities:      Vision    Nutrition Therapy:  Current Nutrition Therapy:   - Oral Diet:  General    Routes of Feeding: Oral  Liquids: No Restrictions  Daily Fluid Restriction: no  Last Modified Barium Swallow with Video (Video Swallowing Test): {Done Not Done [facility]:[id number]}    Treatments at the Time of Hospital Discharge:   Respiratory Treatments: ***  Oxygen Therapy:  is not on home oxygen therapy. Ventilator:    - No ventilator support    Rehab Therapies: Physical Therapy and Occupational Therapy  Weight Bearing Status/Restrictions: No weight bearing restrictions  Other Medical Equipment (for information only, NOT a DME order):  wheelchair and hospital bed  Other Treatments: Skilled RN assessment, and Aide services. Patient's personal belongings (please select all that are sent with patient):  Glasses    RN SIGNATURE:  Electronically signed by Virgie Mg RN on 9/24/22 at 3:07 PM EDT    CASE MANAGEMENT/SOCIAL WORK SECTION    Inpatient Status Date: ***    Readmission Risk Assessment Score:  Readmission Risk              Risk of Unplanned Readmission:  12           Discharging to Facility/ Agency   Name:[facility]        Address   [address] [place]            / signature: Electronically signed by Ubaldo Morris RN on 9/24/22 at 3:46 PM EDT    PHYSICIAN SECTION    Prognosis: Good    Condition at Discharge: Stable    Rehab Potential (if transferring to Rehab): Good    Recommended Labs or Other Treatments After Discharge:     Physician Certification: I certify the above information and transfer of Chris Olmedo  is necessary for the continuing treatment of the diagnosis listed and that she requires Home Care for less 30 days.      Update Admission H&P: No change in H&P    PHYSICIAN SIGNATURE:  Electronically signed by Bishop Abel MD on 9/24/22 at 2:08 PM EDT

## 2022-09-24 NOTE — PROGRESS NOTES
Vascular Surgery   Progress Note    9/24/2022 6:49 AM  Subjective:   Admit Date: 9/23/2022  PCP: Michelle Cheek MD  Interval History: no pain overnight. Resting comfortably. Doppler signal at the ankle, weak signal in the PT    Diet: ADULT DIET; Regular    Medications:   Scheduled Meds:   folic acid  1 mg Oral Daily    losartan  50 mg Oral Daily    levothyroxine  50 mcg Oral QAM AC    [START ON 10/1/2022] cyanocobalamin  1,000 mcg IntraMUSCular Q30 Days    aspirin  81 mg Oral Daily    pregabalin  150 mg Oral BID    budesonide-formoterol  1 puff Inhalation BID    furosemide  20 mg Oral Daily    Roflumilast  500 mcg Oral Daily    amLODIPine  5 mg Oral Daily    cetirizine  10 mg Oral Daily    sodium chloride flush  5-40 mL IntraVENous 2 times per day    ketorolac  15 mg IntraVENous Q6H     Continuous Infusions:   heparin (PORCINE) Infusion 18 Units/kg/hr (09/24/22 0000)    sodium chloride           Labs:   CBC:   Recent Labs     09/23/22  0821 09/23/22  1204   WBC 10.5 12.5*   HGB 8.6* 9.2*    283     BMP:    Recent Labs     09/23/22  0821      K 3.9      CO2 23   BUN 11   CREATININE 1.05*   GLUCOSE 113*     Hepatic: No results for input(s): AST, ALT, ALB, BILITOT, ALKPHOS in the last 72 hours. Troponin: Invalid input(s): TROPONIN  BNP: No results for input(s): BNP in the last 72 hours. Lipids: No results for input(s): CHOL, HDL in the last 72 hours.     Invalid input(s): LDLCALCU  INR:   Recent Labs     09/23/22  1204   INR 1.2       Objective:   Vitals: BP (!) 94/48   Pulse 92   Temp 97.4 °F (36.3 °C) (Temporal)   Resp 17   Ht 5' 3\" (1.6 m)   Wt 171 lb (77.6 kg)   SpO2 94%   BMI 30.29 kg/m²   General appearance: alert, cooperative and no distress  Mental Status: oriented to person, place and time with normal affect  Neck: good carotid pulses, no JVD  Lungs: clear to auscultation bilaterally, normal effort  Heart: regular rate and rhythm, no murmur,  Abdomen: soft, non-tender, non-distended, bowel sounds present all four quadrants, no masses, hepatomegaly, splenomegaly or aortic enlargement  Extremities: no edema, erythema or tenderness in the calves  Doppler left PT (weak)) + AT biphasic.   No signal in the foot, but normal capillary refill  Skin: no gross lesions, rashes, or induration    Assessment:   73 yr old female s/p thrombectomy of left limb of aortic graft    Patient Active Problem List:     S/P laparoscopic cholecystectomy     Innominate artery stenosis (HCC)     S/P carotid endarterectomy     Disorder of carotid artery (HCC)     Disorder of lung     Kidney disorder     Vascular disorder     PNA (pneumonia)     History of embolectomy      Plan:   Transition to Xarelto  Activity as tolerated  Ok to discharge if patient feels comfortable     Electronically signed by Flower Rick MD on 9/24/2022 at 6:49 AM

## 2022-09-25 NOTE — DISCHARGE SUMMARY
Physician Discharge Summary     Patient ID:  Lainey Greco  [id number]  89 y.o.  1950    Admit date: 9/23/2022    Discharge date and time: 9/24/2022  4:50 PM     Admitting Physician: Chacho Kelly MD     Discharge Physician: Chacho Kelly MD      Admission Diagnoses: Intermittent claudication of left lower extremity due to atherosclerosis of bypass graft [facility]) [I70.312]  History of embolectomy [Z98.890]    Discharge Diagnoses: Left iliac occlusion    Admission Condition: good    Discharged Condition: good    Indication for Admission: Observation after surgery    Hospital Course: uncomplicated hospital course.    Remained on heparin overnight and transitioned back to Xarelto for afib    Consults: none    Significant Diagnostic Studies: angiography: left leg angio    Treatments: surgery: left femoral embolectomy    Discharge Exam:  BP (!) 94/48   Pulse 92   Temp 97.6 °F (36.4 °C) (Temporal)   Resp 17   Ht 5' 3\" (1.6 m)   Wt 171 lb (77.6 kg)   SpO2 94%   BMI 30.29 kg/m²     General Appearance:    Alert, cooperative, no distress, appears stated age   Head:    Normocephalic, without obvious abnormality, atraumatic   Eyes:    PERRL, conjunctiva/corneas clear, EOM's intact, fundi     benign, both eyes   Ears:    Normal TM's and external ear canals, both ears   Nose:   Nares normal, septum midline, mucosa normal, no drainage    or sinus tenderness   Throat:   Lips, mucosa, and tongue normal; teeth and gums normal   Neck:   Supple, symmetrical, trachea midline, no adenopathy;     thyroid:  no enlargement/tenderness/nodules; no carotid    bruit or JVD   Back:     Symmetric, no curvature, ROM normal, no CVA tenderness   Lungs:     Clear to auscultation bilaterally, respirations unlabored   Chest Wall:    No tenderness or deformity    Heart:    Regular rate and rhythm, S1 and S2 normal, no murmur, rub   or gallop       Abdomen:     Soft, non-tender, bowel sounds active all four quadrants,     no masses, no organomegaly           Extremities:   Extremities normal, atraumatic, no cyanosis or edema   Pulses:   Doppler AT, PT monophasic   Skin:   Skin color, texture, turgor normal, no rashes or lesions   Lymph nodes:   Cervical, supraclavicular, and axillary nodes normal   Neurologic:   CNII-XII intact, normal strength, sensation and reflexes     throughout       Disposition: home    In process/preliminary results:  Outstanding Order Results       No orders found from 8/25/2022 to 9/24/2022. Patient Instructions:   Discharge Medication List as of 9/24/2022  4:05 PM        START taking these medications    Details   oxyCODONE-acetaminophen (PERCOCET) 5-325 MG per tablet Take 1 tablet by mouth every 6 hours as needed for Pain for up to 5 days. , Disp-20 tablet, R-0Print           CONTINUE these medications which have NOT CHANGED    Details   XARELTO 20 MG TABS tablet Take 20 mg by mouth daily, DAWHistorical Med      Roflumilast (DALIRESP) 500 MCG tablet Take 500 mcg by mouth dailyHistorical Med      amLODIPine (NORVASC) 5 MG tablet Take 5 mg by mouth dailyHistorical Med      SYMBICORT 160-4.5 MCG/ACT AERO DAWHistorical Med      calcium carbonate 1500 (600 Ca) MG TABS tablet Historical Med      furosemide (LASIX) 40 MG tablet Take 20 mg by mouth dailyHistorical Med      pregabalin (LYRICA) 150 MG capsule Take 150 mg by mouth 2 times daily. Historical Med      alendronate (FOSAMAX) 35 MG tablet Take 35 mg by mouth every 7 daysHistorical Med      aspirin 81 MG EC tablet Take 81 mg by mouth dailyHistorical Med      varenicline (CHANTIX) 1 MG tablet Take 1 mg by mouth 2 times dailyHistorical Med      fexofenadine (ALLEGRA) 180 MG tablet Take 180 mg by mouth dailyHistorical Med      losartan (COZAAR) 50 MG tablet Take 50 mg by mouth dailyHistorical Med      levothyroxine (SYNTHROID) 50 MCG tablet Take 50 mcg by mouth Daily. Historical Med      omeprazole (PRILOSEC) 40 MG delayed release capsule Take 40 mg by mouth daily.Historical Med      albuterol (PROVENTIL HFA;VENTOLIN HFA) 108 (90 BASE) MCG/ACT inhaler Inhale 2 puffs into the lungs every 4 hours as needed for Wheezing. Historical Med      ALPRAZolam (XANAX) 1 MG tablet Take 1 mg by mouth 3 times daily as needed for Sleep or Anxiety. Carlitos Gutter Historical Med      cyanocobalamin 1000 MCG/ML injection Inject 1,000 mcg into the muscle once. Patient takes monthlyHistorical Med      folic acid (FOLVITE) 1 MG tablet Take 1 mg by mouth daily. Historical Med      Cholecalciferol (VITAMIN D) 2000 UNITS TABS Take  by mouth. Historical Med           STOP taking these medications       azithromycin (ZITHROMAX) 250 MG tablet Comments:   Reason for Stopping: Joyce Patino MD              Activity: activity as tolerated  Diet: regular diet  Wound Care: keep wound clean and dry    Follow-up with Joyce Patino MD in 2 weeks.     Signed:  Joyce Patino MD    9/25/2022  6:51 PM

## 2022-10-06 ENCOUNTER — HOSPITAL ENCOUNTER (OUTPATIENT)
Age: 72
Discharge: HOME | End: 2022-10-06
Payer: MEDICARE

## 2022-10-06 LAB
ABSOLUTE EOS #: 0.33 K/UL (ref 0–0.44)
ABSOLUTE IMMATURE GRANULOCYTE: 0.03 K/UL (ref 0–0.3)
ABSOLUTE LYMPH #: 3.53 K/UL (ref 1.1–3.7)
ABSOLUTE MONO #: 0.86 K/UL (ref 0.1–1.2)
ALBUMIN SERPL-MCNC: 3.3 G/DL (ref 3.5–5.2)
ALBUMIN/GLOBULIN RATIO: 1 (ref 1–2.5)
ALP BLD-CCNC: 149 U/L (ref 35–104)
ALT SERPL-CCNC: 5 U/L (ref 5–33)
ANION GAP SERPL CALCULATED.3IONS-SCNC: 19 MMOL/L (ref 9–17)
AST SERPL-CCNC: 14 U/L
BACTERIA: ABNORMAL
BASOPHILS # BLD: 1 % (ref 0–2)
BASOPHILS ABSOLUTE: 0.1 K/UL (ref 0–0.2)
BILIRUB SERPL-MCNC: 0.3 MG/DL (ref 0.3–1.2)
BILIRUBIN URINE: NEGATIVE
BUN BLDV-MCNC: 11 MG/DL (ref 8–23)
CALCIUM SERPL-MCNC: 9.7 MG/DL (ref 8.6–10.4)
CASTS UA: ABNORMAL /LPF (ref 0–2)
CASTS UA: ABNORMAL /LPF (ref 0–2)
CHLORIDE BLD-SCNC: 102 MMOL/L (ref 98–107)
CO2: 20 MMOL/L (ref 20–31)
COLOR: YELLOW
CREAT SERPL-MCNC: 1.26 MG/DL (ref 0.5–0.9)
CRYSTALS, UA: ABNORMAL /HPF
CRYSTALS, UA: ABNORMAL /HPF
EOSINOPHILS RELATIVE PERCENT: 3 % (ref 1–4)
EPITHELIAL CELLS UA: ABNORMAL /HPF (ref 0–5)
GFR SERPL CREATININE-BSD FRML MDRD: 46 ML/MIN/1.73M2
GLUCOSE BLD-MCNC: 65 MG/DL (ref 70–99)
GLUCOSE URINE: NEGATIVE
HCT VFR BLD CALC: 28 % (ref 36.3–47.1)
HEMOGLOBIN: 8.1 G/DL (ref 11.9–15.1)
IMMATURE GRANULOCYTES: 0 %
KETONES, URINE: NEGATIVE
LEUKOCYTE ESTERASE, URINE: ABNORMAL
LYMPHOCYTES # BLD: 37 % (ref 24–43)
MCH RBC QN AUTO: 26.6 PG (ref 25.2–33.5)
MCHC RBC AUTO-ENTMCNC: 28.9 G/DL (ref 28.4–34.8)
MCV RBC AUTO: 92.1 FL (ref 82.6–102.9)
MONOCYTES # BLD: 9 % (ref 3–12)
NITRITE, URINE: POSITIVE
NRBC AUTOMATED: 0 PER 100 WBC
PDW BLD-RTO: 18.1 % (ref 11.8–14.4)
PH UA: 5.5 (ref 5–8)
PLATELET # BLD: 523 K/UL (ref 138–453)
PMV BLD AUTO: 10.9 FL (ref 8.1–13.5)
POTASSIUM SERPL-SCNC: 4.9 MMOL/L (ref 3.7–5.3)
PROTEIN UA: NEGATIVE
PTH INTACT: 31.4 PG/ML (ref 14–72)
RBC # BLD: 3.04 M/UL (ref 3.95–5.11)
RBC # BLD: ABNORMAL 10*6/UL
RBC UA: ABNORMAL /HPF (ref 0–2)
SEG NEUTROPHILS: 50 % (ref 36–65)
SEGMENTED NEUTROPHILS ABSOLUTE COUNT: 4.8 K/UL (ref 1.5–8.1)
SODIUM BLD-SCNC: 141 MMOL/L (ref 135–144)
SPECIFIC GRAVITY UA: 1.02 (ref 1–1.03)
TOTAL PROTEIN: 6.6 G/DL (ref 6.4–8.3)
TURBIDITY: ABNORMAL
URINE HGB: NEGATIVE
UROBILINOGEN, URINE: NORMAL
VITAMIN D 25-HYDROXY: 59.1 NG/ML
WBC # BLD: 9.7 K/UL (ref 3.5–11.3)
WBC UA: ABNORMAL /HPF (ref 0–5)

## 2022-10-06 PROCEDURE — 87077 CULTURE AEROBIC IDENTIFY: CPT

## 2022-10-06 PROCEDURE — 87086 URINE CULTURE/COLONY COUNT: CPT

## 2022-10-06 PROCEDURE — 81001 URINALYSIS AUTO W/SCOPE: CPT

## 2022-10-06 PROCEDURE — 83970 ASSAY OF PARATHORMONE: CPT

## 2022-10-06 PROCEDURE — 80053 COMPREHEN METABOLIC PANEL: CPT

## 2022-10-06 PROCEDURE — 85025 COMPLETE CBC W/AUTO DIFF WBC: CPT

## 2022-10-06 PROCEDURE — 87186 SC STD MICRODIL/AGAR DIL: CPT

## 2022-10-06 PROCEDURE — 36415 COLL VENOUS BLD VENIPUNCTURE: CPT

## 2022-10-06 PROCEDURE — 82306 VITAMIN D 25 HYDROXY: CPT

## 2022-10-07 LAB
CULTURE: ABNORMAL
SPECIMEN DESCRIPTION: ABNORMAL

## 2023-01-19 ENCOUNTER — HOSPITAL ENCOUNTER (EMERGENCY)
Age: 73
Discharge: HOME | End: 2023-01-19
Attending: EMERGENCY MEDICINE
Payer: MEDICARE

## 2023-01-19 VITALS
BODY MASS INDEX: 28.88 KG/M2 | SYSTOLIC BLOOD PRESSURE: 123 MMHG | DIASTOLIC BLOOD PRESSURE: 71 MMHG | OXYGEN SATURATION: 97 % | HEART RATE: 108 BPM | RESPIRATION RATE: 18 BRPM | TEMPERATURE: 98.1 F | HEIGHT: 63 IN | WEIGHT: 163 LBS

## 2023-01-19 DIAGNOSIS — S01.512A LACERATION OF TONGUE, INITIAL ENCOUNTER: Primary | ICD-10-CM

## 2023-01-19 PROCEDURE — 99282 EMERGENCY DEPT VISIT SF MDM: CPT

## 2023-01-19 RX ORDER — ROFLUMILAST 500 UG/1
500 TABLET ORAL DAILY
COMMUNITY

## 2023-01-19 RX ORDER — LIDOCAINE HYDROCHLORIDE AND EPINEPHRINE 10; 10 MG/ML; UG/ML
20 INJECTION, SOLUTION INFILTRATION; PERINEURAL ONCE
Status: DISCONTINUED | OUTPATIENT
Start: 2023-01-19 | End: 2023-01-19 | Stop reason: HOSPADM

## 2023-01-19 RX ORDER — FERROUS SULFATE 325(65) MG
325 TABLET ORAL DAILY
COMMUNITY
Start: 2022-11-08

## 2023-01-19 RX ORDER — ROSUVASTATIN CALCIUM 10 MG/1
10 TABLET, COATED ORAL DAILY
COMMUNITY
Start: 2021-10-18

## 2023-01-19 ASSESSMENT — PAIN - FUNCTIONAL ASSESSMENT: PAIN_FUNCTIONAL_ASSESSMENT: NONE - DENIES PAIN

## 2023-01-19 NOTE — DISCHARGE INSTRUCTIONS
If tongue bleeding recurs, return to the ED. Skip your next dose of Xarelto. Return for worsening symptoms.

## 2023-01-25 NOTE — ED PROVIDER NOTES
[address]      Pt Name: Gladys Justice  MRN: [id number]  [place] 1950  Date of evaluation: 1/19/2023  Provider: Yash Bennett MD    CHIEF COMPLAINT     Chief Complaint   Patient presents with    Mouth Injury         HISTORY OF PRESENT ILLNESS   (Location/Symptom, Timing/Onset, Context/Setting,Quality, Duration, Modifying Factors, Severity)  Note limiting factors. Gladys Justice is a 81 y.o. female who presents to the emergency department stating since last night she has been spitting up small amounts of bright red blood. She denies any dental pain, throat pain, shortness of breath or cough. She is status post carotid endarterectomy and has a history of innominate artery stenosis. She takes Xarelto. Patient also has a history of chronic atrial fibrillation. The history is provided by the patient and medical records. Nursing Notes werereviewed. REVIEW OF SYSTEMS    (2-9 systems for level 4, 10 or more for level 5)     Review of Systems   All other systems reviewed and are negative. Except as noted above the remainder of the review of systems was reviewed and negative.        PAST MEDICAL HISTORY     Past Medical History:   Diagnosis Date    A-fib [facility])     Anxiety     Arthritis     Blood transfusion reaction     2001 with surgery    CAD (coronary artery disease)     Carotid stenosis     COPD (chronic obstructive pulmonary disease) (HCC)     GERD (gastroesophageal reflux disease)     Hepatitis B 1972    History of blood transfusion 2001    Hyperlipidemia     Hypertension     Hypothyroid     Kidney disease     GFR 60, with mass lower left chamber and right upper   Stage 3    Legally blind     patient gets eye appointment    Macular edema 2013    eye injections    PVD (peripheral vascular disease) ([address]     Sleep apnea     CPAP    Subclavian artery stenosis ([facility] [address]          SURGICALHISTORY       Past Surgical History:   Procedure Laterality Date    AORTO-FEMORAL BYPASS GRAFT  2001    CARDIAC CATHETERIZATION  10/2012    no stents    CAROTID ENDARTERECTOMY      CAROTID ENDARTERECTOMY Left 05/16/2019    CAROTID ENDARTERECTOMY LEFT (Left Neck)    CAROTID ENDARTERECTOMY Left 5/16/2019    CAROTID ENDARTERECTOMY LEFT performed by Bishop Abel MD at [facility] Bilateral     CHOLECYSTECTOMY  05/05/2014    laparscopic    EYE SURGERY      LASIK LEFT EYE    EYE SURGERY      catarat extraction    TOE AMPUTATION  2001    gangrene 2001    TONSILLECTOMY      TUMOR REMOVAL Left     carotid    VASCULAR SURGERY N/A 9/23/2022    FEMORAL EMBOLECTOMY THROMBECTOMY performed by Bishop Abel MD at [address]       Discharge Medication List as of 1/19/2023  9:53 AM        CONTINUE these medications which have NOT CHANGED    Details   ferrous sulfate (IRON 325) 325 (65 Fe) MG tablet Take 325 mg by mouth dailyHistorical Med      rosuvastatin (CRESTOR) 10 MG tablet Take 10 mg by mouth dailyHistorical Med      Roflumilast (DALIRESP) 500 MCG tablet Take 500 mcg by mouth dailyHistorical Med      XARELTO 20 MG TABS tablet Take 20 mg by mouth daily, DAWHistorical Med      amLODIPine (NORVASC) 5 MG tablet Take 5 mg by mouth dailyHistorical Med      SYMBICORT 160-4.5 MCG/ACT AERO DAWHistorical Med      calcium carbonate 1500 (600 Ca) MG TABS tablet Historical Med      furosemide (LASIX) 40 MG tablet Take 20 mg by mouth dailyHistorical Med      pregabalin (LYRICA) 150 MG capsule Take 150 mg by mouth 2 times daily. Historical Med      alendronate (FOSAMAX) 35 MG tablet Take 35 mg by mouth every 7 daysHistorical Med      aspirin 81 MG EC tablet Take 81 mg by mouth dailyHistorical Med      varenicline (CHANTIX) 1 MG tablet Take 1 mg by mouth 2 times dailyHistorical Med      fexofenadine (ALLEGRA) 180 MG tablet Take 180 mg by mouth dailyHistorical Med      losartan (COZAAR) 50 MG tablet Take 50 mg by mouth dailyHistorical Med      levothyroxine (SYNTHROID) 50 MCG tablet Take 50 mcg by mouth Daily.Historical Med      omeprazole (PRILOSEC) 40 MG delayed release capsule Take 40 mg by mouth daily.Historical Med      albuterol (PROVENTIL HFA;VENTOLIN HFA) 108 (90 BASE) MCG/ACT inhaler Inhale 2 puffs into the lungs every 4 hours as needed for Wheezing.Historical Med      folic acid (FOLVITE) 1 MG tablet Take 1 mg by mouth daily.Historical Med      Cholecalciferol (VITAMIN D) 2000 UNITS TABS Take  by mouth.Historical Med             ALLERGIES     Iv contrast [iodides]    FAMILY HISTORY       Family History   Problem Relation Age of Onset    Heart Attack Father     Stroke Father     High Blood Pressure Father     Heart Disease Father     Cancer Mother         Multiple myeloma    Cancer Sister         Lung with mets to brain    Cancer Maternal Aunt         Several aunts had unknown cancer    Heart Attack Brother     Liver Disease Brother     Heart Defect Sister           SOCIAL HISTORY       Social History     Socioeconomic History    Marital status:      Spouse name: None    Number of children: None    Years of education: None    Highest education level: None   Tobacco Use    Smoking status: Every Day     Packs/day: 1.00     Types: Cigarettes     Last attempt to quit: 2014     Years since quittin.7    Smokeless tobacco: Former    Tobacco comments:     USING E-CIG   Vaping Use    Vaping Use: Every day    Substances: Always   Substance and Sexual Activity    Alcohol use: No     Alcohol/week: 0.0 standard drinks    Drug use: No   Social History Narrative    Lives alone       SCREENINGS    Caleb Coma Scale  Eye Opening: Spontaneous  Best Verbal Response: Oriented  Best Motor Response: Obeys commands  Caleb Coma Scale Score: 15        PHYSICAL EXAM    (up to 7 for level 4, 8 or more for level 5)     ED Triage Vitals [23 0855]   BP Temp Temp Source Heart Rate Resp SpO2 Height Weight   123/71 98.1 °F (36.7 °C) Axillary (!) 108  18 97 % 5' 3\" (1.6 m) 163 lb (73.9 kg)       Physical Exam  Vitals reviewed. Constitutional:       General: She is not in acute distress. Appearance: She is not ill-appearing. HENT:      Head: Normocephalic. Right Ear: External ear normal.      Left Ear: External ear normal.      Nose: Nose normal.      Mouth/Throat:      Mouth: Mucous membranes are moist.      Comments: Patient has a small open area in the midline on the dorsum of the tongue from where she is oozing a small amount of bright red blood and this appears to be the source of her oral bleed. Eyes:      Extraocular Movements: Extraocular movements intact. Cardiovascular:      Rate and Rhythm: Regular rhythm. Tachycardia present. Pulmonary:      Effort: Pulmonary effort is normal.      Breath sounds: Normal breath sounds. Musculoskeletal:      Cervical back: Neck supple. Skin:     General: Skin is warm and dry. Coloration: Skin is not pale. Neurological:      Mental Status: She is alert. DIAGNOSTIC RESULTS     EKG: All EKG's are interpreted by the Emergency Department Physician who either signs orCo-signs this chart in the absence of a cardiologist.    RADIOLOGY:     Interpretation per the Radiologist below, ifavailable at the time of this note:    No orders to display         ED BEDSIDE ULTRASOUND:   Performed by ED Physician - none    LABS:  Labs Reviewed - No data to display    All other labs were within normal range ornot returned as of this dictation. EMERGENCY DEPARTMENT COURSE and DIFFERENTIAL DIAGNOSIS/MDM:   Vitals:    Vitals:    01/19/23 0855   BP: 123/71   Pulse: (!) 108   Resp: 18   Temp: 98.1 °F (36.7 °C)   TempSrc: Axillary   SpO2: 97%   Weight: 163 lb (73.9 kg)   Height: 5' 3\" (1.6 m)            Having identified the source of her bleed I cauterized it with silver nitrate. We held pressure for about 15 minutes after which there has been no recurrence of the bleed.   She is advised to skip her next dose of Xarelto    CONSULTS:  None    PROCEDURES:  Unlessotherwise noted below, none     Procedures    FINAL IMPRESSION      1. Laceration of tongue, initial encounter          DISPOSITION/PLAN   DISPOSITION Decision To Discharge 01/19/2023 09:52:21 AM      PATIENT REFERRED TO:  Pamela Kirkpatrick MD  [address]  [phone]  [facility] [phone]            DISCHARGE MEDICATIONS:         Problem List:  Patient Active Problem List   Diagnosis Code    S/P laparoscopic cholecystectomy Z90.49    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    S/P carotid endarterectomy Z98.890    Disorder of carotid artery (HCC) I77.9    Disorder of lung J98.4    Kidney disorder N28.9    Vascular disorder I99.9    PNA (pneumonia) J18.9    History of embolectomy Z98.890           Summation      Patient Course: Discharged    ED Medicationsadministered this visit:  Medications - No data to display    New Prescriptions from this visit:    Discharge Medication List as of 1/19/2023  9:53 AM          Follow-up:  Pamela Kirkpatrick MD  [address]  [phone] [facility]  [phone]              Final Impression:   1.  Laceration of tongue, initial encounter               (Please note that portions of this note were completed with a voice recognitionprogram.  Efforts were made to edit the dictations but occasionally words are mis-transcribed.)    Ki Gurrola MD (electronically signed)  Attending Emergency Physician           Ki Gurrola MD  01/25/23 [id number]

## 2023-02-13 ENCOUNTER — APPOINTMENT (OUTPATIENT)
Dept: GENERAL RADIOLOGY | Age: 73
DRG: 312 | End: 2023-02-13
Payer: MEDICARE

## 2023-02-13 ENCOUNTER — HOSPITAL ENCOUNTER (INPATIENT)
Age: 73
LOS: 3 days | Discharge: HOME HEALTH | DRG: 312 | End: 2023-02-16
Attending: EMERGENCY MEDICINE | Admitting: STUDENT IN AN ORGANIZED HEALTH CARE EDUCATION/TRAINING PROGRAM
Payer: MEDICARE

## 2023-02-13 DIAGNOSIS — R55 SYNCOPE AND COLLAPSE: Primary | ICD-10-CM

## 2023-02-13 PROBLEM — E78.5 HYPERLIPIDEMIA: Status: ACTIVE | Noted: 2023-02-13

## 2023-02-13 PROBLEM — N18.31 STAGE 3A CHRONIC KIDNEY DISEASE (HCC): Status: ACTIVE | Noted: 2023-02-13

## 2023-02-13 PROBLEM — E03.9 HYPOTHYROIDISM: Status: ACTIVE | Noted: 2023-02-13

## 2023-02-13 PROBLEM — J42 CHRONIC BRONCHITIS (HCC): Status: ACTIVE | Noted: 2023-02-13

## 2023-02-13 PROBLEM — R77.8 ELEVATED TROPONIN: Status: ACTIVE | Noted: 2023-02-13

## 2023-02-13 PROBLEM — H53.9 VISION DISORDER: Status: ACTIVE | Noted: 2023-02-13

## 2023-02-13 PROBLEM — I48.20 CHRONIC ATRIAL FIBRILLATION (HCC): Status: ACTIVE | Noted: 2023-02-13

## 2023-02-13 PROBLEM — R79.89 ELEVATED TROPONIN: Status: ACTIVE | Noted: 2023-02-13

## 2023-02-13 LAB
ABSOLUTE EOS #: 0.16 K/UL (ref 0–0.4)
ABSOLUTE LYMPH #: 2.39 K/UL (ref 1–4.8)
ABSOLUTE MONO #: 0.74 K/UL (ref 0.1–1.2)
ALBUMIN SERPL-MCNC: 4.3 G/DL (ref 3.5–5.2)
ALBUMIN/GLOBULIN RATIO: 1.3 (ref 1–2.5)
ALP SERPL-CCNC: 104 U/L (ref 35–104)
ALT SERPL-CCNC: 7 U/L (ref 5–33)
ANION GAP SERPL CALCULATED.3IONS-SCNC: 13 MMOL/L (ref 9–17)
AST SERPL-CCNC: 12 U/L
BACTERIA: ABNORMAL
BASOPHILS # BLD: 0 % (ref 0–2)
BASOPHILS ABSOLUTE: 0.02 K/UL (ref 0–0.2)
BILIRUB SERPL-MCNC: 0.8 MG/DL (ref 0.3–1.2)
BILIRUBIN URINE: NEGATIVE
BNP SERPL-MCNC: 440 PG/ML
BUN SERPL-MCNC: 14 MG/DL (ref 8–23)
CALCIUM SERPL-MCNC: 10 MG/DL (ref 8.6–10.4)
CASTS UA: ABNORMAL /LPF
CASTS UA: ABNORMAL /LPF
CHLORIDE SERPL-SCNC: 96 MMOL/L (ref 98–107)
CO2 SERPL-SCNC: 30 MMOL/L (ref 20–31)
COLOR: YELLOW
CREAT SERPL-MCNC: 1.13 MG/DL (ref 0.5–0.9)
CRYSTALS, UA: ABNORMAL /HPF
EOSINOPHILS RELATIVE PERCENT: 1 % (ref 1–4)
EPITHELIAL CELLS UA: ABNORMAL /HPF (ref 0–5)
GFR SERPL CREATININE-BSD FRML MDRD: 52 ML/MIN/1.73M2
GLUCOSE SERPL-MCNC: 134 MG/DL (ref 70–99)
GLUCOSE UR STRIP.AUTO-MCNC: NEGATIVE MG/DL
HCT VFR BLD AUTO: 36.6 % (ref 36–46)
HGB BLD-MCNC: 12.1 G/DL (ref 12–16)
KETONES UR STRIP.AUTO-MCNC: NEGATIVE MG/DL
LEUKOCYTE ESTERASE UR QL STRIP.AUTO: ABNORMAL
LYMPHOCYTES # BLD: 21 % (ref 24–44)
MAGNESIUM SERPL-MCNC: 1.8 MG/DL (ref 1.6–2.6)
MCH RBC QN AUTO: 31 PG (ref 26–34)
MCHC RBC AUTO-ENTMCNC: 33.1 G/DL (ref 31–37)
MCV RBC AUTO: 93.8 FL (ref 80–100)
MONOCYTES # BLD: 7 % (ref 2–11)
NITRITE UR QL STRIP.AUTO: NEGATIVE
OTHER OBSERVATIONS UA: ABNORMAL
PDW BLD-RTO: 14.5 % (ref 12.5–15.4)
PLATELET # BLD AUTO: 295 K/UL (ref 140–450)
PMV BLD AUTO: 10.2 FL (ref 8–14)
POTASSIUM SERPL-SCNC: 3.1 MMOL/L (ref 3.7–5.3)
PROT SERPL-MCNC: 7.5 G/DL (ref 6.4–8.3)
PROT UR STRIP.AUTO-MCNC: 6 MG/DL (ref 5–8)
PROT UR STRIP.AUTO-MCNC: NEGATIVE MG/DL
RBC # BLD: 3.9 M/UL (ref 4–5.2)
RBC CLUMPS #/AREA URNS AUTO: ABNORMAL /HPF (ref 0–2)
SEG NEUTROPHILS: 71 % (ref 36–66)
SEGMENTED NEUTROPHILS ABSOLUTE COUNT: 7.88 K/UL (ref 1.8–7.7)
SODIUM SERPL-SCNC: 139 MMOL/L (ref 135–144)
SPECIFIC GRAVITY UA: 1.01 (ref 1–1.03)
TROPONIN I SERPL DL<=0.01 NG/ML-MCNC: 53 NG/L (ref 0–14)
TROPONIN I SERPL DL<=0.01 NG/ML-MCNC: 54 NG/L (ref 0–14)
TROPONIN I SERPL DL<=0.01 NG/ML-MCNC: 62 NG/L (ref 0–14)
TROPONIN I SERPL DL<=0.01 NG/ML-MCNC: 68 NG/L (ref 0–14)
TURBIDITY: CLEAR
URINE HGB: NEGATIVE
UROBILINOGEN, URINE: NORMAL
WBC # BLD AUTO: 11.2 K/UL (ref 3.5–11)
WBC UA: ABNORMAL /HPF (ref 0–5)

## 2023-02-13 PROCEDURE — 80053 COMPREHEN METABOLIC PANEL: CPT

## 2023-02-13 PROCEDURE — 2060000000 HC ICU INTERMEDIATE R&B

## 2023-02-13 PROCEDURE — 2700000000 HC OXYGEN THERAPY PER DAY

## 2023-02-13 PROCEDURE — 94660 CPAP INITIATION&MGMT: CPT

## 2023-02-13 PROCEDURE — 94760 N-INVAS EAR/PLS OXIMETRY 1: CPT

## 2023-02-13 PROCEDURE — 85025 COMPLETE CBC W/AUTO DIFF WBC: CPT

## 2023-02-13 PROCEDURE — 71048 X-RAY EXAM CHEST 4+ VIEWS: CPT

## 2023-02-13 PROCEDURE — 81001 URINALYSIS AUTO W/SCOPE: CPT

## 2023-02-13 PROCEDURE — 83880 ASSAY OF NATRIURETIC PEPTIDE: CPT

## 2023-02-13 PROCEDURE — 36415 COLL VENOUS BLD VENIPUNCTURE: CPT

## 2023-02-13 PROCEDURE — 83735 ASSAY OF MAGNESIUM: CPT

## 2023-02-13 PROCEDURE — 99285 EMERGENCY DEPT VISIT HI MDM: CPT

## 2023-02-13 PROCEDURE — 84484 ASSAY OF TROPONIN QUANT: CPT

## 2023-02-13 PROCEDURE — 6370000000 HC RX 637 (ALT 250 FOR IP): Performed by: STUDENT IN AN ORGANIZED HEALTH CARE EDUCATION/TRAINING PROGRAM

## 2023-02-13 PROCEDURE — 94640 AIRWAY INHALATION TREATMENT: CPT

## 2023-02-13 PROCEDURE — 99222 1ST HOSP IP/OBS MODERATE 55: CPT | Performed by: STUDENT IN AN ORGANIZED HEALTH CARE EDUCATION/TRAINING PROGRAM

## 2023-02-13 PROCEDURE — 6370000000 HC RX 637 (ALT 250 FOR IP)

## 2023-02-13 PROCEDURE — 93880 EXTRACRANIAL BILAT STUDY: CPT

## 2023-02-13 PROCEDURE — 2580000003 HC RX 258: Performed by: STUDENT IN AN ORGANIZED HEALTH CARE EDUCATION/TRAINING PROGRAM

## 2023-02-13 PROCEDURE — 93005 ELECTROCARDIOGRAM TRACING: CPT

## 2023-02-13 RX ORDER — OXYCODONE HYDROCHLORIDE AND ACETAMINOPHEN 5; 325 MG/1; MG/1
1 TABLET ORAL ONCE
Status: COMPLETED | OUTPATIENT
Start: 2023-02-13 | End: 2023-02-13

## 2023-02-13 RX ORDER — POTASSIUM CHLORIDE 20 MEQ/1
40 TABLET, EXTENDED RELEASE ORAL PRN
Status: DISCONTINUED | OUTPATIENT
Start: 2023-02-13 | End: 2023-02-16 | Stop reason: HOSPADM

## 2023-02-13 RX ORDER — PANTOPRAZOLE SODIUM 40 MG/1
40 TABLET, DELAYED RELEASE ORAL
Status: DISCONTINUED | OUTPATIENT
Start: 2023-02-14 | End: 2023-02-16 | Stop reason: HOSPADM

## 2023-02-13 RX ORDER — POTASSIUM CHLORIDE 7.45 MG/ML
10 INJECTION INTRAVENOUS PRN
Status: DISCONTINUED | OUTPATIENT
Start: 2023-02-13 | End: 2023-02-16 | Stop reason: HOSPADM

## 2023-02-13 RX ORDER — SODIUM CHLORIDE 9 MG/ML
INJECTION, SOLUTION INTRAVENOUS CONTINUOUS
Status: DISCONTINUED | OUTPATIENT
Start: 2023-02-13 | End: 2023-02-16 | Stop reason: HOSPADM

## 2023-02-13 RX ORDER — ONDANSETRON 4 MG/1
4 TABLET, ORALLY DISINTEGRATING ORAL EVERY 8 HOURS PRN
Status: DISCONTINUED | OUTPATIENT
Start: 2023-02-13 | End: 2023-02-16 | Stop reason: HOSPADM

## 2023-02-13 RX ORDER — ASPIRIN 81 MG/1
81 TABLET ORAL DAILY
Status: DISCONTINUED | OUTPATIENT
Start: 2023-02-14 | End: 2023-02-16 | Stop reason: HOSPADM

## 2023-02-13 RX ORDER — BUDESONIDE AND FORMOTEROL FUMARATE DIHYDRATE 160; 4.5 UG/1; UG/1
1 AEROSOL RESPIRATORY (INHALATION) 2 TIMES DAILY
Status: DISCONTINUED | OUTPATIENT
Start: 2023-02-13 | End: 2023-02-16 | Stop reason: HOSPADM

## 2023-02-13 RX ORDER — SODIUM CHLORIDE 9 MG/ML
INJECTION, SOLUTION INTRAVENOUS PRN
Status: DISCONTINUED | OUTPATIENT
Start: 2023-02-13 | End: 2023-02-16 | Stop reason: HOSPADM

## 2023-02-13 RX ORDER — AMLODIPINE BESYLATE 5 MG/1
5 TABLET ORAL DAILY
Status: DISCONTINUED | OUTPATIENT
Start: 2023-02-14 | End: 2023-02-14

## 2023-02-13 RX ORDER — SODIUM CHLORIDE 0.9 % (FLUSH) 0.9 %
10 SYRINGE (ML) INJECTION PRN
Status: DISCONTINUED | OUTPATIENT
Start: 2023-02-13 | End: 2023-02-16 | Stop reason: HOSPADM

## 2023-02-13 RX ORDER — LEVOTHYROXINE SODIUM 0.05 MG/1
50 TABLET ORAL DAILY
Status: DISCONTINUED | OUTPATIENT
Start: 2023-02-14 | End: 2023-02-16 | Stop reason: HOSPADM

## 2023-02-13 RX ORDER — SODIUM CHLORIDE 0.9 % (FLUSH) 0.9 %
5-40 SYRINGE (ML) INJECTION EVERY 12 HOURS SCHEDULED
Status: DISCONTINUED | OUTPATIENT
Start: 2023-02-13 | End: 2023-02-16 | Stop reason: HOSPADM

## 2023-02-13 RX ORDER — ROSUVASTATIN CALCIUM 5 MG/1
10 TABLET, COATED ORAL DAILY
Status: DISCONTINUED | OUTPATIENT
Start: 2023-02-14 | End: 2023-02-16 | Stop reason: HOSPADM

## 2023-02-13 RX ORDER — ALPRAZOLAM 1 MG/1
1 TABLET ORAL NIGHTLY PRN
COMMUNITY

## 2023-02-13 RX ORDER — OXYCODONE HYDROCHLORIDE AND ACETAMINOPHEN 5; 325 MG/1; MG/1
2 TABLET ORAL EVERY 4 HOURS PRN
Status: DISCONTINUED | OUTPATIENT
Start: 2023-02-13 | End: 2023-02-16 | Stop reason: HOSPADM

## 2023-02-13 RX ORDER — ALBUTEROL SULFATE 90 UG/1
2 AEROSOL, METERED RESPIRATORY (INHALATION) EVERY 4 HOURS PRN
Status: DISCONTINUED | OUTPATIENT
Start: 2023-02-13 | End: 2023-02-16 | Stop reason: HOSPADM

## 2023-02-13 RX ORDER — ALBUTEROL SULFATE 90 UG/1
2 AEROSOL, METERED RESPIRATORY (INHALATION) 2 TIMES DAILY
Status: DISCONTINUED | OUTPATIENT
Start: 2023-02-13 | End: 2023-02-16 | Stop reason: HOSPADM

## 2023-02-13 RX ORDER — POLYETHYLENE GLYCOL 3350 17 G/17G
17 POWDER, FOR SOLUTION ORAL DAILY PRN
Status: DISCONTINUED | OUTPATIENT
Start: 2023-02-13 | End: 2023-02-16 | Stop reason: HOSPADM

## 2023-02-13 RX ORDER — PREGABALIN 75 MG/1
150 CAPSULE ORAL 2 TIMES DAILY
Status: DISCONTINUED | OUTPATIENT
Start: 2023-02-13 | End: 2023-02-16 | Stop reason: HOSPADM

## 2023-02-13 RX ORDER — MAGNESIUM SULFATE 1 G/100ML
1000 INJECTION INTRAVENOUS PRN
Status: DISCONTINUED | OUTPATIENT
Start: 2023-02-13 | End: 2023-02-16 | Stop reason: HOSPADM

## 2023-02-13 RX ORDER — LANOLIN ALCOHOL/MO/W.PET/CERES
325 CREAM (GRAM) TOPICAL DAILY
Status: DISCONTINUED | OUTPATIENT
Start: 2023-02-14 | End: 2023-02-16 | Stop reason: HOSPADM

## 2023-02-13 RX ORDER — ACETAMINOPHEN 325 MG/1
650 TABLET ORAL EVERY 6 HOURS PRN
Status: DISCONTINUED | OUTPATIENT
Start: 2023-02-13 | End: 2023-02-16 | Stop reason: HOSPADM

## 2023-02-13 RX ORDER — FUROSEMIDE 20 MG/1
20 TABLET ORAL DAILY
Status: DISCONTINUED | OUTPATIENT
Start: 2023-02-14 | End: 2023-02-16 | Stop reason: HOSPADM

## 2023-02-13 RX ORDER — ALPRAZOLAM 0.5 MG/1
1 TABLET ORAL NIGHTLY PRN
Status: DISCONTINUED | OUTPATIENT
Start: 2023-02-13 | End: 2023-02-16 | Stop reason: HOSPADM

## 2023-02-13 RX ORDER — LOSARTAN POTASSIUM 50 MG/1
50 TABLET ORAL DAILY
Status: DISCONTINUED | OUTPATIENT
Start: 2023-02-14 | End: 2023-02-14

## 2023-02-13 RX ORDER — ONDANSETRON 2 MG/ML
4 INJECTION INTRAMUSCULAR; INTRAVENOUS EVERY 6 HOURS PRN
Status: DISCONTINUED | OUTPATIENT
Start: 2023-02-13 | End: 2023-02-16 | Stop reason: HOSPADM

## 2023-02-13 RX ORDER — ROFLUMILAST 500 UG/1
500 TABLET ORAL DAILY
Status: DISCONTINUED | OUTPATIENT
Start: 2023-02-14 | End: 2023-02-16 | Stop reason: HOSPADM

## 2023-02-13 RX ORDER — CETIRIZINE HYDROCHLORIDE 10 MG/1
10 TABLET ORAL DAILY
Status: DISCONTINUED | OUTPATIENT
Start: 2023-02-14 | End: 2023-02-16 | Stop reason: HOSPADM

## 2023-02-13 RX ADMIN — BUDESONIDE AND FORMOTEROL FUMARATE DIHYDRATE 1 PUFF: 160; 4.5 AEROSOL RESPIRATORY (INHALATION) at 19:58

## 2023-02-13 RX ADMIN — PREGABALIN 150 MG: 75 CAPSULE ORAL at 20:48

## 2023-02-13 RX ADMIN — ALPRAZOLAM 1 MG: 0.5 TABLET ORAL at 20:48

## 2023-02-13 RX ADMIN — OXYCODONE HYDROCHLORIDE AND ACETAMINOPHEN 1 TABLET: 5; 325 TABLET ORAL at 12:33

## 2023-02-13 RX ADMIN — SODIUM CHLORIDE: 9 INJECTION, SOLUTION INTRAVENOUS at 15:01

## 2023-02-13 RX ADMIN — POTASSIUM BICARBONATE 40 MEQ: 782 TABLET, EFFERVESCENT ORAL at 15:50

## 2023-02-13 RX ADMIN — OXYCODONE HYDROCHLORIDE AND ACETAMINOPHEN 2 TABLET: 5; 325 TABLET ORAL at 17:28

## 2023-02-13 ASSESSMENT — PAIN SCALES - GENERAL
PAINLEVEL_OUTOF10: 4
PAINLEVEL_OUTOF10: 5
PAINLEVEL_OUTOF10: 8
PAINLEVEL_OUTOF10: 5
PAINLEVEL_OUTOF10: 6
PAINLEVEL_OUTOF10: 10

## 2023-02-13 ASSESSMENT — PAIN DESCRIPTION - PAIN TYPE
TYPE: ACUTE PAIN
TYPE: ACUTE PAIN

## 2023-02-13 ASSESSMENT — PAIN DESCRIPTION - ORIENTATION
ORIENTATION: RIGHT;POSTERIOR
ORIENTATION: RIGHT
ORIENTATION: RIGHT;POSTERIOR
ORIENTATION: RIGHT

## 2023-02-13 ASSESSMENT — PAIN DESCRIPTION - LOCATION
LOCATION: RIB CAGE
LOCATION: FLANK

## 2023-02-13 ASSESSMENT — PAIN DESCRIPTION - DESCRIPTORS
DESCRIPTORS: ACHING
DESCRIPTORS: ACHING;DISCOMFORT

## 2023-02-13 ASSESSMENT — PAIN - FUNCTIONAL ASSESSMENT: PAIN_FUNCTIONAL_ASSESSMENT: 0-10

## 2023-02-13 ASSESSMENT — PAIN SCALES - WONG BAKER: WONGBAKER_NUMERICALRESPONSE: 0

## 2023-02-13 ASSESSMENT — LIFESTYLE VARIABLES: HOW MANY STANDARD DRINKS CONTAINING ALCOHOL DO YOU HAVE ON A TYPICAL DAY: PATIENT DOES NOT DRINK

## 2023-02-13 ASSESSMENT — PAIN DESCRIPTION - FREQUENCY
FREQUENCY: INTERMITTENT
FREQUENCY: INTERMITTENT

## 2023-02-13 NOTE — PLAN OF CARE
Problem: Discharge Planning  Goal: Discharge to home or other facility with appropriate resources  Outcome: Progressing  Flowsheets (Taken 2/13/2023 0411)  Discharge to home or other facility with appropriate resources: Identify barriers to discharge with patient and caregiver     Problem: Pain  Goal: Verbalizes/displays adequate comfort level or baseline comfort level  Outcome: Progressing     Problem: Safety - Adult  Goal: Free from fall injury  Outcome: Progressing

## 2023-02-13 NOTE — ED PROVIDER NOTES
[facility]  EMERGENCY DEPARTMENT ENCOUNTER      Pt Name: Sindhu Aragon  MRN: [id number]  [place] 1950  Date of evaluation: 2/13/2023  Provider: RAYMUNDO Singletary CNP    CHIEF COMPLAINT       Chief Complaint   Patient presents with    Fall         HISTORY OF PRESENT ILLNESS   (Location/Symptom, Timing/Onset, Context/Setting, Quality, Duration, Modifying Factors, Severity)  Note limiting factors. Sindhu Aragon is a 81 y.o. female who presents to the emergency department with family member with complaint of fall at approximately 3:00 this morning while at a sleep study. Patient states she was doing her annual sleep study when at 3:00 in the morning she got up to go to the bathroom did not use her walker members attempting to go to the bathroom and then waking up on the floor of the bathroom. Patient states she thinks she hit a monitor basket that was in the bathroom with her right lateral flank lower rib area where she is experiencing pain. Denies hitting her head or any headache. Patient denies any chest pain, dizziness, shortness of breath prior to the incident. Patient states she is not really sure what made her fall last evening. States she did not come at that time because she was afraid of not having a ride home from the hospital pain and came in today. She denies any chest pain, shortness of breath or dizziness at present. Denies any other complaints or concerns at this time. Nursing Notes were reviewed. REVIEW OF SYSTEMS    (2-9 systems for level 4, 10 or more for level 5)     Review of Systems   Musculoskeletal:         Right flank/rib pain, bruising   All other systems reviewed and are negative. Except as noted above the remainder of the review of systems was reviewed and negative.        PAST MEDICAL HISTORY     Past Medical History:   Diagnosis Date    A-fib [facility])     Anxiety     Arthritis     Blood transfusion reaction     2001 with surgery    CAD (coronary artery disease)     Carotid stenosis     COPD (chronic obstructive pulmonary disease) (HCC)     GERD (gastroesophageal reflux disease)     Hepatitis B 1972    History of blood transfusion 2001    Hyperlipidemia     Hypertension     Hypothyroid     Kidney disease     GFR 60, with mass lower left chamber and right upper   Stage 3    Legally blind     patient gets eye appointment    Macular edema 2013    eye injections    PVD (peripheral vascular disease) ([address]     Sleep apnea     CPAP    Subclavian artery stenosis [facility])          SURGICAL HISTORY       Past Surgical History:   Procedure Laterality Date    AORTO-FEMORAL BYPASS GRAFT  2001    CARDIAC CATHETERIZATION  10/2012    no stents    CAROTID ENDARTERECTOMY      CAROTID ENDARTERECTOMY Left 05/16/2019    CAROTID ENDARTERECTOMY LEFT (Left Neck)    CAROTID ENDARTERECTOMY Left 5/16/2019    CAROTID ENDARTERECTOMY LEFT performed by Margret Cole MD at [facility] Bilateral     CHOLECYSTECTOMY  05/05/2014    laparscopic    EYE SURGERY      LASIK LEFT EYE    EYE SURGERY      catarat extraction    TOE AMPUTATION  2001    gangrene 2001    TONSILLECTOMY      TUMOR REMOVAL Left     carotid    VASCULAR SURGERY N/A 9/23/2022    FEMORAL EMBOLECTOMY THROMBECTOMY performed by Margret Cole MD at [address] [place] [address]       Current Discharge Medication List        CONTINUE these medications which have NOT CHANGED    Details   ALPRAZolam (XANAX) 1 MG tablet Take 1 mg by mouth nightly as needed for Sleep.       ferrous sulfate (IRON 325) 325 (65 Fe) MG tablet Take 325 mg by mouth daily      rosuvastatin (CRESTOR) 10 MG tablet Take 10 mg by mouth daily      Roflumilast (DALIRESP) 500 MCG tablet Take 500 mcg by mouth daily      XARELTO 20 MG TABS tablet Take 20 mg by mouth daily      amLODIPine (NORVASC) 5 MG tablet Take 5 mg by mouth daily      SYMBICORT 160-4.5 MCG/ACT AERO       calcium carbonate 1500 (600 Ca) MG TABS tablet furosemide (LASIX) 40 MG tablet Take 20 mg by mouth daily      pregabalin (LYRICA) 150 MG capsule Take 150 mg by mouth 2 times daily. alendronate (FOSAMAX) 35 MG tablet Take 35 mg by mouth every 7 days      aspirin 81 MG EC tablet Take 81 mg by mouth daily      varenicline (CHANTIX) 1 MG tablet Take 1 mg by mouth 2 times daily      fexofenadine (ALLEGRA) 180 MG tablet Take 180 mg by mouth daily      losartan (COZAAR) 50 MG tablet Take 50 mg by mouth daily      levothyroxine (SYNTHROID) 50 MCG tablet Take 50 mcg by mouth Daily. omeprazole (PRILOSEC) 40 MG delayed release capsule Take 40 mg by mouth daily. albuterol (PROVENTIL HFA;VENTOLIN HFA) 108 (90 BASE) MCG/ACT inhaler Inhale 2 puffs into the lungs every 4 hours as needed for Wheezing. folic acid (FOLVITE) 1 MG tablet Take 1 mg by mouth daily. Cholecalciferol (VITAMIN D) 2000 UNITS TABS Take  by mouth. ALLERGIES     Iv contrast [iodides]    FAMILY HISTORY       Family History   Problem Relation Age of Onset    Heart Attack Father     Stroke Father     High Blood Pressure Father     Heart Disease Father     Cancer Mother         Multiple myeloma    Cancer Sister         Lung with mets to brain    Cancer Maternal Aunt         Several aunts had unknown cancer    Heart Attack Brother     Liver Disease Brother     Heart Defect Sister           SOCIAL HISTORY       Social History     Socioeconomic History    Marital status:     Tobacco Use    Smoking status: Every Day     Packs/day: 1.00     Types: Cigarettes     Last attempt to quit: 2014     Years since quittin.8    Smokeless tobacco: Former    Tobacco comments:     USING E-[employer]   Vaping Use    Vaping Use: Every day    Substances: Always   Substance and Sexual Activity    Alcohol use: No     Alcohol/week: 0.0 standard drinks    Drug use: No   Social History Narrative    Lives alone       SCREENINGS        [place] Coma Scale  Eye Opening: Spontaneous  Best Verbal Response: Oriented  Best Motor Response: Obeys commands  [place] Coma Scale Score: 15               PHYSICAL EXAM    (up to 7 for level 4, 8 or more for level 5)     ED Triage Vitals [02/13/23 1048]   BP Temp Temp src Heart Rate Resp SpO2 Height Weight   (!) 153/85 97.7 °F (36.5 °C) -- (!) 111 20 98 % 5' 3\" (1.6 m) 163 lb (73.9 kg)       Physical Exam  Constitutional:       Comments: Chronically ill apearing   HENT:      Head: Normocephalic and atraumatic. Mouth/Throat:      Mouth: Mucous membranes are moist.   Eyes:      Extraocular Movements: Extraocular movements intact. Conjunctiva/sclera: Conjunctivae normal.      Pupils: Pupils are equal, round, and reactive to light. Cardiovascular:      Rate and Rhythm: Rhythm irregular. Pulses: Normal pulses. Heart sounds: Normal heart sounds. No murmur heard. No gallop. Pulmonary:      Effort: Pulmonary effort is normal.      Breath sounds: Rales present. Comments: Bases bilat  Abdominal:      General: Bowel sounds are normal.      Palpations: Abdomen is soft. Musculoskeletal:         General: Normal range of motion. Cervical back: Normal range of motion and neck supple. Comments: Bruising, tenderness right lat lower rib/flank area, no crepitus noted   Skin:     General: Skin is warm and dry. Coloration: Skin is pale. Neurological:      General: No focal deficit present. Mental Status: She is alert and oriented to person, place, and time. Psychiatric:         Mood and Affect: Mood normal.         Behavior: Behavior normal.         Thought Content:  Thought content normal.         Judgment: Judgment normal.       DIAGNOSTIC RESULTS         RADIOLOGY:   Non-plain film images such as CT, Ultrasound and MRI are read by the radiologist. Plain radiographic images are visualized and preliminarily interpreted by the emergency physician with the below findings:     XR CHEST PA LATERAL W BOTH OBLIQUE PROJECTIONS    Result Date: 2/13/2023  EXAMINATION: CHEST XRAY SPECIAL VIEWS 2/13/2023 11:33 am COMPARISON: 07/09/2022 HISTORY: ORDERING SYSTEM PROVIDED HISTORY: fall r/o rib fx/pneumo TECHNOLOGIST PROVIDED HISTORY: fall r/o rib fx/pneumo Reason for Exam: fall r/o rib fx/pneumo FINDINGS: Normal cardiomediastinal silhouette. Vascular stent in the superior mediastinum. No focal consolidation. No pleural effusion. No radiographic evidence for pneumothorax. A small pneumothorax could potentially be obscured on this exam.  No evidence for acute rib fracture. Old right humeral neck fracture noted. 1. No radiographic evidence for pneumothorax. A small pneumothorax could potentially be occult on this exam.  If this remains a clinical concern CT scan would be advised. 2. No radiographic evidence for acute left rib fracture. Interpretation per the Radiologist below, if available at the time of this note:    XR CHEST PA [address]   Final Result   1. No radiographic evidence for pneumothorax. A small pneumothorax could   potentially be occult on this exam.  If this remains a clinical concern CT   scan would be advised. 2. No radiographic evidence for acute left rib fracture.          VL DUP CAROTID BILATERAL    (Results Pending)         ED BEDSIDE ULTRASOUND:   Performed by ED Physician - none    LABS:  Labs Reviewed   URINALYSIS WITH REFLEX TO CULTURE - Abnormal; Notable for the following components:       Result Value    Leukocyte Esterase, Urine TRACE (*)     All other components within normal limits   CBC WITH AUTO DIFFERENTIAL - Abnormal; Notable for the following components:    WBC 11.2 (*)     RBC 3.90 (*)     Seg Neutrophils 71 (*)     Lymphocytes 21 (*)     Segs Absolute 7.88 (*)     All other components within normal limits   COMPREHENSIVE METABOLIC PANEL W/ REFLEX TO MG FOR LOW K - Abnormal; Notable for the following components:    Glucose 134 (*)     Creatinine 1.13 (*)     Est, Glom Filt Rate 52 (*)     Potassium 3.1 (*)     Chloride 96 (*)     All other components within normal limits   TROPONIN - Abnormal; Notable for the following components:    Troponin, High Sensitivity 68 (*)     All other components within normal limits   TROPONIN - Abnormal; Notable for the following components:    Troponin, High Sensitivity 62 (*)     All other components within normal limits   MICROSCOPIC URINALYSIS - Abnormal; Notable for the following components:    Crystals, UA FEW CALCIUM OXALATE (*)     Bacteria, UA FEW (*)     Other Observations UA   (*)     Value: Utilizing a urinalysis as the only screening method to exclude a potential uropathogen can be unreliable in many patient populations. Rapid screening tests are less sensitive than culture and if UTI is a clinical possibility, culture should be considered despite a negative urinalysis. All other components within normal limits   BRAIN NATRIURETIC PEPTIDE - Abnormal; Notable for the following components:    Pro- (*)     All other components within normal limits   TROPONIN - Abnormal; Notable for the following components:    Troponin, High Sensitivity 54 (*)     All other components within normal limits   TROPONIN - Abnormal; Notable for the following components:    Troponin, High Sensitivity 53 (*)     All other components within normal limits   MAGNESIUM   BASIC METABOLIC PANEL W/ REFLEX TO MG FOR LOW K   MAGNESIUM       All other labs were within normal range or not returned as of this dictation.     EMERGENCY DEPARTMENT COURSE and DIFFERENTIAL DIAGNOSIS/MDM:   Vitals:    Vitals:    02/13/23 1448 02/13/23 1615 02/13/23 1728 02/13/23 1758   BP: 130/60      Pulse: (!) 101      Resp: 16  16 18   Temp: 98.2 °F (36.8 °C)      TempSrc: Oral      SpO2: 96% 97%     Weight:       Height:           Vital signs reviewed    MDM  Number of Diagnoses or Management Options  Syncope and collapse  Diagnosis management comments: 81year-old chronically ill-appearing female presents with family member with complaint right flank right lateral lower rib pain and bruising after falling at 3:00 this morning while asleep study. Patient states she was doing her annual sleep study when she got up to go to the bathroom did not use her walker at that time and remembers waking up on the floor in the bathroom. States she feels she hit laundry basket it was in the bathroom as it was stented with her right lateral flank rib area where she has the bruising and pain. She denies any dizziness, chest pain, shortness of breath prior to the incident or currently. States she is unsure what made her fall. Patient has a history of A-fib takes Xarelto daily. Patient denies hitting her head and scalp nontender and no hematomas or abrasions noted. No cervical or lumbar spine tenderness noted. With large bruise to the right flank area right lateral lower rib area with tenderness but no crepitus noted. Denies any other complaints or concerns at this time. Plan for syncopal work-up due to unknown cause of the fall, urinalysis, chest x-ray rule out rib fracture or pneumothorax. REASSESSMENT    1225: Patient updated on lab results and plan to repeat cardiac enzymes 2 hours from the initial potential for admission. Patient states the more she thinks about it she does not remember falling earlier today just remembers someone coming in and helping her up off the floor. Patient complaining right flank right lateral rib pain plan to medicate with Percocet p.o. for pain patient has tolerated that in the past with good results. She denies any new complaints or concerns at this time      CRITICAL CARE TIME       CONSULTS:   [employer]- agrees to admit pt to hospital for further evaluation/work up for elevated cardiac enzymes/syncopal episode pt updated on plan for admission. PROCEDURES:  Unless otherwise noted below, none     Procedures      FINAL IMPRESSION      1.  Syncope and collapse          DISPOSITION/PLAN   DISPOSITION Admitted 02/13/2023 01:36:25 PM      PATIENT REFERRED TO:  No follow-up provider specified. DISCHARGE MEDICATIONS:  Current Discharge Medication List        Controlled Substances Monitoring:     No flowsheet data found.     (Please note that portions of this note were completed with a voice recognition program.  Efforts were made to edit the dictations but occasionally words are mis-transcribed.)    RAYMUNDO Singletary CNP (electronically signed)  Attending Emergency Physician           RAYMUNDO Singletary CNP  02/13/23 2100

## 2023-02-13 NOTE — ED NOTES
Provided warm blankets- adjusted bed for comfort. Denies further needs at this time.  Call light in reach     Shital Christine RN  02/13/23 [phone]

## 2023-02-13 NOTE — ED PROVIDER NOTES
[address] [facility] Emergency Department  [address] [address] [facility] [address] [facility] [address]  Phone: [phone]  Fax: [phone]      Attending Physician Attestation    I performed a history and physical examination of the patient and discussed management with the mid level provider. I reviewed the mid level provider's note and agree with the documented findings and plan of care. Any areas of disagreement are noted on the chart. I was personally present for the key portions of any procedures. I have documented in the chart those procedures where I was not present during the key portions. I have reviewed the emergency nurses triage note. I agree with the chief complaint, past medical history, past surgical history, allergies, medications, social and family history as documented unless otherwise noted below. Documentation of the HPI, Physical Exam and Medical Decision Making performed by mid level providers is based on my personal performance of the HPI, PE and MDM. For Physician Assistant/ Nurse Practitioner cases/documentation I have personally evaluated this patient and have completed at least one if not all key elements of the E/M (history, physical exam, and MDM). Additional findings are as noted. CHIEF COMPLAINT       Chief Complaint   Patient presents with    [facility] is a 81 y.o. female who presents complaining of right-sided rib pain. Patient was at a sleep study last night. She got up to go to the bathroom. She typically uses her walker but did not. She fell in the bathroom but does not know why. The result of this fall is injury to her right lower and posterior ribs. When asked again when she fell in multiple different ways she is unable to tell me why she fell. She denies passing out. She says that she is fallen in the past but not like this. Pain on a scale 0-10 is a 10. Move makes pain worse. Nothing makes pain better.   EMS was called to the scene but she refused transport because she was unable to find a ride to get her car back.      PAST MEDICAL HISTORY    has a past medical history of A-fib (HCC), Anxiety, Arthritis, Blood transfusion reaction, CAD (coronary artery disease), Carotid stenosis, COPD (chronic obstructive pulmonary disease) (HCC), GERD (gastroesophageal reflux disease), Hepatitis B, History of blood transfusion, Hyperlipidemia, Hypertension, Hypothyroid, Kidney disease, Legally blind, Macular edema, PVD (peripheral vascular disease) (HCC), Sleep apnea, and Subclavian artery stenosis (HCC).    SURGICAL HISTORY      has a past surgical history that includes Aorto-femoral Bypass Graft (2001); tumor removal (Left); Tonsillectomy; Toe amputation (2001); Cataract removal (Bilateral); Cholecystectomy (05/05/2014); Carotid endarterectomy; Cardiac catheterization (10/2012); eye surgery; eye surgery; Carotid endarterectomy (Left, 05/16/2019); Carotid endarterectomy (Left, 5/16/2019); and vascular surgery (N/A, 9/23/2022).    CURRENT MEDICATIONS       Previous Medications    ALBUTEROL (PROVENTIL HFA;VENTOLIN HFA) 108 (90 BASE) MCG/ACT INHALER    Inhale 2 puffs into the lungs every 4 hours as needed for Wheezing.    ALENDRONATE (FOSAMAX) 35 MG TABLET    Take 35 mg by mouth every 7 days    ALPRAZOLAM (XANAX) 1 MG TABLET    Take 1 mg by mouth nightly as needed for Sleep.    AMLODIPINE (NORVASC) 5 MG TABLET    Take 5 mg by mouth daily    ASPIRIN 81 MG EC TABLET    Take 81 mg by mouth daily    CALCIUM CARBONATE 1500 (600 CA) MG TABS TABLET        CHOLECALCIFEROL (VITAMIN D) 2000 UNITS TABS    Take  by mouth.    FERROUS SULFATE (IRON 325) 325 (65 FE) MG TABLET    Take 325 mg by mouth daily    FEXOFENADINE (ALLEGRA) 180 MG TABLET    Take 180 mg by mouth daily    FOLIC ACID (FOLVITE) 1 MG TABLET    Take 1 mg by mouth daily.    FUROSEMIDE (LASIX) 40 MG TABLET    Take 20 mg by mouth daily    LEVOTHYROXINE (SYNTHROID) 50 MCG TABLET    Take 50  mcg by mouth Daily. LOSARTAN (COZAAR) 50 MG TABLET    Take 50 mg by mouth daily    OMEPRAZOLE (PRILOSEC) 40 MG DELAYED RELEASE CAPSULE    Take 40 mg by mouth daily. PREGABALIN (LYRICA) 150 MG CAPSULE    Take 150 mg by mouth 2 times daily. ROFLUMILAST (DALIRESP) 500 MCG TABLET    Take 500 mcg by mouth daily    ROSUVASTATIN (CRESTOR) 10 MG TABLET    Take 10 mg by mouth daily    SYMBICORT 160-4.5 MCG/ACT AERO        VARENICLINE (CHANTIX) 1 MG TABLET    Take 1 mg by mouth 2 times daily    XARELTO 20 MG TABS TABLET    Take 20 mg by mouth daily       ALLERGIES     is allergic to iv contrast [iodides]. FAMILY HISTORY     She indicated that her mother is . She indicated that her father is . She indicated that two of her four sisters are . She indicated that only one of her four brothers is alive. She indicated that the status of her maternal aunt is unknown.     family history includes Cancer in her maternal aunt, mother, and sister; Heart Attack in her brother and father; Heart Defect in her sister; Heart Disease in her father; High Blood Pressure in her father; Liver Disease in her brother; Stroke in her father. SOCIAL HISTORY      reports that she has been smoking. She has been smoking an average of 1 pack per day. She has quit using smokeless tobacco. She reports that she does not drink alcohol and does not use drugs. PHYSICAL EXAM     INITIAL VITALS:  height is 5' 3\" (1.6 m) and weight is 73.9 kg (163 lb). Her temperature is 97.7 °F (36.5 °C). Her blood pressure is 153/85 (abnormal) and her pulse is 111 (abnormal). Her respiration is 20 and oxygen saturation is 98%. Patient is pleasant lying on exam table. Heart is regular. Lungs are clear. Right ribs are diffuse tender to palpation without crepitus. Breath sounds are symmetrical.  Abdomen soft and benign. Patient is neurologically intact.       DIAGNOSTIC RESULTS     EKG: All EKG's are interpreted by the Emergency Department Physician who either signs or Co-signs this chart in the absence of a cardiologist.    EKG is interpreted by myself showing atrial fibrillation without any acute ST segment changes. Rate, axis and intervals are otherwise normal.    RADIOLOGY:   Non-plain film images such as CT, Ultrasound and MRI are read by the radiologist. Caprice Simmonds radiographic images are visualized and the radiologist interpretations are reviewed as follows:     XR CHEST PA LATERAL W BOTH OBLIQUE PROJECTIONS    Result Date: 2/13/2023  EXAMINATION: CHEST XRAY SPECIAL VIEWS 2/13/2023 11:33 am COMPARISON: 07/09/2022 HISTORY: ORDERING SYSTEM PROVIDED HISTORY: fall r/o rib fx/pneumo TECHNOLOGIST PROVIDED HISTORY: fall r/o rib fx/pneumo Reason for Exam: fall r/o rib fx/pneumo FINDINGS: Normal cardiomediastinal silhouette. Vascular stent in the superior mediastinum. No focal consolidation. No pleural effusion. No radiographic evidence for pneumothorax. A small pneumothorax could potentially be obscured on this exam.  No evidence for acute rib fracture. Old right humeral neck fracture noted. 1. No radiographic evidence for pneumothorax. A small pneumothorax could potentially be occult on this exam.  If this remains a clinical concern CT scan would be advised. 2. No radiographic evidence for acute left rib fracture.         LABS:  Results for orders placed or performed during the hospital encounter of 02/13/23   Urinalysis with Reflex to Culture    Specimen: Urine   Result Value Ref Range    Color, UA Yellow Yellow    Turbidity UA Clear Clear    Glucose, Ur NEGATIVE NEGATIVE    Bilirubin Urine NEGATIVE NEGATIVE    Ketones, Urine NEGATIVE NEGATIVE    Specific Gravity, UA 1.010 1.005 - 1.030    Urine Hgb NEGATIVE NEGATIVE    pH, UA 6.0 5.0 - 8.0    Protein, UA NEGATIVE NEGATIVE    Urobilinogen, Urine Normal Normal    Nitrite, Urine NEGATIVE NEGATIVE    Leukocyte Esterase, Urine TRACE (A) NEGATIVE   CBC with Auto Differential   Result Value Ref Range    WBC 11.2 (H) 3.5 - 11.0 k/uL    RBC 3.90 (L) 4.0 - 5.2 m/uL    Hemoglobin 12.1 12.0 - 16.0 g/dL    Hematocrit 36.6 36 - 46 %    MCV 93.8 80 - 100 fL    MCH 31.0 26 - 34 pg    MCHC 33.1 31 - 37 g/dL    RDW 14.5 12.5 - 15.4 %    Platelets 908 717 - 034 k/uL    MPV 10.2 8.0 - 14.0 fL    Seg Neutrophils 71 (H) 36 - 66 %    Lymphocytes 21 (L) 24 - 44 %    Monocytes 7 2 - 11 %    Eosinophils % 1 1 - 4 %    Basophils 0 0 - 2 %    Segs Absolute 7.88 (H) 1.8 - 7.7 k/uL    Absolute Lymph # 2.39 1.0 - 4.8 k/uL    Absolute Mono # 0.74 0.1 - 1.2 k/uL    Absolute Eos # 0.16 0.0 - 0.4 k/uL    Basophils Absolute 0.02 0.0 - 0.2 k/uL   Comprehensive Metabolic Panel w/ Reflex to MG   Result Value Ref Range    Glucose 134 (H) 70 - 99 mg/dL    BUN 14 8 - 23 mg/dL    Creatinine 1.13 (H) 0.50 - 0.90 mg/dL    Est, Glom Filt Rate 52 (L) >60 mL/min/1.73m2    Calcium 10.0 8.6 - 10.4 mg/dL    Sodium 139 135 - 144 mmol/L    Potassium 3.1 (L) 3.7 - 5.3 mmol/L    Chloride 96 (L) 98 - 107 mmol/L    CO2 30 20 - 31 mmol/L    Anion Gap 13 9 - 17 mmol/L    Alkaline Phosphatase 104 35 - 104 U/L    ALT 7 5 - 33 U/L    AST 12 <32 U/L    Total Bilirubin 0.8 0.3 - 1.2 mg/dL    Total Protein 7.5 6.4 - 8.3 g/dL    Albumin 4.3 3.5 - 5.2 g/dL    Albumin/Globulin Ratio 1.3 1.0 - 2.5   Troponin   Result Value Ref Range    Troponin, High Sensitivity 68 (HH) 0 - 14 ng/L   Magnesium   Result Value Ref Range    Magnesium 1.8 1.6 - 2.6 mg/dL   Microscopic Urinalysis   Result Value Ref Range    WBC, UA 2 TO 5 0 - 5 /HPF    RBC, UA 0 TO 2 0 - 2 /HPF    Casts UA 2 TO 5 /LPF    Casts UA HYALINE /LPF    Crystals, UA FEW CALCIUM OXALATE (A) None /HPF    Epithelial Cells UA 5 TO 10 0 - 5 /HPF    Bacteria, UA FEW (A) None    Other Observations UA (A) NOT REQ. Utilizing a urinalysis as the only screening method to exclude a potential uropathogen can be unreliable in many patient populations.   Rapid screening tests are less sensitive than culture and if UTI is a clinical possibility, culture should be considered despite a negative urinalysis. EMERGENCY DEPARTMENT COURSE:   Vitals:    Vitals:    02/13/23 1048   BP: (!) 153/85   Pulse: (!) 111   Resp: 20   Temp: 97.7 °F (36.5 °C)   SpO2: 98%   Weight: 73.9 kg (163 lb)   Height: 5' 3\" (1.6 m)     -------------------------  BP: (!) 153/85, Temp: 97.7 °F (36.5 °C), Heart Rate: (!) 111, Resp: 20      PERTINENT ATTENDING PHYSICIAN COMMENTS:    Syncope evaluation has been initiated. Troponin is elevated. We will repeat a troponin and it is not trending up but still is elevated worrisome for acute coronary syndrome. Kidney function is a little concentrated but is better than what it has been historically. I do feel that she requires admission for syncopal event. (Please note that portions of this note were completed with a voice recognition program.  Efforts were made to edit the dictations but occasionally words are mis-transcribed.)    Eron Hanson MD,, MD, F.A.C.E.P.   Attending Emergency Medicine Physician        Eron Hanson MD  02/13/23 5113

## 2023-02-13 NOTE — RT PROTOCOL NOTE
RT Inhaler-Nebulizer Bronchodilator Protocol Note    There is a bronchodilator order in the chart from a provider indicating to follow the RT Bronchodilator Protocol and there is an Initiate RT Inhaler-Nebulizer Bronchodilator Protocol order as well (see protocol at bottom of note). CXR Findings:  No results found. The findings from the last RT Protocol Assessment were as follows:   History Pulmonary Disease: Chronic pulmonary disease  Respiratory Pattern: Regular pattern and RR 12-20 bpm  Breath Sounds: Clear breath sounds  Cough: Strong, productive  Indication for Bronchodilator Therapy: On home bronchodilators  Bronchodilator Assessment Score: 3    Aerosolized bronchodilator medication orders have been revised according to the RT Inhaler-Nebulizer Bronchodilator Protocol below. Respiratory Therapist to perform RT Therapy Protocol Assessment initially then follow the protocol. Repeat RT Therapy Protocol Assessment PRN for score 0-3 or on second treatment, BID, and PRN for scores above 3. No Indications - adjust the frequency to every 6 hours PRN wheezing or bronchospasm, if no treatments needed after 48 hours then discontinue using Per Protocol order mode. If indication present, adjust the RT bronchodilator orders based on the Bronchodilator Assessment Score as indicated below. Use Inhaler orders unless patient has one or more of the following: on home nebulizer, not able to hold breath for 10 seconds, is not alert and oriented, cannot activate and use MDI correctly, or respiratory rate 25 breaths per minute or more, then use the equivalent nebulizer order(s) with same Frequency and PRN reasons based on the score. If a patient is on this medication at home then do not decrease Frequency below that used at home.     0-3 - enter or revise RT bronchodilator order(s) to equivalent RT Bronchodilator order with Frequency of every 4 hours PRN for wheezing or increased work of breathing using Per Protocol order mode. 4-6 - enter or revise RT Bronchodilator order(s) to two equivalent RT bronchodilator orders with one order with BID Frequency and one order with Frequency of every 4 hours PRN wheezing or increased work of breathing using Per Protocol order mode. 7-10 - enter or revise RT Bronchodilator order(s) to two equivalent RT bronchodilator orders with one order with TID Frequency and one order with Frequency of every 4 hours PRN wheezing or increased work of breathing using Per Protocol order mode. 11-13 - enter or revise RT Bronchodilator order(s) to one equivalent RT bronchodilator order with QID Frequency and an Albuterol order with Frequency of every 4 hours PRN wheezing or increased work of breathing using Per Protocol order mode. Greater than 13 - enter or revise RT Bronchodilator order(s) to one equivalent RT bronchodilator order with every 4 hours Frequency and an Albuterol order with Frequency of every 2 hours PRN wheezing or increased work of breathing using Per Protocol order mode. RT to enter RT Home Evaluation for COPD & MDI Assessment order using Per Protocol order mode.     Electronically signed by Ammy Bello RCP on 2/13/2023 at 4:14 PM

## 2023-02-13 NOTE — H&P
[facility]  Office: [phone]  Justin Ball DO, Demarcus Dunne DO, Marcello Benítez DO, Farshad Vasques DO, Ragini De Dios MD, Stephany Curry MD, Eliel Orozco MD, Yue Ulrich MD,  Angel Nettles MD, Flores Martínez MD, Chaim Junior DO, Waleska Nj MD,  Aida Banda DO, Raúl Garcia MD, Adams Ratliff MD, Berhane Ball DO, Ruby Ferrari MD, Orlando Fong MD, Romeo Box DO, Concepción Mojica MD, Nevaeh Valencia MD, Sallie Galindo MD, Lindsay Mercedes MD, Abner Nova DO, Jermaine Guzman MD, Anna Medina MD, Shirley Waterhouse, CNP,  Dona Bernabe, CNP, Ame Dsouza, CNP, Mario Glynn, CNP,  Rehana Pablo, DNP, Jessica Mendoza, CNP, Ama Hauser, CNP, Mary Loredo, CNP, Capri Schmidt, CNP, Fang Hidalgo, CNP, Waqas Barnes PA-C, Bernice Greene, CNS, Sherlyn Collins, CNP, Maida Michaels, CNP         [facility]   IN-PATIENT SERVICE   [facility]    HISTORY AND PHYSICAL EXAMINATION            Date:   2/13/2023  Patient name:  Deborah Nuñez  Date of admission:  2/13/2023 10:41 AM  MRN:   [id number]  Account:  [id number]  YOB: 1950  PCP:    Romeo Montiel MD  Room:   ER11/ER11  Code Status:    Prior    Chief Complaint:     Chief Complaint   Patient presents with    Fall     History Obtained From:     patient    History of Present Illness:     Deborah Nuñez is a 72 y.o. female with a past medical history of persistent atrial fibrillation (on Xarelto), COPD, hypothyroidism, hypertension, hyperlipidemia and tobacco abuse who presented to the emergency department on 2/13/2023 complaining of a fall and rib pain. The patient states that she was having a sleep study done last night and woke up in the middle of the night to use the restroom. She states that she fell on her way to the restroom but does not remember how she fell (patient states she was very drowsy at the time). The patient states that she realized she had fallen when people were  standing around her. She denies headache or chest pain prior to the fall and did not hit her head during the fall. She decided to come to the emergency department this morning due to persistent rib pain. In the ED, the patient was afebrile and nontoxic appearing. Chest x-ray was unremarkable and CBC and BMP were at baseline for the patient. Troponin was found to be elevated at 68 with no baseline to compare to. The patient is admitted to internal medicine for further management of presumed syncope and collapse.      Past Medical History:     Past Medical History:   Diagnosis Date    A-fib ([facility] [address]     Anxiety     Arthritis     Blood transfusion reaction     2001 with surgery    CAD (coronary artery disease)     Carotid stenosis     COPD (chronic obstructive pulmonary disease) (HCC)     GERD (gastroesophageal reflux disease)     Hepatitis B 1972    History of blood transfusion 2001    Hyperlipidemia     Hypertension     Hypothyroid     Kidney disease     GFR 60, with mass lower left chamber and right upper   Stage 3    Legally blind     patient gets eye appointment    Macular edema 2013    eye injections    PVD (peripheral vascular disease) ([facility] [address]     Sleep apnea     CPAP    Subclavian artery stenosis [facility])         Past Surgical History:     Past Surgical History:   Procedure Laterality Date    AORTO-FEMORAL BYPASS GRAFT  2001    CARDIAC CATHETERIZATION  10/2012    no stents    CAROTID ENDARTERECTOMY      CAROTID ENDARTERECTOMY Left 05/16/2019    CAROTID ENDARTERECTOMY LEFT (Left Neck)    CAROTID ENDARTERECTOMY Left 5/16/2019    CAROTID ENDARTERECTOMY LEFT performed by Joana Vaca MD at [address] [place] [address] Bilateral     CHOLECYSTECTOMY  05/05/2014    laparscopic    EYE SURGERY      LASIK LEFT EYE    EYE SURGERY      catarat extraction    TOE AMPUTATION  2001    gangrene 2001    TONSILLECTOMY      TUMOR REMOVAL Left     carotid    VASCULAR SURGERY N/A 9/23/2022    FEMORAL EMBOLECTOMY THROMBECTOMY performed by Zahra Mata MD at [facility]        Medications Prior to Admission:     Prior to Admission medications    Medication Sig Start Date End Date Taking? Authorizing Provider   ALPRAZolam [address]) 1 MG tablet Take 1 mg by mouth nightly as needed for Sleep. Yes Historical Provider, MD   ferrous sulfate (IRON 325) 325 (65 Fe) MG tablet Take 325 mg by mouth daily 11/8/22   Historical Provider, MD   rosuvastatin (CRESTOR) 10 MG tablet Take 10 mg by mouth daily 10/18/21   Historical Provider, MD   Roflumilast (DALIRESP) 500 MCG tablet Take 500 mcg by mouth daily    Historical Provider, MD   XARELTO 20 MG TABS tablet Take 20 mg by mouth daily 9/19/22   Historical Provider, MD   amLODIPine (NORVASC) 5 MG tablet Take 5 mg by mouth daily    Historical Provider, MD   [facility] 160-4.5 MCG/ACT AERO  9/23/21   Historical Provider, MD   calcium carbonate 1500 (600 Ca) MG TABS tablet  9/20/21   Historical Provider, MD   furosemide (LASIX) 40 MG tablet Take 20 mg by mouth daily 9/21/21   Historical Provider, MD   pregabalin (LYRICA) 150 MG capsule Take 150 mg by mouth 2 times daily. Historical Provider, MD   alendronate (FOSAMAX) 35 MG tablet Take 35 mg by mouth every 7 days    Historical Provider, MD   aspirin 81 MG EC tablet Take 81 mg by mouth daily    Historical Provider, MD   varenicline (CHANTIX) 1 MG tablet Take 1 mg by mouth 2 times daily  Patient not taking: Reported on 1/19/2023    Historical Provider, MD   fexofenadine (ALLEGRA) 180 MG tablet Take 180 mg by mouth daily    Historical Provider, MD   losartan (COZAAR) 50 MG tablet Take 50 mg by mouth daily    Historical Provider, MD   levothyroxine (SYNTHROID) 50 MCG tablet Take 50 mcg by mouth Daily. Historical Provider, MD   omeprazole (PRILOSEC) 40 MG delayed release capsule Take 40 mg by mouth daily.     Historical Provider, MD   albuterol (PROVENTIL HFA;VENTOLIN HFA) 108 (90 BASE) MCG/ACT inhaler Inhale 2 puffs into the lungs every 4 hours as needed for Wheezing. Historical Provider, MD   folic acid (FOLVITE) 1 MG tablet Take 1 mg by mouth daily. Historical Provider, MD   Cholecalciferol (VITAMIN D) 2000 UNITS TABS Take  by mouth. Historical Provider, MD        Allergies: Iv contrast [iodides]    Social History:     Tobacco:    reports that she has been smoking. She has been smoking an average of 1 pack per day. She has quit using smokeless tobacco.  Alcohol:      reports no history of alcohol use. Drug Use:  reports no history of drug use. Family History:     Family History   Problem Relation Age of Onset    Heart Attack Father     Stroke Father     High Blood Pressure Father     Heart Disease Father     Cancer Mother         Multiple myeloma    Cancer Sister         Lung with mets to brain    Cancer Maternal Aunt         Several aunts had unknown cancer    Heart Attack Brother     Liver Disease Brother     Heart Defect Sister        Review of Systems:     Positive and Negative as described in HPI. CONSTITUTIONAL:  negative for fevers, chills, sweats, fatigue, weight loss  HEENT:  negative for vision, hearing changes, runny nose, throat pain  RESPIRATORY:  negative for shortness of breath, cough, congestion, wheezing  CARDIOVASCULAR:  negative for chest pain, palpitations  GASTROINTESTINAL:  negative for nausea, vomiting, diarrhea, constipation, change in bowel habits, abdominal pain   GENITOURINARY:  negative for difficulty of urination, burning with urination, frequency   INTEGUMENT:  negative for rash, skin lesions, easy bruising   HEMATOLOGIC/LYMPHATIC:  negative for swelling/edema   ALLERGIC/IMMUNOLOGIC:  negative for urticaria , itching  ENDOCRINE:  negative increase in drinking, increase in urination, hot or cold intolerance  MUSCULOSKELETAL:  Positive for rib pain. NEUROLOGICAL:  Positive for possible syncopal episode.    BEHAVIOR/PSYCH:  negative for depression, anxiety    Physical Exam:   /69   Pulse 93   Temp 97.7 °F (36.5 °C)   Resp 14   Ht 5' 3\" (1.6 m)   Wt 163 lb (73.9 kg)   SpO2 95%   BMI 28.87 kg/m²   Temp (24hrs), Av.7 °F (36.5 °C), Min:97.7 °F (36.5 °C), Max:97.7 °F (36.5 °C)    No results for input(s): POCGLU in the last 72 hours. No intake or output data in the 24 hours ending 23 1356    General Appearance:  Frail and chronically ill-appearing. Mental status: oriented to person, place, and time  Head:  normocephalic, atraumatic  Eye: no icterus, redness, pupils equal and reactive, extraocular eye movements intact, conjunctiva clear  Ear: normal external ear, no discharge, hearing intact  Nose:  no drainage noted  Mouth: mucous membranes moist  Neck: supple, no carotid bruits, thyroid not palpable  Lungs: Bruising appreciated over right rib cage. Cardiovascular: Irregular rate appreciated.    Abdomen: Soft, nontender, nondistended, normal bowel sounds, no hepatomegaly or splenomegaly  Neurologic: There are no new focal motor or sensory deficits, normal muscle tone and bulk, no abnormal sensation, normal speech, cranial nerves II through XII grossly intact  Skin: No gross lesions, rashes, bruising or bleeding on exposed skin area  Extremities:  peripheral pulses palpable, no pedal edema or calf pain with palpation  Psych: normal affect     Investigations:      Laboratory Testing:  Recent Results (from the past 24 hour(s))   CBC with Auto Differential    Collection Time: 23 11:00 AM   Result Value Ref Range    WBC 11.2 (H) 3.5 - 11.0 k/uL    RBC 3.90 (L) 4.0 - 5.2 m/uL    Hemoglobin 12.1 12.0 - 16.0 g/dL    Hematocrit 36.6 36 - 46 %    MCV 93.8 80 - 100 fL    MCH 31.0 26 - 34 pg    MCHC 33.1 31 - 37 g/dL    RDW 14.5 12.5 - 15.4 %    Platelets 187 463 - 816 k/uL    MPV 10.2 8.0 - 14.0 fL    Seg Neutrophils 71 (H) 36 - 66 %    Lymphocytes 21 (L) 24 - 44 %    Monocytes 7 2 - 11 %    Eosinophils % 1 1 - 4 %    Basophils 0 0 - 2 %    Segs Absolute 7.88 (H) 1.8 - 7.7 k/uL    Absolute Lymph # 2.39 1.0 - 4.8 k/uL    Absolute Mono # 0.74 0.1 - 1.2 k/uL    Absolute Eos # 0.16 0.0 - 0.4 k/uL    Basophils Absolute 0.02 0.0 - 0.2 k/uL   Comprehensive Metabolic Panel w/ Reflex to MG    Collection Time: 02/13/23 11:00 AM   Result Value Ref Range    Glucose 134 (H) 70 - 99 mg/dL    BUN 14 8 - 23 mg/dL    Creatinine 1.13 (H) 0.50 - 0.90 mg/dL    Est, Glom Filt Rate 52 (L) >60 mL/min/1.73m2    Calcium 10.0 8.6 - 10.4 mg/dL    Sodium 139 135 - 144 mmol/L    Potassium 3.1 (L) 3.7 - 5.3 mmol/L    Chloride 96 (L) 98 - 107 mmol/L    CO2 30 20 - 31 mmol/L    Anion Gap 13 9 - 17 mmol/L    Alkaline Phosphatase 104 35 - 104 U/L    ALT 7 5 - 33 U/L    AST 12 <32 U/L    Total Bilirubin 0.8 0.3 - 1.2 mg/dL    Total Protein 7.5 6.4 - 8.3 g/dL    Albumin 4.3 3.5 - 5.2 g/dL    Albumin/Globulin Ratio 1.3 1.0 - 2.5   Troponin    Collection Time: 02/13/23 11:00 AM   Result Value Ref Range    Troponin, High Sensitivity 68 (HH) 0 - 14 ng/L   Magnesium    Collection Time: 02/13/23 11:00 AM   Result Value Ref Range    Magnesium 1.8 1.6 - 2.6 mg/dL   Urinalysis with Reflex to Culture    Collection Time: 02/13/23 11:55 AM    Specimen: Urine   Result Value Ref Range    Color, UA Yellow Yellow    Turbidity UA Clear Clear    Glucose, Ur NEGATIVE NEGATIVE    Bilirubin Urine NEGATIVE NEGATIVE    Ketones, Urine NEGATIVE NEGATIVE    Specific Gravity, UA 1.010 1.005 - 1.030    Urine Hgb NEGATIVE NEGATIVE    pH, UA 6.0 5.0 - 8.0    Protein, UA NEGATIVE NEGATIVE    Urobilinogen, Urine Normal Normal    Nitrite, Urine NEGATIVE NEGATIVE    Leukocyte Esterase, Urine TRACE (A) NEGATIVE   Microscopic Urinalysis    Collection Time: 02/13/23 11:55 AM   Result Value Ref Range    WBC, UA 2 TO 5 0 - 5 /HPF    RBC, UA 0 TO 2 0 - 2 /HPF    Casts UA 2 TO 5 /LPF    Casts UA HYALINE /LPF    Crystals, UA FEW CALCIUM OXALATE (A) None /HPF    Epithelial Cells UA 5 TO 10 0 - 5 /HPF    Bacteria, UA FEW (A) None    Other Observations UA (A) NOT REQ.      Utilizing a urinalysis as the only screening method to exclude a potential uropathogen can be unreliable in many patient populations. Rapid screening tests are less sensitive than culture and if UTI is a clinical possibility, culture should be considered despite a negative urinalysis. Troponin    Collection Time: 02/13/23 12:55 PM   Result Value Ref Range    Troponin, High Sensitivity 62 (HH) 0 - 14 ng/L       Imaging/Diagnostics:  XR CHEST PA LATERAL W BOTH OBLIQUE PROJECTIONS    Result Date: 2/13/2023  1. No radiographic evidence for pneumothorax. A small pneumothorax could potentially be occult on this exam.  If this remains a clinical concern CT scan would be advised. 2. No radiographic evidence for acute left rib fracture.        Assessment :      Hospital Problems             Last Modified POA    * (Principal) Syncope and collapse 2/13/2023 Yes    Chronic atrial fibrillation ([address] 2/13/2023 Yes    Hypothyroidism 2/13/2023 Yes    Hyperlipidemia 2/13/2023 Yes    Chronic bronchitis ([address] 2/13/2023 Yes    Elevated troponin 2/13/2023 Yes    Disorder of carotid artery ([address] 2/13/2023 Yes       Plan:     Patient status inpatient in the Med/Surge    Syncope and collapse   -It is unclear if the patient truly had a syncopal episode or if she experienced a mechanical fall as the fall occurred in the middle of the night and the patient states that she was very drowsy at the time (patient takes nightly Xanax)   -Echocardiogram pending   -Telemetry monitoring   -Orthostatic vital signs   -Bilateral carotid dopplers pending   -Consult cardiology; appreciate recommendations     Elevated troponin   -Troponin was noted to be elevated at 68 in the ED with no baseline to compare to   -Continue to trend (currently trending down)   -Echocardiogram as above   -Cardiology consult as above     Persistent atrial fibrillation   -Rate-controlled   -Continue home Xarelto     COPD  -Continue home roflumilast   -Continue BID Symbicort   -Patient states that she stopped smoking cigarettes and started vaping about a month ago     Hypothyroidism   -Continue home levothyroxine     Hypertension   -Continue home losartan 50 mg daily   -Continue home amlodipine 5 mg daily     Hyperlipidemia   -Continue home rosuvastatin     Consultations:   IP CONSULT TO SOCIAL WORK  IP CONSULT TO CARDIOLOGY    Patient is admitted as inpatient status because of co-morbidities listed above, severity of signs and symptoms as outlined, requirement for current medical therapies and most importantly because of direct risk to patient if care not provided in a hospital setting. Expected length of stay > 48 hours.     Tho Mcconnell MD  2/13/2023  1:56 PM    Copy sent to Dr. Susie Mejia MD

## 2023-02-14 LAB
ANION GAP SERPL CALCULATED.3IONS-SCNC: 9 MMOL/L (ref 9–17)
BUN SERPL-MCNC: 15 MG/DL (ref 8–23)
CALCIUM SERPL-MCNC: 9.7 MG/DL (ref 8.6–10.4)
CHLORIDE SERPL-SCNC: 97 MMOL/L (ref 98–107)
CO2 SERPL-SCNC: 32 MMOL/L (ref 20–31)
CREAT SERPL-MCNC: 1.13 MG/DL (ref 0.5–0.9)
EKG ATRIAL RATE: 76 BPM
EKG Q-T INTERVAL: 358 MS
EKG QRS DURATION: 88 MS
EKG QTC CALCULATION (BAZETT): 452 MS
EKG R AXIS: 24 DEGREES
EKG T AXIS: 119 DEGREES
EKG VENTRICULAR RATE: 96 BPM
GFR SERPL CREATININE-BSD FRML MDRD: 52 ML/MIN/1.73M2
GLUCOSE SERPL-MCNC: 99 MG/DL (ref 70–99)
MAGNESIUM SERPL-MCNC: 1.9 MG/DL (ref 1.6–2.6)
POTASSIUM SERPL-SCNC: 3.7 MMOL/L (ref 3.7–5.3)
SODIUM SERPL-SCNC: 138 MMOL/L (ref 135–144)

## 2023-02-14 PROCEDURE — 97162 PT EVAL MOD COMPLEX 30 MIN: CPT

## 2023-02-14 PROCEDURE — 94660 CPAP INITIATION&MGMT: CPT

## 2023-02-14 PROCEDURE — 80048 BASIC METABOLIC PNL TOTAL CA: CPT

## 2023-02-14 PROCEDURE — 51798 US URINE CAPACITY MEASURE: CPT

## 2023-02-14 PROCEDURE — 36415 COLL VENOUS BLD VENIPUNCTURE: CPT

## 2023-02-14 PROCEDURE — 94760 N-INVAS EAR/PLS OXIMETRY 1: CPT

## 2023-02-14 PROCEDURE — 99232 SBSQ HOSP IP/OBS MODERATE 35: CPT | Performed by: HOSPITALIST

## 2023-02-14 PROCEDURE — 2060000000 HC ICU INTERMEDIATE R&B

## 2023-02-14 PROCEDURE — 6370000000 HC RX 637 (ALT 250 FOR IP): Performed by: NURSE PRACTITIONER

## 2023-02-14 PROCEDURE — 97166 OT EVAL MOD COMPLEX 45 MIN: CPT

## 2023-02-14 PROCEDURE — 94640 AIRWAY INHALATION TREATMENT: CPT

## 2023-02-14 PROCEDURE — 97535 SELF CARE MNGMENT TRAINING: CPT

## 2023-02-14 PROCEDURE — 2580000003 HC RX 258: Performed by: STUDENT IN AN ORGANIZED HEALTH CARE EDUCATION/TRAINING PROGRAM

## 2023-02-14 PROCEDURE — 6370000000 HC RX 637 (ALT 250 FOR IP): Performed by: HOSPITALIST

## 2023-02-14 PROCEDURE — 97116 GAIT TRAINING THERAPY: CPT

## 2023-02-14 PROCEDURE — 6370000000 HC RX 637 (ALT 250 FOR IP): Performed by: STUDENT IN AN ORGANIZED HEALTH CARE EDUCATION/TRAINING PROGRAM

## 2023-02-14 PROCEDURE — 83735 ASSAY OF MAGNESIUM: CPT

## 2023-02-14 RX ORDER — LOSARTAN POTASSIUM 25 MG/1
25 TABLET ORAL DAILY
Status: DISCONTINUED | OUTPATIENT
Start: 2023-02-14 | End: 2023-02-15

## 2023-02-14 RX ADMIN — RIVAROXABAN 20 MG: 10 TABLET, FILM COATED ORAL at 09:24

## 2023-02-14 RX ADMIN — ALBUTEROL SULFATE 2 PUFF: 90 AEROSOL, METERED RESPIRATORY (INHALATION) at 10:37

## 2023-02-14 RX ADMIN — FERROUS SULFATE TAB EC 325 MG (65 MG FE EQUIVALENT) 325 MG: 325 (65 FE) TABLET DELAYED RESPONSE at 09:24

## 2023-02-14 RX ADMIN — FUROSEMIDE 20 MG: 20 TABLET ORAL at 09:25

## 2023-02-14 RX ADMIN — PREGABALIN 150 MG: 75 CAPSULE ORAL at 09:25

## 2023-02-14 RX ADMIN — OXYCODONE HYDROCHLORIDE AND ACETAMINOPHEN 2 TABLET: 5; 325 TABLET ORAL at 09:23

## 2023-02-14 RX ADMIN — ALBUTEROL SULFATE 2 PUFF: 90 AEROSOL, METERED RESPIRATORY (INHALATION) at 20:05

## 2023-02-14 RX ADMIN — METOPROLOL TARTRATE 25 MG: 25 TABLET, FILM COATED ORAL at 20:08

## 2023-02-14 RX ADMIN — ROFLUMILAST 500 MCG: 500 TABLET ORAL at 09:24

## 2023-02-14 RX ADMIN — OXYCODONE HYDROCHLORIDE AND ACETAMINOPHEN 2 TABLET: 5; 325 TABLET ORAL at 04:18

## 2023-02-14 RX ADMIN — SODIUM CHLORIDE, PRESERVATIVE FREE 10 ML: 5 INJECTION INTRAVENOUS at 20:09

## 2023-02-14 RX ADMIN — LOSARTAN POTASSIUM 25 MG: 25 TABLET, FILM COATED ORAL at 09:24

## 2023-02-14 RX ADMIN — BUDESONIDE AND FORMOTEROL FUMARATE DIHYDRATE 1 PUFF: 160; 4.5 AEROSOL RESPIRATORY (INHALATION) at 10:37

## 2023-02-14 RX ADMIN — ROSUVASTATIN CALCIUM 10 MG: 5 TABLET, COATED ORAL at 09:25

## 2023-02-14 RX ADMIN — BUDESONIDE AND FORMOTEROL FUMARATE DIHYDRATE 1 PUFF: 160; 4.5 AEROSOL RESPIRATORY (INHALATION) at 20:05

## 2023-02-14 RX ADMIN — PREGABALIN 150 MG: 75 CAPSULE ORAL at 20:08

## 2023-02-14 RX ADMIN — ASPIRIN 81 MG: 81 TABLET, COATED ORAL at 09:24

## 2023-02-14 RX ADMIN — PANTOPRAZOLE SODIUM 40 MG: 40 TABLET, DELAYED RELEASE ORAL at 05:46

## 2023-02-14 RX ADMIN — METOPROLOL TARTRATE 25 MG: 25 TABLET, FILM COATED ORAL at 10:48

## 2023-02-14 RX ADMIN — CETIRIZINE HYDROCHLORIDE 10 MG: 10 TABLET, FILM COATED ORAL at 09:24

## 2023-02-14 RX ADMIN — SODIUM CHLORIDE, PRESERVATIVE FREE 10 ML: 5 INJECTION INTRAVENOUS at 09:49

## 2023-02-14 RX ADMIN — LEVOTHYROXINE SODIUM 50 MCG: 50 TABLET ORAL at 05:46

## 2023-02-14 RX ADMIN — OXYCODONE HYDROCHLORIDE AND ACETAMINOPHEN 2 TABLET: 5; 325 TABLET ORAL at 20:08

## 2023-02-14 ASSESSMENT — PAIN DESCRIPTION - DESCRIPTORS
DESCRIPTORS: SHARP
DESCRIPTORS: SHARP

## 2023-02-14 ASSESSMENT — PAIN DESCRIPTION - ORIENTATION
ORIENTATION: RIGHT
ORIENTATION: RIGHT

## 2023-02-14 ASSESSMENT — PAIN SCALES - GENERAL
PAINLEVEL_OUTOF10: 5
PAINLEVEL_OUTOF10: 10

## 2023-02-14 ASSESSMENT — PAIN - FUNCTIONAL ASSESSMENT: PAIN_FUNCTIONAL_ASSESSMENT: ACTIVITIES ARE NOT PREVENTED

## 2023-02-14 ASSESSMENT — PAIN SCALES - WONG BAKER
WONGBAKER_NUMERICALRESPONSE: 0
WONGBAKER_NUMERICALRESPONSE: 2

## 2023-02-14 ASSESSMENT — PAIN DESCRIPTION - LOCATION
LOCATION: RIB CAGE
LOCATION: BACK

## 2023-02-14 NOTE — PROGRESS NOTES
Occupational Therapy  Facility/Department: [facility]  Occupational Therapy Initial Assessment    Name: Danuta Amato  : 1950  MRN: [id number]  Date of Service: 2023    Chief Complaint   Patient presents with    Fall     Discharge Recommendations:  Patient would benefit from continued therapy after discharge       Patient Diagnosis(es): The encounter diagnosis was Syncope and collapse. Past Medical History:  has a past medical history of A-fib ([facility] Anxiety, Arthritis, Blood transfusion reaction, CAD (coronary artery disease), Carotid stenosis, COPD (chronic obstructive pulmonary disease) ([address] GERD (gastroesophageal reflux disease), Hepatitis B, History of blood transfusion, Hyperlipidemia, Hypertension, Hypothyroid, Kidney disease, Legally blind, Macular edema, PVD (peripheral vascular disease) ([facility] Sleep apnea, and Subclavian artery stenosis ([facility] Past Surgical History:  has a past surgical history that includes Aorto-femoral Bypass Graft (); tumor removal (Left); Tonsillectomy; Toe amputation (); Cataract removal (Bilateral); Cholecystectomy (2014); Carotid endarterectomy; Cardiac catheterization (10/2012); eye surgery; eye surgery; Carotid endarterectomy (Left, 2019); Carotid endarterectomy (Left, 2019); and vascular surgery (N/A, 2022). Assessment   Performance deficits / Impairments: Decreased functional mobility ; Decreased ADL status; Decreased safe awareness;Decreased cognition;Decreased balance;Decreased high-level IADLs;Decreased endurance;Decreased strength  Assessment: Pt currently presents with above noted deficits limiting pt's ability to safely and independently engage in ADL tasks including functional transfers/functional mobility required for ADL performance.  Pt requires mod VCs throughout session due to decreased cognition/safety awareness and CGA for engagement in functional transfers/functional mobility and toileting/grooming tasks. Continued acute care OT services to maximize pt's safety and independence in performing functional tasks. Pt to require 24hr supervision/assistance and continued skilled therapy intervention at discharge. Prognosis: Good;Fair  Decision Making: Medium Complexity  REQUIRES OT FOLLOW-UP: Yes  Activity Tolerance  Activity Tolerance: Patient limited by fatigue;Patient limited by pain      Safety Devices  Type of Devices: Call light within reach; Chair alarm in place; Left in chair;Nurse notified  Restraints  Restraints Initially in Place: No    Plan   Occupational Therapy Plan  Times Per Week: 5-6x/wk  Current Treatment Recommendations: Balance training, Functional mobility training, Endurance training, Equipment evaluation, education, & procurement, Patient/Caregiver education & training, Self-Care / ADL, Home management training, Strengthening, Safety education & training     Restrictions  Restrictions/Precautions  Restrictions/Precautions: Fall Risk  Required Braces or Orthoses?: No  Position Activity Restriction  Other position/activity restrictions: Up with assistance    Subjective   General  Patient assessed for rehabilitation services?: Yes  Family / Caregiver Present: No  General Comment  Comments: RN ok'd for therapy this AM. Pt agreeable to participate in session following encouragement, pt cooperative throughout however requires frequent redirection/encouragement due to easily distracted. Pt reports 3/10 pain to low back and states this pain is chronic.        Social/Functional History  Social/Functional History  Lives With: Family (29 yo grandson who does not work and is able to help her)  Type of Home: Mobile home  Home Layout: One level  Home Access: Ramped entrance (also has stairs)  Bathroom Shower/Tub: Walk-in shower (also has a jacuzzi tub that she is not able to use, as it is raised and she can't step up)  Bathroom Toilet: Standard (has platform around toilet that she step onto)  [place] Equipment: Shower chair, Grab bars around toilet  Home Equipment: Waqas Khan, 4 wheeled, Walker, rolling, Wheelchair-electric (uses 4 wheeled walker when outside of the house only)  Has the patient had two or more falls in the past year or any fall with injury in the past year?: Yes (reports a fall in April where she broke her humerus)  Receives Help From: Family  ADL Assistance: Needs assistance  Bath: Modified independent  Dressing: Modified independent  Grooming: Independent  Feeding: Independent  Toileting: Independent  Homemaking Assistance: Needs assistance (the patient states that she is responsible for all homemaking, however this is very challenging and many things are not getting done)  Meal Prep:  (14 mobile meals delivered per week)  Homemaking Responsibilities: Yes (grandson helps, but the patient also is responsible for these)  Meal Prep Responsibility: Secondary  Laundry Responsibility: Secondary  Cleaning Responsibility: Secondary  Bill Paying/Finance Responsibility: Primary  Shopping Responsibility: Primary  Health Care Management: Primary  Ambulation Assistance: Independent (4 wheeled walker when outside of the house; no device in the home - reports that she holds onto walls and furniture)  Transfer Assistance: Independent  Active : No  Patient's  Info: family, transport service    Objective   Balance  Sitting:  (SBA- static, min A- dynamic)  Standing:  (CGA- static, min A- dynamic)    Functional Mobility   Overall Level of Assistance: Contact-guard assistance; Additional time (Pt performed functiona mobility to/from bathroom and within hospital room/hallway with CGA and use of rollator. Increased time/effort as pt with slow pace and quick to fatigue with reports of increased low back pain. Unsteady.  Impulsive.)  Interventions: Safety awareness training;Verbal cues (Mod VCs for rollator mngt/navigation, safety awareness, problem solving, and attention to task)  Assistive Device: Waqas Khan rollator    Toilet Transfers  Toilet - Technique: Ambulating (with use of rollator)  Equipment Used: Standard toilet (with use of grab bars)  Toilet Transfer: Contact guard assistance    AROM: Within functional limits (BUE)  Strength: Generally decreased, functional (BUE)  Coordination: Generally decreased, functional (BUE FMC/GMC)  Sensation: Impaired (Pt reports numbness/tingling to BLE at baseline secondary to neuropathy)    ADL  Feeding: Setup; Increased time to complete  Grooming: Increased time to complete;Contact guard assistance (standing sink side to perform hand hygiene)  UE Bathing: Increased time to complete;Contact guard assistance  LE Bathing: Increased time to complete;Minimal assistance  UE Dressing: Increased time to complete;Contact guard assistance  LE Dressing: Minimal assistance; Increased time to complete  Toileting: Minimal assistance; Increased time to complete (for toilet transfer)    Bed mobility  Supine to Sit: Minimal assistance (HOB raised ~30* and use of bed rail)  Sit to Supine:  (pt retired up to chair at end of session)  Scooting: Contact guard assistance  Bed Mobility Comments: Min VCs for initiation/problem solving/attention to task; increased time/effort to perform. Transfers  Sit to stand: Contact guard assistance  Stand to sit: Contact guard assistance  Transfer Comments: Mod VCs for proper hand placement/sequencing/safety awareness with use of rollator; increased time/effort to perform    Vision  Vision: Impaired  Vision Exceptions: Wears glasses at all times  Hearing  Hearing: Exceptions to [facility]  Hearing Exceptions: Bilateral hearing aid;Hard of hearing/hearing concerns (pt does not have hearing aids at the hospital)    Cognition  Overall Cognitive Status: Exceptions  Arousal/Alertness: Delayed responses to stimuli  Following Commands: Follows multistep commands with repitition; Follows multistep commands with increased time  Attention Span: Attends with cues to redirect; Difficulty attending to directions  Safety Judgement: Decreased awareness of need for assistance  Problem Solving: Assistance required to generate solutions;Assistance required to implement solutions;Assistance required to identify errors made  Insights: Decreased awareness of deficits  Initiation: Requires cues for some  Sequencing: Requires cues for some        Education Provided: Role of Therapy;Plan of Care (Activity Promotion, Safety with Transfers/Use of Rollator, Bed Mobility Techniques, Safety Awareness/Fall Prevention)  Education Method: Demonstration;Verbal  Barriers to Learning: Cognition; Hearing  Education Outcome: Verbalized understanding;Continued education needed       AM-PAC Score   AM-PAC Inpatient Daily Activity Raw Score: 19 (02/14/23 1245)  AM-PAC Inpatient ADL T-Scale Score : 40.22 (02/14/23 1245)  ADL Inpatient CMS 0-100% Score: 42.8 (02/14/23 1245)  ADL Inpatient CMS G-Code Modifier : CK (02/14/23 1245)    Goals  Short Term Goals  Time Frame for Short Term Goals: 14 visits  Short Term Goal 1: Pt will perform ADL tasks with mod IND using AE/DME PRN  Short Term Goal 2: Pt will perform functional transfers/functional mobility with mod IND using least restrictive AD  Short Term Goal 3: Pt will independently demo good safety awareness during engagement in all ADLs and functional transfers/functional mobility  Short Term Goal 4: Pt will demo 10+ minutes BUE HEP for improved BUE strength/activity tolerance during engagement in functional tasks       Therapy Time   Individual Concurrent Group Co-treatment   Time In 0957         Time Out 1034         Minutes 37         Timed Code Treatment Minutes: [address] [facility]       Carrie Pace, OTR/L

## 2023-02-14 NOTE — PROGRESS NOTES
[facility]  Office: [address], DO, Lee Prophet, DO, Tammi Gambino, DO, Marlon Gregorio Blood, DO, Lester Hebert MD, Afshan Shah MD, Babita Salter MD, Reji Crowley MD,  Jeanie Larios MD, Gume Carrasco MD, Luis Carrasco, DO, Louisa Juarez MD,  Mae Viera MD, Marry Rubin MD, Emily Best, DO, Barbara Galicia MD, Maria Fernanda Shaw MD, Joce Sharp, DO, Nahum Stein MD, Orin Garg MD, Mike Garibay MD, Mark Short MD, Delfina Espinoza DO, Elias Yuen MD, Huang Issa MD, Cresencio Coronel, Mat Ndiaye, CNP, Francine Sanchez, CNP, July Bee, CNP,  Nubia Hurst, [facility], Keri Boone, CNP, Janeth Hauser, CNP, Elva Poole, CNP, Sivan Traore, CNP, Tay Monique, CNP, ОЛЬГА HeadC, Sugar Carpio, CNS, Elle Wood, CNP, [address]    Progress Note    2/14/2023    2:49 PM    Name:   Padmini Velarde  MRN:     [id number]     Acct:      [de-identified]   Room:   [address] [place] [address] Day:  1  Admit Date:  2/13/2023 10:41 AM    PCP:   Shannon Singh MD  Code Status:  Full Code    Subjective:     C/C:   Chief Complaint   Patient presents with    Fall     Patient seen in follow-up for syncope and fall, patient states \"I am ok\"    Interval History Status: improved. Patient is doing reasonably well. Echocardiogram is pending. Blood pressure was slightly low earlier today and I have titrated her antihypertensive medications. I do suspect that the patient's syncopal event was secondary to hypotension. Providing her echocardiogram is unremarkable I have no objection to discharge home with further work-up and titration of antihypertensive medications as an outpatient. Patient denies any questions or concerns at this point in time.     Brief History:     Padmini Velarde is a 81 y.o. female with a past medical history of persistent atrial fibrillation (on Xarelto), COPD, hypothyroidism, hypertension, hyperlipidemia and tobacco abuse who presented to the emergency department on 2/13/2023 complaining of a fall and rib pain. The patient states that she was having a sleep study done last night and woke up in the middle of the night to use the restroom. She states that she fell on her way to the restroom but does not remember how she fell (patient states she was very drowsy at the time). The patient states that she realized she had fallen when people were standing around her. She denies headache or chest pain prior to the fall and did not hit her head during the fall. She decided to come to the emergency department this morning due to persistent rib pain. In the ED, the patient was afebrile and nontoxic appearing. Chest x-ray was unremarkable and CBC and BMP were at baseline for the patient. Troponin was found to be elevated at 68 with no baseline to compare to. The patient is admitted to internal medicine for further management of presumed syncope and collapse. Review of Systems:     Constitutional:  negative for chills, fevers, sweats  Respiratory:  negative for cough, dyspnea on exertion, shortness of breath, wheezing  Cardiovascular:  negative for chest pain, chest pressure/discomfort, lower extremity edema, palpitations  Gastrointestinal:  negative for abdominal pain, constipation, diarrhea, nausea, vomiting  Neurological:  negative for dizziness, headache    Medications: Allergies:     Allergies   Allergen Reactions    Iv Contrast [Iodides] Other (See Comments)     Due to kidney problems       Current Meds:   Scheduled Meds:    losartan  25 mg Oral Daily    metoprolol tartrate  25 mg Oral BID    aspirin  81 mg Oral Daily    ferrous sulfate  325 mg Oral Daily    cetirizine  10 mg Oral Daily    furosemide  20 mg Oral Daily    levothyroxine  50 mcg Oral Daily    pantoprazole  40 mg Oral QAM AC    pregabalin  150 mg Oral BID    Roflumilast  500 mcg Oral Daily rosuvastatin  10 mg Oral Daily    budesonide-formoterol  1 puff Inhalation BID    rivaroxaban  20 mg Oral Daily with breakfast    sodium chloride flush  5-40 mL IntraVENous 2 times per day    albuterol sulfate HFA  2 puff Inhalation BID     Continuous Infusions:    sodium chloride 75 mL/hr at 23 1501    sodium chloride       PRN Meds: albuterol sulfate HFA, ALPRAZolam, sodium chloride flush, sodium chloride, potassium chloride **OR** potassium alternative oral replacement **OR** potassium chloride, magnesium sulfate, ondansetron **OR** ondansetron, polyethylene glycol, acetaminophen **OR** acetaminophen, oxyCODONE-acetaminophen    Data:     Past Medical History:   has a past medical history of [facility]fib ([facility] Anxiety, Arthritis, Blood transfusion reaction, CAD (coronary artery disease), Carotid stenosis, COPD (chronic obstructive pulmonary disease) ([facility] GERD (gastroesophageal reflux disease), Hepatitis B, History of blood transfusion, Hyperlipidemia, Hypertension, Hypothyroid, Kidney disease, Legally blind, Macular edema, PVD (peripheral vascular disease) ([facility] Sleep apnea, and Subclavian artery stenosis ([facility] Social History:   reports that she has been smoking. She has been smoking an average of 1 pack per day. She has quit using smokeless tobacco. She reports that she does not drink alcohol and does not use drugs.      Family History:   Family History   Problem Relation Age of Onset    Heart Attack Father     Stroke Father     High Blood Pressure Father     Heart Disease Father     Cancer Mother         Multiple myeloma    Cancer Sister         Lung with mets to brain    Cancer Maternal Aunt         Several aunts had unknown cancer    Heart Attack Brother     Liver Disease Brother     Heart Defect Sister        Vitals:  BP (!) 108/56   Pulse 99   Temp 97.8 °F (36.6 °C) (Oral)   Resp 19   Ht 5' 3\" (1.6 m)   Wt 169 lb 12.1 oz (77 kg)   SpO2 90%   BMI 30.07 kg/m²   Temp (24hrs), Av.9 °F (36.6 °C), Min:97.7 °F (36.5 °C), Max:98 °F (36.7 °C)    No results for input(s): POCGLU in the last 72 hours. I/O (24Hr): Intake/Output Summary (Last 24 hours) at 2/14/2023 1449  Last data filed at 2/14/2023 1015  Gross per 24 hour   Intake --   Output 275 ml   Net -275 ml       Labs:  Hematology:  Recent Labs     02/13/23  1100   WBC 11.2*   RBC 3.90*   HGB 12.1   HCT 36.6   MCV 93.8   MCH 31.0   MCHC 33.1   RDW 14.5      MPV 10.2     Chemistry:  Recent Labs     02/13/23  1100 02/13/23  1255 02/13/23  1522 02/13/23  1839 02/14/23  0527     --   --   --  138   K 3.1*  --   --   --  3.7   CL 96*  --   --   --  97*   CO2 30  --   --   --  32*   GLUCOSE 134*  --   --   --  99   BUN 14  --   --   --  15   CREATININE 1.13*  --   --   --  1.13*   MG 1.8  --   --   --  1.9   ANIONGAP 13  --   --   --  9   LABGLOM 52*  --   --   --  52*   CALCIUM 10.0  --   --   --  9.7   PROBNP  --   --  440*  --   --    TROPHS 68* 62* 54* 53*  --      Recent Labs     02/13/23  1100   PROT 7.5   LABALBU 4.3   AST 12   ALT 7   ALKPHOS 104   BILITOT 0.8     ABG:No results found for: POCPH, PHART, PH, POCPCO2, XDA3QFT, PCO2, POCPO2, PO2ART, PO2, POCHCO3, BIZ7RCC, HCO3, NBEA, PBEA, BEART, BE, THGBART, THB, ZUM7ICG, ZTDB2KMO, E6OKQWCB, O2SAT, FIO2  No results found for: SPECIAL  Lab Results   Component Value Date/Time    CULTURE KLEBSIELLA PNEUMONIAE >[id number] CFU/ML (A) 10/06/2022 09:03 AM       Radiology:  XR CHEST PA LATERAL W BOTH OBLIQUE PROJECTIONS    Result Date: 2/13/2023  1. No radiographic evidence for pneumothorax. A small pneumothorax could potentially be occult on this exam.  If this remains a clinical concern CT scan would be advised. 2. No radiographic evidence for acute left rib fracture.        Physical Examination:       General appearance:  alert, cooperative and no distress  Mental Status:  oriented to person, place and time and normal affect  Lungs:  clear to auscultation bilaterally, normal effort  Heart: Irregular rhythm, rate controlled  Abdomen:  soft, nontender, nondistended, normal bowel sounds, no masses, hepatomegaly, splenomegaly  Extremities:  no edema, redness, tenderness in the calves  Skin:  no gross lesions, rashes, induration    Assessment:     Hospital Problems             Last Modified POA    * (Principal) Syncope and collapse 2/13/2023 Yes    Chronic atrial fibrillation ([address] 2/13/2023 Yes    Hypothyroidism 2/13/2023 Yes    Hyperlipidemia 2/13/2023 Yes    Chronic bronchitis ([address] 2/13/2023 Yes    Elevated troponin 2/13/2023 Yes    Disorder of carotid artery ([facility] 2/13/2023 Yes       Plan:     Syncope  Suspect secondary to suspect secondary to hypotension  DC Norvasc, decrease Cozaar to 25mg  Await echo  Elevated troponin  Await echo  Chronic atrial fibrillation  Rate controlled, continue Lopressor/Xarelto  COPD  Stable, no wheezing  Hypothyroidism  Continue Synthroid  Essential hypertension  DC Norvasc and decrease Cozaar due to relative hypotension  Hyperlipidemia  Continue statin    Discharge pending echocardiogram and echocardiogram results    Prabhjot Rider DO  2/14/2023  2:49 PM

## 2023-02-14 NOTE — CONSULTS
[facility] Cardiology Cardiology    Consult                        Today's Date: 2/14/2023  Patient Name: Yeimy Short  Date of admission: 2/13/2023 10:41 AM  Patient's age: 81 y. o., 1950  Admission Dx: Syncope and collapse [R55]    Reason for Consult:  Cardiac evaluation    Requesting Physician: Virgen Roldan MD    CHIEF COMPLAINT:  Syncope     History Obtained From:  patient, electronic medical record    HISTORY OF PRESENT ILLNESS:      Yeimy Short is a 81 y.o. female with a past medical history of persistent atrial fibrillation (on Xarelto), COPD, hypothyroidism, hypertension, hyperlipidemia and tobacco abuse who presented to the emergency department on 2/13/2023 complaining of a fall and rib pain. The patient states that she was having a sleep study done last night and woke up in the middle of the night to use the restroom. She states that she fell on her way to the restroom but does not remember how she fell (patient states she was very drowsy at the time). The patient states that she realized she had fallen when people were standing around her. She denies headache or chest pain prior to the fall and did not hit her head during the fall. She decided to come to the emergency department this morning due to persistent rib pain. In the ED, the patient was afebrile and nontoxic appearing. Chest x-ray was unremarkable and CBC and BMP were at baseline for the patient. Troponin was found to be elevated at 68 with no baseline to compare to. The patient is admitted to internal medicine for further management of presumed syncope and collapse. Pt seen and examined in the room, alone. Pt denies any dizziness or lightheadedness. She states that she only uses a walker for long distances. She denies any bleeding issues from xarelto. She follows with Dr Harshal Washington at the [facility].       Past Medical History:   has a past medical history of A-fib ([address] Anxiety, Arthritis, Blood transfusion reaction, CAD (coronary artery disease), Carotid stenosis, COPD (chronic obstructive pulmonary disease) ([facility] GERD (gastroesophageal reflux disease), Hepatitis B, History of blood transfusion, Hyperlipidemia, Hypertension, Hypothyroid, Kidney disease, Legally blind, Macular edema, PVD (peripheral vascular disease) ([facility] Sleep apnea, and Subclavian artery stenosis ([facility] Past Surgical History:   has a past surgical history that includes Aorto-femoral Bypass Graft (2001); tumor removal (Left); Tonsillectomy; Toe amputation (2001); Cataract removal (Bilateral); Cholecystectomy (05/05/2014); Carotid endarterectomy; Cardiac catheterization (10/2012); eye surgery; eye surgery; Carotid endarterectomy (Left, 05/16/2019); Carotid endarterectomy (Left, 5/16/2019); and vascular surgery (N/A, 9/23/2022). Home Medications:    Prior to Admission medications    Medication Sig Start Date End Date Taking? Authorizing Provider   ALPRAZolam Donna Balboa) 1 MG tablet Take 1 mg by mouth nightly as needed for Sleep. Yes Historical Provider, MD   ferrous sulfate (IRON 325) 325 (65 Fe) MG tablet Take 325 mg by mouth daily 11/8/22   Historical Provider, MD   rosuvastatin (CRESTOR) 10 MG tablet Take 10 mg by mouth daily 10/18/21   Historical Provider, MD   Roflumilast (DALIRESP) 500 MCG tablet Take 500 mcg by mouth daily    Historical Provider, MD   XARELTO 20 MG TABS tablet Take 20 mg by mouth daily 9/19/22   Historical Provider, MD   amLODIPine (NORVASC) 5 MG tablet Take 5 mg by mouth daily    Historical Provider, MD   [facility] 160-4.5 MCG/ACT AERO  9/23/21   Historical Provider, MD   calcium carbonate 1500 (600 Ca) MG TABS tablet  9/20/21   Historical Provider, MD   furosemide (LASIX) 40 MG tablet Take 20 mg by mouth daily 9/21/21   Historical Provider, MD   pregabalin (LYRICA) 150 MG capsule Take 150 mg by mouth 2 times daily.     Historical Provider, MD   alendronate (FOSAMAX) 35 MG tablet Take 35 mg by mouth every 7 days    Historical Provider, MD aspirin 81 MG EC tablet Take 81 mg by mouth daily    Historical Provider, MD   varenicline (CHANTIX) 1 MG tablet Take 1 mg by mouth 2 times daily  Patient not taking: Reported on 1/19/2023    Historical Provider, MD   fexofenadine (ALLEGRA) 180 MG tablet Take 180 mg by mouth daily    Historical Provider, MD   losartan (COZAAR) 50 MG tablet Take 50 mg by mouth daily    Historical Provider, MD   levothyroxine (SYNTHROID) 50 MCG tablet Take 50 mcg by mouth Daily. Historical Provider, MD   omeprazole (PRILOSEC) 40 MG delayed release capsule Take 40 mg by mouth daily. Historical Provider, MD   albuterol (PROVENTIL HFA;VENTOLIN HFA) 108 (90 BASE) MCG/ACT inhaler Inhale 2 puffs into the lungs every 4 hours as needed for Wheezing. Historical Provider, MD   folic acid (FOLVITE) 1 MG tablet Take 1 mg by mouth daily. Historical Provider, MD   Cholecalciferol (VITAMIN D) 2000 UNITS TABS Take  by mouth. Historical Provider, MD       Allergies: Iv contrast [iodides]    Social History:   reports that she has been smoking. She has been smoking an average of 1 pack per day. She has quit using smokeless tobacco. She reports that she does not drink alcohol and does not use drugs. Family History: family history includes Cancer in her maternal aunt, mother, and sister; Heart Attack in her brother and father; Heart Defect in her sister; Heart Disease in her father; High Blood Pressure in her father; Liver Disease in her brother; Stroke in her father. No h/o sudden cardiac death. No for premature CAD    REVIEW OF SYSTEMS:    Constitutional: there has been no unanticipated weight loss. There's been No change in energy level, No change in activity level. Eyes: No visual changes or diplopia. No scleral icterus. ENT: No Headaches, hearing loss or vertigo. No mouth sores or sore throat. Cardiovascular: see above  Respiratory: see above  Gastrointestinal: No abdominal pain, appetite loss, blood in stools. Genitourinary: No dysuria, trouble voiding, or hematuria. Musculoskeletal:  No gait disturbance, No weakness or joint complaints. Integumentary: No rash or pruritis. Neurological: No headache or diplopia. No tingling  Psychiatric: No anxiety, or depression. Endocrine: No temperature intolerance. Hematologic/Lymphatic: No abnormal bruising or bleeding, blood clots or swollen lymph nodes. Allergic/Immunologic: No nasal congestion or hives. PHYSICAL EXAM:      BP (!) 120/56   Pulse 80   Temp 98 °F (36.7 °C) (Oral)   Resp 20   Ht 5' 3\" (1.6 m)   Wt 169 lb 12.1 oz (77 kg)   SpO2 93%   BMI 30.07 kg/m²    Constitutional and General Appearance: alert, cooperative, no distress and appears stated age  HEENT: PERRL, no cervical lymphadenopathy. No masses palpable. Normal oral mucosa  Respiratory:  Normal excursion and expansion without use of accessory muscles  Resp Auscultation: Good respiratory effort. No for increased work of breathing. On auscultation: clear to auscultation bilaterally  Cardiovascular:  Heart tones are crisp and normal. regular S1 and S2. Irregular, afib   Jugular venous pulsation Normal  The carotid upstroke is normal in amplitude and contour without delay or bruit   Abdomen:   soft  Bowel sounds present  Extremities:   No edema  Neurological:  Alert and oriented. DATA:    Diagnostics:    EKG: Afib   ECHO: ordered    7/22    Left Ventricle: Systolic function is normal with an ejection fraction   of 55-60%. The EF by Stiles's is 50%. No segmental wall motion   abnormalities. Right Ventricle: Systolic function is normal.     Mitral Valve: There is mild regurgitation. There is no evidence of   mitral valve stenosis. Tricuspid Valve: There is mild regurgitation. There is no evidence of   tricuspid valve stenosis. Pericardium: There is no pericardial effusion. Stress Test: not obtained. Cardiac Angiography: not obtained.     Labs:     CBC:   Recent Labs 02/13/23  1100   WBC 11.2*   HGB 12.1   HCT 36.6        BMP:   Recent Labs     02/13/23  1100 02/14/23  0527    138   K 3.1* 3.7   CO2 30 32*   BUN 14 15   CREATININE 1.13* 1.13*   LABGLOM 52* 52*   GLUCOSE 134* 99     BNP: No results for input(s): BNP in the last 72 hours. PT/INR: No results for input(s): PROTIME, INR in the last 72 hours. APTT:No results for input(s): APTT in the last 72 hours. CARDIAC ENZYMES:No results for input(s): CKTOTAL, CKMB, CKMBINDEX, TROPONINI in the last 72 hours. FASTING LIPID PANEL:No results found for: HDL, LDLDIRECT, LDLCALC, TRIG  LIVER PROFILE:  Recent Labs     02/13/23  1100   AST 12   ALT 7   LABALBU 4.3       IMPRESSION:    Patient Active Problem List   Diagnosis    S/P laparoscopic cholecystectomy    Innominate artery stenosis (HCC)    S/P carotid endarterectomy    Disorder of carotid artery (HCC)    Disorder of lung    Kidney disorder    Vascular disorder    PNA (pneumonia)    History of embolectomy    Syncope and collapse    Vision disorder    Chronic atrial fibrillation (HCC)    Hypothyroidism    Hyperlipidemia    Stage 3a chronic kidney disease (HCC)    Chronic bronchitis (HCC)    Elevated troponin       RECOMMENDATIONS:  Questionable syncope vs mechanical fall. Negative orthostatics. Will order compression stockings   Minimally elevated trops. Await ECHO results. No CP, no EKG changes  PAF. Continue Xarelto. Will add lopressor   If ECHO low risk, no further cardiac workup at this time. Discussed with patient and Nurse.   Derek Diez, [address] Cardiology Consult           [phone]

## 2023-02-14 NOTE — CARE COORDINATION
Case Management Assessment  Initial Evaluation    Date/Time of Evaluation: 2/14/2023 9:00 AM  Assessment Completed by: Marty Lowry RN    If patient is discharged prior to next notation, then this note serves as note for discharge by case management. Patient Name: Esteban Ramachandran                   YOB: 1950  Diagnosis: Syncope and collapse [R55]                   Date / Time: 2/13/2023 10:41 AM    Patient Admission Status: Inpatient   Readmission Risk (Low < 19, Mod (19-27), High > 27): Readmission Risk Score: 11.3    Current PCP: Steph Fitzgerald MD  PCP verified by CM? Yes    Chart Reviewed: Yes      History Provided by: Patient  Patient Orientation: Alert and Oriented    Patient Cognition:      Hospitalization in the last 30 days (Readmission):  No    If yes, Readmission Assessment in  Navigator will be completed. Advance Directives:      Code Status: Full Code   Patient's Primary Decision Maker is: Legal Next of Kin      Discharge Planning:    Patient lives with: Family Members Type of Home: Trailer/Mobile Home  Primary Care Giver: Self  Patient Support Systems include: Family Members   Current Financial resources:    Current community resources:    Current services prior to admission: C-pap, Meals On Wheels            Current DME:              Type of Home Care services:  None    ADLS  Prior functional level: Independent in ADLs/IADLs  Current functional level: Independent in ADLs/IADLs    PT AM-PAC:   /24  OT AM-PAC:   /24    Family can provide assistance at DC: Would you like Case Management to discuss the discharge plan with any other family members/significant others, and if so, who?     Plans to Return to Present Housing: Yes  Other Identified Issues/Barriers to RETURNING to current housing:   Potential Assistance needed at discharge: N/A            Potential DME:    Patient expects to discharge to: Trailer/mobile home  Plan for transportation at discharge: Family    Financial    Payor: MEDICARE / Plan: MEDICARE PART A AND B / Product Type: *No Product type* /     Does insurance require precert for SNF: No    Potential assistance Purchasing Medications:    Meds-to-Beds request:        [facility] - [address] [phone]  [facility] [place] [address] [address]  Phone: [phone] Fax: [facility] [place] [address]  [address]  [facility] [address]  Phone: [phone] Fax: [phone]      Notes:    Factors facilitating achievement of predicted outcomes:     Barriers to discharge: Additional Case Management Notes: d/c home with family, current with OP PT/OT, declined home care    The Plan for Transition of Care is related to the following treatment goals of Syncope and collapse [K92]    IF APPLICABLE: The Patient and/or patient representative Job Rodriguez and her family were provided with a choice of provider and agrees with the discharge plan. Freedom of choice list with basic dialogue that supports the patient's individualized plan of care/goals and shares the quality data associated with the providers was provided to: Patient   Patient Representative Name:       The Patient and/or Patient Representative Agree with the Discharge Plan?  Yes    Yasmine Santiago RN  Case Management Department  Ph: [phone] Fax: [phone]

## 2023-02-14 NOTE — PLAN OF CARE
NON INVASIVE VENTILATION  PROVIDE OPTIMAL VENTILATION/ACCEPTABLE SP02  IMPLEMENT NON INVASIVE VENTILATION PROTOCOL  ASSESSMENT SKIN INTEGRITY  PATIENT EDUCATION AS NEEDED  BIPAP AS NEEDED         BRONCHOSPASM/BRONCHOCONSTRICTION     [x]         IMPROVE AERATION/BREATH SOUNDS  [x]   ADMINISTER BRONCHODILATOR THERAPY AS APPROPRIATE  [x]   ASSESS BREATH SOUNDS  [x]   IMPLEMENT AEROSOL/MDI PROTOCOL  [x]   PATIENT EDUCATION AS NEEDED

## 2023-02-14 NOTE — PROGRESS NOTES
Attempted to straight cath pt per NP order. Initiated catheter insertion and pt started urinating. no output with catheterization post void. Will continue to monitor.

## 2023-02-14 NOTE — PLAN OF CARE
Problem: Discharge Planning  Goal: Discharge to home or other facility with appropriate resources  2/14/2023 0034 by Evangelista Brumfield RN  Outcome: Progressing   Patient actively participates in ADLs and decision making regarding plan of care. Problem: Pain  Goal: Verbalizes/displays adequate comfort level or baseline comfort level  2/14/2023 0034 by Evangelista Brumfield RN  Outcome: Progressing   No new signs/symptoms of pain noted, pain rating < 3 on scale 0-10, pain controlled with medication/repositioning. Problem: Safety - Adult  Goal: Free from fall injury  2/14/2023 0034 by Evangelista Brumfield RN  Outcome: Progressing   No falls/injuries this shift, bed in lowest position, brakes on, bed alarm on, call light in reach, side rails up x2.

## 2023-02-14 NOTE — PROGRESS NOTES
Physical Therapy  Facility/Department: [facility] ICU  Physical Therapy Initial Assessment    Name: Guerita Winkler  : 1950  MRN: [id number]  Date of Service: 2023    Discharge Recommendations:  Patient would benefit from continued therapy after discharge   PT Equipment Recommendations  Equipment Needed: No (has walker that the patient should use at all times)  Other: The patient has a newer 4WW at home; the 4WW walker with her is more worn and therefore it is recommended that she use her newer 4WW      Patient Diagnosis(es): The encounter diagnosis was Syncope and collapse. Past Medical History:  has a past medical history of A-fib ([address] Anxiety, Arthritis, Blood transfusion reaction, CAD (coronary artery disease), Carotid stenosis, COPD (chronic obstructive pulmonary disease) ([address] GERD (gastroesophageal reflux disease), Hepatitis B, History of blood transfusion, Hyperlipidemia, Hypertension, Hypothyroid, Kidney disease, Legally blind, Macular edema, PVD (peripheral vascular disease) ([address] Sleep apnea, and Subclavian artery stenosis ([address] Past Surgical History:  has a past surgical history that includes Aorto-femoral Bypass Graft (); tumor removal (Left); Tonsillectomy; Toe amputation (); Cataract removal (Bilateral); Cholecystectomy (2014); Carotid endarterectomy; Cardiac catheterization (10/2012); eye surgery; eye surgery; Carotid endarterectomy (Left, 2019); Carotid endarterectomy (Left, 2019); and vascular surgery (N/A, 2022). Assessment   Body Structures, Functions, Activity Limitations Requiring Skilled Therapeutic Intervention: Decreased functional mobility ; Decreased safe awareness  Assessment: The patient lives with her 31 yo grandson who is able to help her at home. The patient ambulate 80ft with 4 wheeled walker with CGA and safety cueing, CGA for transfers.  The patient demonstrated decreased overall safety awareness, however with cueing this improved. PT recommended after discharge. Therapy Prognosis: Fair  Decision Making: Medium Complexity  Requires PT Follow-Up: Yes  Activity Tolerance  Activity Tolerance: Patient tolerated treatment well     Plan   Physcial Therapy Plan  General Plan:  (5-6x/week)  Current Treatment Recommendations: Strengthening, Balance training, Gait training, Functional mobility training, Stair training, Therapeutic activities, Transfer training  Safety Devices  Type of Devices: Call light within reach, Chair alarm in place, Left in chair, Nurse notified  Restraints  Restraints Initially in Place: No     Restrictions  Restrictions/Precautions  Restrictions/Precautions: Fall Risk  Required Braces or Orthoses?: No  Position Activity Restriction  Other position/activity restrictions: Up with assistance     Subjective   Pain: 3/10 in back from fall  General  Patient assessed for rehabilitation services?: Yes  Family / Caregiver Present: No  Diagnosis: fall during sleep study  Subjective  Subjective: The patient reports back pain from recent fall and peripheral neuropathy that she has had for a while in LEs.          Social/Functional History  Social/Functional History  Lives With: Family (31 yo grandson who does not work and is able to help her)  Type of Home: Mobile home  Home Layout: One level  Home Access: Ramped entrance (also has stairs)  Bathroom Shower/Tub: Walk-in shower (also has a jacuzzi tub that she is not able to use, as it is raised and she can't step up)  [employer]: Standard (has platform around toilet that she step onto)  [employer]: Shower chair, Grab bars around toilet  Home Equipment: Steve [employer], 4 wheeled, Walker, rolling, Wheelchair-electric (uses 4 wheeled walker when outside of the house only)  Has the patient had two or more falls in the past year or any fall with injury in the past year?: Yes (reports a fall in April where she broke her humerus)  Receives Help From: Family  ADL Assistance: Needs assistance  Bath: Modified independent  Dressing: Modified independent  Grooming: Independent  Feeding: Independent  Toileting: Independent  Homemaking Assistance: Needs assistance (the patient states that she is responsible for all homemaking, however this is very challenging and many things are not getting done)  Meal Prep:  (14 mobile meals delivered per week)  Homemaking Responsibilities: Yes (grandson helps, but the patient also is responsible for these)  Meal Prep Responsibility: Secondary  Laundry Responsibility: Secondary  Cleaning Responsibility: Secondary  Bill Paying/Finance Responsibility: Primary  Shopping Responsibility: Primary  Health Care Management: Primary  Ambulation Assistance: Independent (4 wheeled walker when outside of the house; no device in the home - reports that she holds onto walls and furniture)  Transfer Assistance: Independent  Active : No  Patient's  Info: family, transport service  Additional Comments: the patient has a hospital bed and regular bed at home  [address]: Impaired  Vision Exceptions: Wears glasses at all times  Hearing  Hearing:  (does not have hearing aids with her)  Hearing Exceptions: Bilateral hearing aid    Cognition   Orientation  Overall Orientation Status: Within Functional Limits  Orientation Level: Oriented X4  Cognition  Overall Cognitive Status: Exceptions  Arousal/Alertness: Delayed responses to stimuli  Following Commands: Follows multistep commands with repitition; Follows multistep commands with increased time  Attention Span: Attends with cues to redirect; Difficulty attending to directions  Safety Judgement: Decreased awareness of need for assistance  Problem Solving: Assistance required to generate solutions;Assistance required to implement solutions;Assistance required to identify errors made  Insights: Decreased awareness of deficits  Initiation: Requires cues for some  Sequencing: Requires cues for some     Objective AROM RLE (degrees)  RLE AROM: WFL  AROM LLE (degrees)  LLE AROM : WFL  Strength RLE  R Hip Flexion: 4/5  R Hip ABduction: 4/5  R Hip ADduction: 4/5  R Knee Flexion: 4/5  R Knee Extension: 4/5  R Ankle Dorsiflexion: 4/5  R Ankle Plantar flexion: 4/5  Strength LLE  Strength LLE: Exception  L Hip Flexion: 4/5  L Hip ABduction: 4/5  L Hip ADduction: 4/5  L Knee Flexion: 4/5  L Knee Extension: 4/5  L Ankle Dorsiflexion: 4/5  L Ankle Plantar Flexion: 4/5  Strength Other  Other: see OT evaluation for UE strength and ROM testing     Bed Mobility Training  Bed Mobility Training: Yes  Balance  Sitting:  (SBA- static, min A- dynamic)  Standing:  (CGA- static, min A- dynamic)  Transfer Training  Transfer Training: Yes  Overall Level of Assistance: Contact-guard assistance  Interventions: Safety awareness training;Verbal cues; Tactile cues  Sit to Stand: Contact-guard assistance; Additional time  Stand to Sit: Contact-guard assistance; Additional time  Gait Training  Gait Training: Yes  Gait  Overall Level of Assistance: Contact-guard assistance; Additional time  Interventions: Safety awareness training;Verbal cues  Speed/Maya: Pace decreased (< 100 feet/min)  Step Length: Left shortened;Right shortened  Swing Pattern: Left symmetrical;Right symmetrical  Stance: Left increased;Right increased  Distance (ft): 80 Feet  Assistive Device: Walker, rollator  Bed mobility  Supine to Sit: Minimal assistance (HOB raised and use of railings - has hospital bed at home)  Scooting: Contact guard assistance  Bed Mobility Comments: Min VCs for initiation/problem solving/attention to task; increased time/effort to perform. Transfers  Sit to Stand: Contact guard assistance  Stand to Sit: Contact guard assistance  Ambulation  Surface: Level tile  Device: Rollator  Assistance: Contact guard assistance  Quality of Gait: The patinet demonstrated decreased safety awareness with turns and transfers - education provided;  Slow pace with short step length that decreased the further the patient ambulated  Gait Deviations: Slow Maya;Decreased step length;Decreased step height  Distance: 80ft     Balance  Sitting - Static: Good  Sitting - Dynamic: Fair  Standing - Static: Fair    AM-PAC Score  AM-PAC Inpatient Mobility Raw Score : 17 (02/14/23 1318)  AM-PAC Inpatient T-Scale Score : 42.13 (02/14/23 1318)  Mobility Inpatient CMS 0-100% Score: 50.57 (02/14/23 1318)  Mobility Inpatient CMS G-Code Modifier : CK (02/14/23 1318)    Goals  Short Term Goals  Time Frame for Short Term Goals: 14 visits  Short Term Goal 1: The patient will be able to perform bed mobility with SBA. Short Term Goal 2: The patient will be able to perform transfers with good safety awareness and SBA. Short Term Goal 3: THe patient will ambulate 150ft with 4WW and SBA with good safety awareness. Patient Goals   Patient Goals : To return home     Education  Patient Education  Education Given To: Patient  Education Provided: Role of Therapy;Transfer Training; Fall Prevention Strategies  Education Provided Comments: to get close to chair prior to attempting to sit; increasing step length during gait  Education Method: Verbal;Demonstration  Barriers to Learning: None  Education Outcome: Verbalized understanding;Demonstrated understanding;Continued education needed    Therapy Time   Individual Concurrent Group Co-treatment   Time In   0957       Time Out   1034       Minutes   37       Timed Code Treatment Minutes: 2900 Brittanie Jenkins PT

## 2023-02-15 ENCOUNTER — APPOINTMENT (OUTPATIENT)
Dept: GENERAL RADIOLOGY | Age: 73
DRG: 312 | End: 2023-02-15
Payer: MEDICARE

## 2023-02-15 ENCOUNTER — APPOINTMENT (OUTPATIENT)
Dept: NON INVASIVE DIAGNOSTICS | Age: 73
DRG: 312 | End: 2023-02-15
Payer: MEDICARE

## 2023-02-15 LAB
ANION GAP SERPL CALCULATED.3IONS-SCNC: 13 MMOL/L (ref 9–17)
BUN SERPL-MCNC: 19 MG/DL (ref 8–23)
CALCIUM SERPL-MCNC: 10.1 MG/DL (ref 8.6–10.4)
CHLORIDE SERPL-SCNC: 97 MMOL/L (ref 98–107)
CO2 SERPL-SCNC: 27 MMOL/L (ref 20–31)
CREAT SERPL-MCNC: 1.19 MG/DL (ref 0.5–0.9)
GFR SERPL CREATININE-BSD FRML MDRD: 49 ML/MIN/1.73M2
GLUCOSE SERPL-MCNC: 107 MG/DL (ref 70–99)
LV EF: 56 %
LVEF MODALITY: NORMAL
MAGNESIUM SERPL-MCNC: 1.8 MG/DL (ref 1.6–2.6)
POTASSIUM SERPL-SCNC: 3.7 MMOL/L (ref 3.7–5.3)
SODIUM SERPL-SCNC: 137 MMOL/L (ref 135–144)

## 2023-02-15 PROCEDURE — 71045 X-RAY EXAM CHEST 1 VIEW: CPT

## 2023-02-15 PROCEDURE — 93306 TTE W/DOPPLER COMPLETE: CPT

## 2023-02-15 PROCEDURE — 6370000000 HC RX 637 (ALT 250 FOR IP): Performed by: NURSE PRACTITIONER

## 2023-02-15 PROCEDURE — 2700000000 HC OXYGEN THERAPY PER DAY

## 2023-02-15 PROCEDURE — 80048 BASIC METABOLIC PNL TOTAL CA: CPT

## 2023-02-15 PROCEDURE — 94640 AIRWAY INHALATION TREATMENT: CPT

## 2023-02-15 PROCEDURE — 6360000002 HC RX W HCPCS: Performed by: HOSPITALIST

## 2023-02-15 PROCEDURE — 94660 CPAP INITIATION&MGMT: CPT

## 2023-02-15 PROCEDURE — 6370000000 HC RX 637 (ALT 250 FOR IP): Performed by: STUDENT IN AN ORGANIZED HEALTH CARE EDUCATION/TRAINING PROGRAM

## 2023-02-15 PROCEDURE — 97110 THERAPEUTIC EXERCISES: CPT

## 2023-02-15 PROCEDURE — 97535 SELF CARE MNGMENT TRAINING: CPT

## 2023-02-15 PROCEDURE — 2580000003 HC RX 258: Performed by: STUDENT IN AN ORGANIZED HEALTH CARE EDUCATION/TRAINING PROGRAM

## 2023-02-15 PROCEDURE — 97530 THERAPEUTIC ACTIVITIES: CPT

## 2023-02-15 PROCEDURE — 2060000000 HC ICU INTERMEDIATE R&B

## 2023-02-15 PROCEDURE — 99232 SBSQ HOSP IP/OBS MODERATE 35: CPT | Performed by: HOSPITALIST

## 2023-02-15 PROCEDURE — 36415 COLL VENOUS BLD VENIPUNCTURE: CPT

## 2023-02-15 PROCEDURE — 83735 ASSAY OF MAGNESIUM: CPT

## 2023-02-15 RX ORDER — FUROSEMIDE 10 MG/ML
40 INJECTION INTRAMUSCULAR; INTRAVENOUS ONCE
Status: COMPLETED | OUTPATIENT
Start: 2023-02-15 | End: 2023-02-15

## 2023-02-15 RX ORDER — OXYCODONE HYDROCHLORIDE AND ACETAMINOPHEN 5; 325 MG/1; MG/1
1 TABLET ORAL EVERY 4 HOURS PRN
Status: DISCONTINUED | OUTPATIENT
Start: 2023-02-15 | End: 2023-02-16 | Stop reason: HOSPADM

## 2023-02-15 RX ADMIN — SODIUM CHLORIDE, PRESERVATIVE FREE 10 ML: 5 INJECTION INTRAVENOUS at 18:34

## 2023-02-15 RX ADMIN — FERROUS SULFATE TAB EC 325 MG (65 MG FE EQUIVALENT) 325 MG: 325 (65 FE) TABLET DELAYED RESPONSE at 08:29

## 2023-02-15 RX ADMIN — LEVOTHYROXINE SODIUM 50 MCG: 50 TABLET ORAL at 06:37

## 2023-02-15 RX ADMIN — CETIRIZINE HYDROCHLORIDE 10 MG: 10 TABLET, FILM COATED ORAL at 08:30

## 2023-02-15 RX ADMIN — BUDESONIDE AND FORMOTEROL FUMARATE DIHYDRATE 1 PUFF: 160; 4.5 AEROSOL RESPIRATORY (INHALATION) at 08:37

## 2023-02-15 RX ADMIN — ROFLUMILAST 500 MCG: 500 TABLET ORAL at 08:29

## 2023-02-15 RX ADMIN — PREGABALIN 150 MG: 75 CAPSULE ORAL at 21:22

## 2023-02-15 RX ADMIN — SODIUM CHLORIDE, PRESERVATIVE FREE 10 ML: 5 INJECTION INTRAVENOUS at 08:30

## 2023-02-15 RX ADMIN — FUROSEMIDE 20 MG: 20 TABLET ORAL at 08:30

## 2023-02-15 RX ADMIN — PANTOPRAZOLE SODIUM 40 MG: 40 TABLET, DELAYED RELEASE ORAL at 06:37

## 2023-02-15 RX ADMIN — ASPIRIN 81 MG: 81 TABLET, COATED ORAL at 08:29

## 2023-02-15 RX ADMIN — ALBUTEROL SULFATE 2 PUFF: 90 AEROSOL, METERED RESPIRATORY (INHALATION) at 19:53

## 2023-02-15 RX ADMIN — OXYCODONE HYDROCHLORIDE AND ACETAMINOPHEN 1 TABLET: 5; 325 TABLET ORAL at 21:22

## 2023-02-15 RX ADMIN — RIVAROXABAN 20 MG: 10 TABLET, FILM COATED ORAL at 08:29

## 2023-02-15 RX ADMIN — ALBUTEROL SULFATE 2 PUFF: 90 AEROSOL, METERED RESPIRATORY (INHALATION) at 08:37

## 2023-02-15 RX ADMIN — ALPRAZOLAM 1 MG: 0.5 TABLET ORAL at 21:22

## 2023-02-15 RX ADMIN — METOPROLOL TARTRATE 25 MG: 25 TABLET, FILM COATED ORAL at 08:53

## 2023-02-15 RX ADMIN — ROSUVASTATIN CALCIUM 10 MG: 5 TABLET, COATED ORAL at 08:30

## 2023-02-15 RX ADMIN — BUDESONIDE AND FORMOTEROL FUMARATE DIHYDRATE 1 PUFF: 160; 4.5 AEROSOL RESPIRATORY (INHALATION) at 19:53

## 2023-02-15 RX ADMIN — SODIUM CHLORIDE, PRESERVATIVE FREE 10 ML: 5 INJECTION INTRAVENOUS at 22:00

## 2023-02-15 RX ADMIN — PREGABALIN 150 MG: 75 CAPSULE ORAL at 08:30

## 2023-02-15 RX ADMIN — FUROSEMIDE 40 MG: 10 INJECTION, SOLUTION INTRAMUSCULAR; INTRAVENOUS at 18:34

## 2023-02-15 ASSESSMENT — PAIN DESCRIPTION - DESCRIPTORS
DESCRIPTORS: SHARP

## 2023-02-15 ASSESSMENT — PAIN DESCRIPTION - PAIN TYPE
TYPE: ACUTE PAIN

## 2023-02-15 ASSESSMENT — PAIN DESCRIPTION - LOCATION
LOCATION: RIB CAGE

## 2023-02-15 ASSESSMENT — PAIN SCALES - GENERAL
PAINLEVEL_OUTOF10: 5
PAINLEVEL_OUTOF10: 0
PAINLEVEL_OUTOF10: 0
PAINLEVEL_OUTOF10: 2
PAINLEVEL_OUTOF10: 0
PAINLEVEL_OUTOF10: 0

## 2023-02-15 ASSESSMENT — PAIN DESCRIPTION - ORIENTATION
ORIENTATION: RIGHT

## 2023-02-15 ASSESSMENT — PAIN DESCRIPTION - FREQUENCY
FREQUENCY: INTERMITTENT

## 2023-02-15 ASSESSMENT — PAIN SCALES - WONG BAKER
WONGBAKER_NUMERICALRESPONSE: 2

## 2023-02-15 ASSESSMENT — PAIN - FUNCTIONAL ASSESSMENT
PAIN_FUNCTIONAL_ASSESSMENT: ACTIVITIES ARE NOT PREVENTED

## 2023-02-15 NOTE — PROGRESS NOTES
Occupational Therapy  Facility/Department: [facility] [address] [place] [address]  Occupational Therapy Daily Treatment Note    Name: Ran Scott  : 1950  MRN: [id number]  Date of Service: 2/15/2023    Discharge Recommendations:  Patient would benefit from continued therapy after discharge    Patient Diagnosis(es): The encounter diagnosis was Syncope and collapse. Past Medical History:  has a past medical history of A-fib ([facility] [address] Anxiety, Arthritis, Blood transfusion reaction, CAD (coronary artery disease), Carotid stenosis, COPD (chronic obstructive pulmonary disease) ([address] GERD (gastroesophageal reflux disease), Hepatitis B, History of blood transfusion, Hyperlipidemia, Hypertension, Hypothyroid, Kidney disease, Legally blind, Macular edema, PVD (peripheral vascular disease) ([facility] [address] Sleep apnea, and Subclavian artery stenosis ([facility] [address] Past Surgical History:  has a past surgical history that includes Aorto-femoral Bypass Graft (); tumor removal (Left); Tonsillectomy; Toe amputation (); Cataract removal (Bilateral); Cholecystectomy (2014); Carotid endarterectomy; Cardiac catheterization (10/2012); eye surgery; eye surgery; Carotid endarterectomy (Left, 2019); Carotid endarterectomy (Left, 2019); and vascular surgery (N/A, 2022). Assessment   Performance deficits / Impairments: Decreased functional mobility ; Decreased ADL status; Decreased safe awareness;Decreased cognition;Decreased balance;Decreased high-level IADLs;Decreased endurance;Decreased strength  Assessment: Pt currently presents with above noted deficits limiting pt's ability to safely and independently engage in ADL tasks including functional transfers/functional mobility required for ADL performance. Pt requires mod VCs throughout session due to decreased cognition/safety awareness and CGA for engagement in functional transfers/functional mobility and toileting/grooming tasks.  Continued acute care OT services to maximize pt's safety and independence in performing functional tasks. Pt to require 24hr supervision/assistance and continued skilled therapy intervention at discharge. Prognosis: Good  Decision Making: Medium Complexity  REQUIRES OT FOLLOW-UP: Yes  Activity Tolerance  Activity Tolerance: Patient limited by fatigue  Safety Devices  Type of Devices: Call light within reach; Chair alarm in place;Gait belt;Left in chair;Nurse notified  Restraints  Restraints Initially in Place: No    Plan   Occupational Therapy Plan  Times Per Week: 5-6x/wk  Current Treatment Recommendations: Balance training, Functional mobility training, Endurance training, Equipment evaluation, education, & procurement, Patient/Caregiver education & training, Self-Care / ADL, Home management training, Strengthening, Safety education & training     Restrictions  Restrictions/Precautions  Restrictions/Precautions: Fall Risk  Required Braces or Orthoses?: No  Position Activity Restriction  Other position/activity restrictions: Up with assistance    Subjective   General  Patient assessed for rehabilitation services?: Yes  Family / Caregiver Present: No  General Comment  Comments: RN ok'd for therapy session this AM. Pt agreeable to participate in session and was pleasant/cooperative throughout session. Pt reports no pain, however, reports moderate numbness in left foot and minimal numbness of right foot.  No pain was reported by pt after session    Objective  Balance  Sitting:  (SBA- static CGA- dynamic; pt seated EOB unsupported ~9 minutes)  Standing:  (CGA- static; pt completed household distance functional mobility with RW from bed to toilet; completed brief management, hand hygiene, and grooming activity standing at sink; completed household distance functional mobility from bathroom to chair in hospital room for a total standing time of ~8 minutes)    Functional Mobility  Overall Level of Assistance: Pt performed functional mobility to/from bathroom (CGA throughout functional mobility and mod VCs for safety awareness and use of RW)    Toilet Transfers  Toilet - Technique: Ambulating  Equipment Used: Grab bars  Toilet Transfer: Contact guard assistance  Toilet Transfers Comments: Mod VCs for proper hand placement/sequencing/safety awareness with use of rollator; increased time/effort to perform    ADL  Grooming: Contact guard assistance; Increased time to complete  Grooming Skilled Clinical Factors: Pt completed hand hygiene and grooming activity standing at sink; pt rested on sink with distal BUE throughout task reporting this is normal for pt  Toileting: Minimal assistance; Increased time to complete  Toileting Skilled Clinical Factors: Pt able to perform bilateral brief management standing without the use of grab bars with CGA    Bed mobility  Supine to Sit: Minimal assistance (Pt required CGA for trunk flexion when trying to sit up at EOB)  Scooting: Contact guard assistance  Bed Mobility Comments: Pt required Min verbal cues for sequencing and problem solving throughout bed mobility task; HOB raised to ~60*; use of bed rails to assist with rolling, sitting up, and static balance    Transfers  Sit to stand: Contact guard assistance  Stand to sit: Contact guard assistance  Transfer Comments: Mod VCs for proper hand placement/sequencing/safety awareness with use of rollator; increased time/effort to perform       Cognition  Overall Cognitive Status: Exceptions  Arousal/Alertness: Delayed responses to stimuli  Following Commands: Follows multistep commands with repitition; Follows multistep commands with increased time  Attention Span: Attends with cues to redirect; Difficulty attending to directions  Safety Judgement: Decreased awareness of need for assistance  Problem Solving: Assistance required to generate solutions;Assistance required to implement solutions;Assistance required to identify errors made;Assistance required to correct errors made;Decreased awareness of errors  Insights: Decreased awareness of deficits  Initiation: Requires cues for some  Sequencing: Requires cues for some  Orientation  Overall Orientation Status: Within Functional Limits  Orientation Level: Oriented X4    Upper extremity exercises:   Bicep curl bilaterally x10  Shoulder flexion/extension bilaterally x10  Punches bilaterally x10  Tricep curl bilaterally x10  Comment: Pt performed BUE HEP while seated supported in recliner chair with increased time/effort and rest breaks required throughout. Education Given To: Patient  Education Provided: Role of Therapy;Plan of Care (Activity promotion; Safety with transfers and use of RW; Safety awareness; UE ROM and strengthening activities)  Education Provided Comments: ROM exercises were provided ans demonstrated to the client; client was able to complete 10 sets of 4 BUE exercises and was encouraged to complete exercises on own during hospital stay and upon discharge; pt was given handout depicting and describing UE exercises as a HEP  Education Method: Demonstration;Verbal  Barriers to Learning: Cognition; Hearing  Education Outcome: Verbalized understanding;Continued education needed;Demonstrated understanding    AM-PAC Score  AM-[facility] Inpatient Daily Activity Raw Score: 19 (02/15/23 1318)  AM-PAC Inpatient ADL T-Scale Score : 40.22 (02/15/23 1318)  ADL Inpatient CMS 0-100% Score: 42.8 (02/15/23 1318)  ADL Inpatient CMS G-Code Modifier : CK (02/15/23 1318)    Goals  Short Term Goals  Time Frame for Short Term Goals: 14 visits  Short Term Goal 1: Pt will perform ADL tasks with mod IND using AE/DME PRN  Short Term Goal 2: Pt will perform functional transfers/functional mobility with mod IND using least restrictive AD  Short Term Goal 3: Pt will independently demo good safety awareness during engagement in all ADLs and functional transfers/functional mobility  Short Term Goal 4: Pt will demo 10+ minutes BUE HEP for improved BUE strength/activity tolerance during engagement in functional tasks       Therapy Time   Individual Concurrent Group Co-treatment   Time In 1118         Time Out 1158         Minutes 40         Time Coded Treatment minutes: 30    Baron Ramirez S/OT

## 2023-02-15 NOTE — PROGRESS NOTES
Physician Progress Note      PATIENT:               Triston Mojica  CSN #:                  [id number]  :                       1950  ADMIT DATE:       2023 10:41 AM  DISCH DATE:  RESPONDING  PROVIDER #: Melissa Ball DO          QUERY TEXT:    Pt admitted with Fall/Syncope. Noted documentation of possible Type 2 MI per   Cardiology consultant. If possible, please document in progress notes and   discharge summary:    The medical record reflects the following:  Risk Factors: HTN,CAD,PAF  Clinical Indicators: Admitted s/p mechanical fall vs syncope d/t hypotension. Per Cardiology progress note 2/15 ' Minimally elevated trops could be type 2   secondary to syncope.'  Trops 68 - 54 - 53. Bnp 440. Denies chest pain or   dizziness. Echo pending. Treatment: Cardiology workup. Compression socks. Home med changes    Thank you, Tomeka JAY  email - [email or web address]  office hours M--7A-3P  Options provided:  -- Type 2 MI confirmed present on admission  -- Type 2 MI ruled out  -- Other - I will add my own diagnosis  -- Disagree - Not applicable / Not valid  -- Disagree - Clinically unable to determine / Unknown  -- Refer to Clinical Documentation Reviewer    PROVIDER RESPONSE TEXT:    Provider is clinically unable to determine a response to this query. Echo is not back, I do not know.     Query created by: Sesar Vences on 2/15/2023 2:05 PM      Electronically signed by:  Melia Leonard DO 2/15/2023 2:11 PM

## 2023-02-15 NOTE — PLAN OF CARE
Problem: Discharge Planning  Goal: Discharge to home or other facility with appropriate resources  2/15/2023 0048 by Ted Montalvo RN  Outcome: Progressing   Patient actively participates in ADLs and decision making regarding plan of care     Problem: Pain  Goal: Verbalizes/displays adequate comfort level or baseline comfort level  2/15/2023 0048 by Ted Montalvo RN  Outcome: Progressing   No new signs/symptoms of pain noted, pain rating < 3 on scale 0-10, pain controlled with medication/ repositioning     Problem: Safety - Adult  Goal: Free from fall injury  2/15/2023 0048 by Ted Montalvo RN  Outcome: Progressing   No falls/injuries this shift, bed in lowest position, breaks on, bed alarm on, call light within reach, side rails up X2

## 2023-02-15 NOTE — PROGRESS NOTES
[facility]  Progress Note                   Date:   2/15/2023  Patient name: Esteban Ramachandran  Date of admission:  2/13/2023 10:41 AM  MRN:   [id number]  YOB: 1950  PCP: Steph Fitzgerald MD    Reason for Admission: Syncope and collapse [R55]    Subjective:       Clinical Changes /Abnormalities: Patient seen and examined. Denies chest pain or shortness of breath. Tele/vitals/labs reviewed . Discussed case with RN. Called ECHO lab and ECHO wasn't done     Review of Systems    Medications:   Scheduled Meds:   metoprolol tartrate  25 mg Oral BID    aspirin  81 mg Oral Daily    ferrous sulfate  325 mg Oral Daily    cetirizine  10 mg Oral Daily    furosemide  20 mg Oral Daily    levothyroxine  50 mcg Oral Daily    pantoprazole  40 mg Oral QAM AC    pregabalin  150 mg Oral BID    Roflumilast  500 mcg Oral Daily    rosuvastatin  10 mg Oral Daily    budesonide-formoterol  1 puff Inhalation BID    rivaroxaban  20 mg Oral Daily with breakfast    sodium chloride flush  5-40 mL IntraVENous 2 times per day    albuterol sulfate HFA  2 puff Inhalation BID     Continuous Infusions:   sodium chloride 75 mL/hr at 02/13/23 1501    sodium chloride       CBC:   Recent Labs     02/13/23  1100   WBC 11.2*   HGB 12.1        BMP:    Recent Labs     02/13/23  1100 02/14/23  0527 02/15/23  0533    138 137   K 3.1* 3.7 3.7   CL 96* 97* 97*   CO2 30 32* 27   BUN 14 15 19   CREATININE 1.13* 1.13* 1.19*   GLUCOSE 134* 99 107*     Hepatic:  Recent Labs     02/13/23  1100   AST 12   ALT 7   BILITOT 0.8   ALKPHOS 104     Troponin:   Recent Labs     02/13/23  1255 02/13/23  1522 02/13/23  1839   TROPHS 62* 54* 53*     BNP: No results for input(s): BNP in the last 72 hours. Lipids: No results for input(s): CHOL, HDL in the last 72 hours. Invalid input(s): LDLCALCU  INR: No results for input(s): INR in the last 72 hours.     DATA:    Diagnostics:    EKG: Afib   ECHO: ordered     7/22    Left Ventricle: Systolic function is normal with an ejection fraction   of 55-60%. The EF by Stiles's is 50%. No segmental wall motion   abnormalities. Right Ventricle: Systolic function is normal.     Mitral Valve: There is mild regurgitation. There is no evidence of   mitral valve stenosis. Tricuspid Valve: There is mild regurgitation. There is no evidence of   tricuspid valve stenosis. Pericardium: There is no pericardial effusion. Stress Test: not obtained. Cardiac Angiography: not obtained. Objective:   Vitals: BP (!) 113/51 Comment: noted  Pulse 76   Temp 98.4 °F (36.9 °C) (Oral)   Resp 16   Ht 5' 3\" (1.6 m)   Wt 169 lb 12.1 oz (77 kg)   SpO2 91%   BMI 30.07 kg/m²   General appearance: alert and cooperative with exam  HEENT: Head: Normocephalic, no lesions, without obvious abnormality. Neck:no JVD, trachea midline, no adenopathy  Lungs: Clear to auscultation  Heart: irregular rate and rhythm, s1/s2 auscultated, no murmurs  Abdomen: soft, non-tender, bowel sounds active  Extremities: no edema  Neurologic: not done        Assessment / Acute Cardiac Problems:   Questionable syncope vs mechanical fall  Minimally elevated trops could be type 2 secondary to syncope   PAF      Patient Active Problem List:     S/P laparoscopic cholecystectomy     Innominate artery stenosis (HCC)     S/P carotid endarterectomy     Disorder of carotid artery (HCC)     Disorder of lung     Kidney disorder     Vascular disorder     PNA (pneumonia)     History of embolectomy     Syncope and collapse     Vision disorder     Chronic atrial fibrillation (HCC)     Hypothyroidism     Hyperlipidemia     Stage 3a chronic kidney disease (HCC)     Chronic bronchitis (HCC)     Elevated troponin      Plan of Treatment:   Stable - denies chest pain or dizziness , will order compression socks   Awaiting ECHO If ECHO low risk, no further cardiac workup at this time. 3. PAF. rate controled- Continue Xarelto and  lopressor  4.  Keep K>4, Mg>2     Electronically signed by RAYMUNDO Jarvis NP on 2/15/2023 at 9:57 [facility].  [phone]

## 2023-02-15 NOTE — PROGRESS NOTES
Physical Therapy  Facility/Department: [facility]  Daily Treatment Note  NAME: Kayli Arellano  : 1950  MRN: [id number]    Date of Service: 2/15/2023    Discharge Recommendations:  Patient would benefit from continued therapy after discharge   PT Equipment Recommendations  Equipment Needed: No  Other: The patient has a newer 4WW at home; the 4WW walker with her is more worn and therefore it is recommended that she use her newer 4WW    Patient Diagnosis(es): The encounter diagnosis was Syncope and collapse. Assessment   Assessment: The patient demonstrated decreased safety awareness, slight confusion at times, and increased level of assistance for mobility today. Concerns that the patient would not be safe with prior living arrangements based on mobility today. RN and  notified. Activity Tolerance: Patient limited by fatigue  Equipment Needed: No  Other: The patient has a newer 4WW at home; the 4WW walker with her is more worn and therefore it is recommended that she use her newer 4WW     Plan    Physcial Therapy Plan  General Plan:  (5-6x/week)  Current Treatment Recommendations: Strengthening;Balance training;Gait training;Functional mobility training;Stair training; Therapeutic activities;Transfer training     Restrictions  Restrictions/Precautions  Restrictions/Precautions: Fall Risk  Required Braces or Orthoses?: No  Position Activity Restriction  Other position/activity restrictions: Up with assistance     Subjective    Subjective  Subjective: The patient was agreeable to therapy. Pain: 3/10 in back  Orientation  Overall Orientation Status: Impaired  Orientation Level: Oriented to situation;Oriented to person;Oriented to place; Disoriented to time  Cognition  Overall Cognitive Status: Exceptions  Arousal/Alertness: Delayed responses to stimuli  Following Commands: Follows one step commands with increased time; Follows one step commands with repetition  Attention Span: Difficulty attending to directions; Difficulty dividing attention  Memory: Decreased recall of recent events  Safety Judgement: Decreased awareness of need for assistance;Decreased awareness of need for safety  Problem Solving: Decreased awareness of errors  Insights: Not aware of deficits  Initiation: Requires cues for all  Sequencing: Requires cues for all  Cognition Comment: The patient appears confused today, for example: The patient asked PT if her cat was inside of the portable pulse oximeter, she then asked if her cat was in the hospital; the patient was looking intensely through walker basket for her drink (none in there) when her drink was directly in front of her on the tray table; the patient does not recall talking to her grandson during this admission at one point (writer witnessed her talking with him on the phone yesterday) however then states that her grandson just left her hospital room a little while ago (unsure if she had any visitors today, however none were observed in room or entering/leaving room). RN and  notified of all of the above. Objective   SpO2: 94 %  O2 Device: Nasal cannula  Comment: 2L  Bed Mobility Training  Bed Mobility Training: Yes  Overall Level of Assistance: Moderate assistance  Interventions: Safety awareness training; Tactile cues; Verbal cues  Rolling: Moderate assistance  Sit to Supine: Moderate assistance  Scooting: Minimum assistance  Balance  Sitting:  (SBA- static CGA- dynamic)  Standing:  (CGA- static)  Transfer Training  Transfer Training: Yes  Overall Level of Assistance: Minimum assistance  Interventions: Safety awareness training;Verbal cues; Tactile cues  Sit to Stand: Minimum assistance  Stand to Sit: Minimum assistance  Gait Training  Gait Training: Yes  Gait  Overall Level of Assistance: Minimum assistance  Interventions: Safety awareness training;Verbal cues  Speed/Maya: Pace decreased (< 100 feet/min)  Step Length: Left shortened;Right shortened  Distance (ft): 25 Feet (unable to walk further today due to fatigue.)  Assistive Device: Walker, rollator     Safety Devices  Type of Devices: Bed alarm in place;Nurse notified; Left in bed;Gait belt;Patient at risk for falls;Call light within reach  Restraints  Restraints Initially in Place: No     Goals  Short Term Goals  Time Frame for Short Term Goals: 14 visits  Short Term Goal 1: The patient will be able to perform bed mobility with SBA. Short Term Goal 2: The patient will be able to perform transfers with good safety awareness and SBA. Short Term Goal 3: THe patient will ambulate 150ft with 4WW and SBA with good safety awareness. Patient Goals   Patient Goals : To return home    Education  Patient Education  Education Given To: Patient  Education Provided: Role of Therapy;Transfer Training; Fall Prevention Strategies  Education Provided Comments: to get close to chair prior to attempting to sit; increasing step length during gait; reorientation  Education Method: Verbal;Demonstration  Barriers to Learning: Cognition  Education Outcome: Verbalized understanding;Continued education needed    Therapy Time   Individual Concurrent Group Co-treatment   Time In 1255         Time Out 1321         Minutes 26         Timed Code Treatment Minutes: 455 Argenis Childs Minutes       Duncan Fisher, PT

## 2023-02-15 NOTE — PLAN OF CARE
Problem: Respiratory - Adult  Goal: Achieves optimal ventilation and oxygenation  Outcome: Progressing  Flowsheets (Taken 2/15/2023 0204)  Achieves optimal ventilation and oxygenation:   Assess for changes in respiratory status   Assess for changes in mentation and behavior   Position to facilitate oxygenation and minimize respiratory effort   Oxygen supplementation based on oxygen saturation or arterial blood gases   Encourage broncho-pulmonary hygiene including cough, deep breathe, incentive spirometry   Assess and instruct to report shortness of breath or any respiratory difficulty   Respiratory therapy support as indicated

## 2023-02-15 NOTE — PROGRESS NOTES
[facility]  Office: [address], DO, Josephine Macario, DO, Johnathan Gutierres, DO, Cony Thurman Blood, DO, Rishi Allen MD, Rochelle Quezada MD, Tennille Escobar MD, Porter Braswell MD,  Shelton Escobar MD, Celeste Frazier MD, Edwin Hamilton, DO, Jason Melo MD,  David Delgado MD, Virgen Roldan MD, Ankush Rea DO, Marilynn Hernandez MD, Felton Torres MD, Kimberlyn Ayala, DO, Marylin Vee MD, Collette Hahn, MD, Kiel Guzman MD, Janey Cottrell MD, Aileen Lance, DO, Jayodn Palumbo MD, Roberto Carlos Caputo MD, Josephine Canela, Carol Maier, CNP, Juan Carlos Zepeda, CNP, Naresh Ha, CNP,  Adis Quintero, DNP, Ag Crook, CNP, Yevgeniy Babb, CNP, Rachna Haney, CNP, Daljit Bryan, CNP, Michelle Modi, CNP, Blas Rossi PA-C, Yumi Street, CNS, Gilberto Dickson, CNP, [address]    Progress Note    2/15/2023    2:11 PM    Name:   Yeimy Short  MRN:     [id number]     Acct:      [de-identified]   Room:   [address] [place] [address] Day:  2  Admit Date:  2/13/2023 10:41 AM    PCP:   Bam Britt MD  Code Status:  Full Code    Subjective:     C/C:   Chief Complaint   Patient presents with    Fall     Patient seen in follow-up for syncope, patient states she feels fine    Interval History Status: improved. Patient is doing well overall. Echocardiogram has not yet been resulted. At this point in time given the abnormal troponin and her syncopal event we are awaiting echocardiogram for risk factor stratification. In the event that there is no wall motion abnormality no further cardiac work-up is required. I have discontinued the patient's Norvasc and decreased her Cozaar. As of today I have discontinued her Cozaar and we continue her on Lopressor alone.   Her blood pressure is stable on Lopressor alone and subsequently I do believe that the patient's syncopal event was secondary to polypharmacy and the combination of Norvasc, Cozaar, Lopressor. The patient denies any questions or concerns at this point in time. She has required oxygen and I am going to check a repeat chest x-ray. She may require home oxygen evaluation prior to discharge. The patient denies any questions or concerns, she has very poor insight into her disease process. She is somewhat argumentative about her blood pressure pills. It is clear that she does not know what she is taking. She states that she has a \"pill pack\". She will undoubtedly require visiting nurse services to assist in her pill pack as her Cozaar and Norvasc have to be removed. The patient has [place] no idea what medication she is taking. Brief History:     Mitchel Mcgregor is a 81 y.o. female with a past medical history of persistent atrial fibrillation (on Xarelto), COPD, hypothyroidism, hypertension, hyperlipidemia and tobacco abuse who presented to the emergency department on 2/13/2023 complaining of a fall and rib pain. The patient states that she was having a sleep study done last night and woke up in the middle of the night to use the restroom. She states that she fell on her way to the restroom but does not remember how she fell (patient states she was very drowsy at the time). The patient states that she realized she had fallen when people were standing around her. She denies headache or chest pain prior to the fall and did not hit her head during the fall. She decided to come to the emergency department this morning due to persistent rib pain. In the ED, the patient was afebrile and nontoxic appearing. Chest x-ray was unremarkable and CBC and BMP were at baseline for the patient. Troponin was found to be elevated at 68 with no baseline to compare to. The patient is admitted to internal medicine for further management of presumed syncope and collapse.      Review of Systems:     Constitutional:  negative for chills, fevers, sweats  Respiratory:  negative for cough, dyspnea on exertion, shortness of breath, wheezing  Cardiovascular:  negative for chest pain, chest pressure/discomfort, lower extremity edema, palpitations  Gastrointestinal:  negative for abdominal pain, constipation, diarrhea, nausea, vomiting  Neurological:  negative for dizziness, headache    Medications: Allergies: Allergies   Allergen Reactions    Iv Contrast [Iodides] Other (See Comments)     Due to kidney problems       Current Meds:   Scheduled Meds:    metoprolol tartrate  25 mg Oral BID    aspirin  81 mg Oral Daily    ferrous sulfate  325 mg Oral Daily    cetirizine  10 mg Oral Daily    furosemide  20 mg Oral Daily    levothyroxine  50 mcg Oral Daily    pantoprazole  40 mg Oral QAM AC    pregabalin  150 mg Oral BID    Roflumilast  500 mcg Oral Daily    rosuvastatin  10 mg Oral Daily    budesonide-formoterol  1 puff Inhalation BID    rivaroxaban  20 mg Oral Daily with breakfast    sodium chloride flush  5-40 mL IntraVENous 2 times per day    albuterol sulfate HFA  2 puff Inhalation BID     Continuous Infusions:    sodium chloride 75 mL/hr at 02/13/23 1501    sodium chloride       PRN Meds: albuterol sulfate HFA, ALPRAZolam, sodium chloride flush, sodium chloride, potassium chloride **OR** potassium alternative oral replacement **OR** potassium chloride, magnesium sulfate, ondansetron **OR** ondansetron, polyethylene glycol, acetaminophen **OR** acetaminophen, oxyCODONE-acetaminophen    Data:     Past Medical History:   has a past medical history of A-fib ([facility] Anxiety, Arthritis, Blood transfusion reaction, CAD (coronary artery disease), Carotid stenosis, COPD (chronic obstructive pulmonary disease) ([facility] GERD (gastroesophageal reflux disease), Hepatitis B, History of blood transfusion, Hyperlipidemia, Hypertension, Hypothyroid, Kidney disease, Legally blind, Macular edema, PVD (peripheral vascular disease) ([facility] Sleep apnea, and Subclavian artery stenosis ([facility]     Social History:   reports that she has been smoking. She has been smoking an average of 1 pack per day. She has quit using smokeless tobacco. She reports that she does not drink alcohol and does not use drugs. Family History:   Family History   Problem Relation Age of Onset    Heart Attack Father     Stroke Father     High Blood Pressure Father     Heart Disease Father     Cancer Mother         Multiple myeloma    Cancer Sister         Lung with mets to brain    Cancer Maternal Aunt         Several aunts had unknown cancer    Heart Attack Brother     Liver Disease Brother     Heart Defect Sister        Vitals:  /85   Pulse 82   Temp 99.1 °F (37.3 °C) (Oral)   Resp 16   Ht 5' 3\" (1.6 m)   Wt 169 lb 12.1 oz (77 kg)   SpO2 90%   BMI 30.07 kg/m²   Temp (24hrs), Av.2 °F (36.8 °C), Min:97.5 °F (36.4 °C), Max:99.1 °F (37.3 °C)    No results for input(s): POCGLU in the last 72 hours. I/O (24Hr):   No intake or output data in the 24 hours ending 02/15/23 1411    Labs:  Hematology:  Recent Labs     23  1100   WBC 11.2*   RBC 3.90*   HGB 12.1   HCT 36.6   MCV 93.8   MCH 31.0   MCHC 33.1   RDW 14.5      MPV 10.2     Chemistry:  Recent Labs     23  1100 23  1255 23  1522 23  1839 23  0527 02/15/23  0533 02/15/23  0534     --   --   --  138 137  --    K 3.1*  --   --   --  3.7 3.7  --    CL 96*  --   --   --  97* 97*  --    CO2 30  --   --   --  32* 27  --    GLUCOSE 134*  --   --   --  99 107*  --    BUN 14  --   --   --  15 19  --    CREATININE 1.13*  --   --   --  1.13* 1.19*  --    MG 1.8  --   --   --  1.9  --  1.8   ANIONGAP 13  --   --   --  9 13  --    LABGLOM 52*  --   --   --  52* 49*  --    CALCIUM 10.0  --   --   --  9.7 10.1  --    PROBNP  --   --  440*  --   --   --   --    TROPHS 68* 62* 54* 53*  --   --   --      Recent Labs     23  1100   PROT 7.5   LABALBU 4.3   AST 12   ALT 7   ALKPHOS 104   BILITOT 0.8     ABG:No results found for: POCPH, PHART, PH, POCPCO2, XWS2VOB, PCO2, POCPO2, PO2ART, PO2, POCHCO3, AUZ2DZR, HCO3, NBEA, PBEA, BEART, BE, THGBART, THB, EVE3OAJ, IRQH6QZG, O8OWSBMD, O2SAT, FIO2  No results found for: SPECIAL  Lab Results   Component Value Date/Time    CULTURE KLEBSIELLA PNEUMONIAE >910519 CFU/ML (A) 10/06/2022 09:03 AM       Radiology:  XR CHEST PA LATERAL W BOTH OBLIQUE PROJECTIONS    Result Date: 2/13/2023  1. No radiographic evidence for pneumothorax. A small pneumothorax could potentially be occult on this exam.  If this remains a clinical concern CT scan would be advised. 2. No radiographic evidence for acute left rib fracture.        Physical Examination:       General appearance:  alert, cooperative and no distress  Mental Status:  oriented to person, place and time and normal affect  Lungs:  clear to auscultation bilaterally, normal effort  Heart: Irregular rhythm, rate controlled  Abdomen:  soft, nontender, nondistended, normal bowel sounds, no masses, hepatomegaly, splenomegaly  Extremities:  no edema, redness, tenderness in the calves  Skin:  no gross lesions, rashes, induration    Assessment:     Hospital Problems             Last Modified POA    * (Principal) Syncope and collapse 2/13/2023 Yes    Chronic atrial fibrillation ([address] 2/13/2023 Yes    Hypothyroidism 2/13/2023 Yes    Hyperlipidemia 2/13/2023 Yes    Chronic bronchitis ([address] 2/13/2023 Yes    Elevated troponin 2/13/2023 Yes    Disorder of carotid artery ([address] 2/13/2023 Yes       Plan:     Syncope  Suspect secondary to Cozaar/Norvasc/Lopressor leading to a hypotensive event  Cozaar/Norvasc discontinued  Awaiting echocardiogram  Elevated troponin  Likely secondary to hypotension  Awaiting echocardiogram  Chronic atrial fibrillation  Rate controlled, continue Lopressor/Xarelto  COPD  Stable, no wheezing  Hypothyroidism  Continue Synthroid  Essential hypertension  Continue Lopressor for now for rate control  Cozaar/Norvasc discontinued  Blood pressure stable off these medications  Acute hypoxic respiratory failure  Check chest x-ray  Incentive spirometer at the bedside  Patient may need home oxygen evaluation  Hyperlipidemia  Continue statin    Await echocardiogram    Hussein Austin, DO  2/15/2023  2:11 PM

## 2023-02-15 NOTE — PLAN OF CARE
Patient encouraged to deep breathe and cough, call for assistance to maintain safety. Patient c/o pain with movement, otherwise is resting comfortably. Patient needing oxygen per nasal cannula at this time.

## 2023-02-15 NOTE — RT PROTOCOL NOTE
RT Nebulizer Bronchodilator Protocol Note    There is a bronchodilator order in the chart from a provider indicating to follow the RT Bronchodilator Protocol and there is an Initiate RT Bronchodilator Protocol order as well (see protocol at bottom of note). CXR Findings:  No results found. The findings from the last RT Protocol Assessment were as follows:  Smoking: Chronic pulmonary disease  Respiratory Pattern: Regular pattern and RR 12-20 bpm  Breath Sounds: Slightly diminished and/or crackles  Cough: Strong, productive  Indication for Bronchodilator Therapy: On home bronchodilators, Decreased or absent breath sounds  Bronchodilator Assessment Score: 5    Aerosolized bronchodilator medication orders have been revised according to the RT Nebulizer Bronchodilator Protocol below. Respiratory Therapist to perform RT Therapy Protocol Assessment initially then follow the protocol. Repeat RT Therapy Protocol Assessment PRN for score 0-3 or on second treatment, BID, and PRN for scores above 3. No Indications - adjust the frequency to every 6 hours PRN wheezing or bronchospasm, if no treatments needed after 48 hours then discontinue using Per Protocol order mode. If indication present, adjust the RT bronchodilator orders based on the Bronchodilator Assessment Score as indicated below. If a patient is on this medication at home then do not decrease Frequency below that used at home. 0-3 - enter or revise RT bronchodilator order(s) to equivalent RT Bronchodilator order with Frequency of every 4 hours PRN for wheezing or increased work of breathing using Per Protocol order mode. 4-6 - enter or revise RT Bronchodilator order(s) to two equivalent RT bronchodilator orders with one order with BID Frequency and one order with Frequency of every 4 hours PRN wheezing or increased work of breathing using Per Protocol order mode.          7-10 - enter or revise RT Bronchodilator order(s) to two equivalent RT bronchodilator orders with one order with TID Frequency and one order with Frequency of every 4 hours PRN wheezing or increased work of breathing using Per Protocol order mode. 11-13 - enter or revise RT Bronchodilator order(s) to one equivalent RT bronchodilator order with QID Frequency and an Albuterol order with Frequency of every 4 hours PRN wheezing or increased work of breathing using Per Protocol order mode. Greater than 13 - enter or revise RT Bronchodilator order(s) to one equivalent RT bronchodilator order with every 4 hours Frequency and an Albuterol order with Frequency of every 2 hours PRN wheezing or increased work of breathing using Per Protocol order mode. RT to enter RT Home Evaluation for COPD & MDI Assessment order using Per Protocol order mode.     Electronically signed by Esau Lizama RCP on 2/15/2023 at 2:05 AM

## 2023-02-16 VITALS
BODY MASS INDEX: 30.08 KG/M2 | HEIGHT: 63 IN | SYSTOLIC BLOOD PRESSURE: 107 MMHG | OXYGEN SATURATION: 90 % | TEMPERATURE: 98.4 F | RESPIRATION RATE: 16 BRPM | HEART RATE: 82 BPM | WEIGHT: 169.75 LBS | DIASTOLIC BLOOD PRESSURE: 48 MMHG

## 2023-02-16 LAB
ANION GAP SERPL CALCULATED.3IONS-SCNC: 10 MMOL/L (ref 9–17)
BUN SERPL-MCNC: 22 MG/DL (ref 8–23)
CALCIUM SERPL-MCNC: 9.1 MG/DL (ref 8.6–10.4)
CHLORIDE SERPL-SCNC: 96 MMOL/L (ref 98–107)
CO2 SERPL-SCNC: 29 MMOL/L (ref 20–31)
CREAT SERPL-MCNC: 1 MG/DL (ref 0.5–0.9)
GFR SERPL CREATININE-BSD FRML MDRD: >60 ML/MIN/1.73M2
GLUCOSE SERPL-MCNC: 103 MG/DL (ref 70–99)
MAGNESIUM SERPL-MCNC: 2 MG/DL (ref 1.6–2.6)
POTASSIUM SERPL-SCNC: 3.4 MMOL/L (ref 3.7–5.3)
SODIUM SERPL-SCNC: 135 MMOL/L (ref 135–144)

## 2023-02-16 PROCEDURE — 99232 SBSQ HOSP IP/OBS MODERATE 35: CPT | Performed by: HOSPITALIST

## 2023-02-16 PROCEDURE — 6370000000 HC RX 637 (ALT 250 FOR IP): Performed by: STUDENT IN AN ORGANIZED HEALTH CARE EDUCATION/TRAINING PROGRAM

## 2023-02-16 PROCEDURE — 2700000000 HC OXYGEN THERAPY PER DAY

## 2023-02-16 PROCEDURE — 94664 DEMO&/EVAL PT USE INHALER: CPT

## 2023-02-16 PROCEDURE — 2580000003 HC RX 258: Performed by: STUDENT IN AN ORGANIZED HEALTH CARE EDUCATION/TRAINING PROGRAM

## 2023-02-16 PROCEDURE — 83735 ASSAY OF MAGNESIUM: CPT

## 2023-02-16 PROCEDURE — 94640 AIRWAY INHALATION TREATMENT: CPT

## 2023-02-16 PROCEDURE — 94760 N-INVAS EAR/PLS OXIMETRY 1: CPT

## 2023-02-16 PROCEDURE — 97530 THERAPEUTIC ACTIVITIES: CPT

## 2023-02-16 PROCEDURE — 36415 COLL VENOUS BLD VENIPUNCTURE: CPT

## 2023-02-16 PROCEDURE — 6370000000 HC RX 637 (ALT 250 FOR IP): Performed by: NURSE PRACTITIONER

## 2023-02-16 PROCEDURE — 97110 THERAPEUTIC EXERCISES: CPT

## 2023-02-16 PROCEDURE — 80048 BASIC METABOLIC PNL TOTAL CA: CPT

## 2023-02-16 RX ADMIN — ROSUVASTATIN CALCIUM 10 MG: 5 TABLET, COATED ORAL at 07:17

## 2023-02-16 RX ADMIN — POTASSIUM CHLORIDE 40 MEQ: 1500 TABLET, EXTENDED RELEASE ORAL at 13:22

## 2023-02-16 RX ADMIN — CETIRIZINE HYDROCHLORIDE 10 MG: 10 TABLET, FILM COATED ORAL at 07:17

## 2023-02-16 RX ADMIN — ALBUTEROL SULFATE 2 PUFF: 90 AEROSOL, METERED RESPIRATORY (INHALATION) at 08:07

## 2023-02-16 RX ADMIN — OXYCODONE HYDROCHLORIDE AND ACETAMINOPHEN 1 TABLET: 5; 325 TABLET ORAL at 08:36

## 2023-02-16 RX ADMIN — FUROSEMIDE 20 MG: 20 TABLET ORAL at 07:17

## 2023-02-16 RX ADMIN — LEVOTHYROXINE SODIUM 50 MCG: 50 TABLET ORAL at 07:17

## 2023-02-16 RX ADMIN — FERROUS SULFATE TAB EC 325 MG (65 MG FE EQUIVALENT) 325 MG: 325 (65 FE) TABLET DELAYED RESPONSE at 07:17

## 2023-02-16 RX ADMIN — PANTOPRAZOLE SODIUM 40 MG: 40 TABLET, DELAYED RELEASE ORAL at 07:17

## 2023-02-16 RX ADMIN — ROFLUMILAST 500 MCG: 500 TABLET ORAL at 07:17

## 2023-02-16 RX ADMIN — ASPIRIN 81 MG: 81 TABLET, COATED ORAL at 07:17

## 2023-02-16 RX ADMIN — RIVAROXABAN 20 MG: 10 TABLET, FILM COATED ORAL at 07:17

## 2023-02-16 RX ADMIN — BUDESONIDE AND FORMOTEROL FUMARATE DIHYDRATE 1 PUFF: 160; 4.5 AEROSOL RESPIRATORY (INHALATION) at 08:07

## 2023-02-16 RX ADMIN — SODIUM CHLORIDE, PRESERVATIVE FREE 10 ML: 5 INJECTION INTRAVENOUS at 08:53

## 2023-02-16 RX ADMIN — PREGABALIN 150 MG: 75 CAPSULE ORAL at 07:17

## 2023-02-16 NOTE — PROGRESS NOTES
Patient discharged home with nephew along with a tank of oxygen with instructions on uses and number to call the oxygen company for more oxygen tanks, instructed to NOT smoke near oxygen tank and especially while wearing oxygen to avoid risk of explosion and fire which could lead to serious injury and potentially death, both verbalizes understanding and denies questions.

## 2023-02-16 NOTE — PROGRESS NOTES
[facility]  Progress Note                   Date:   2/16/2023  Patient name: Colin Vaughan  Date of admission:  2/13/2023 10:41 AM  MRN:   [id number]  YOB: 1950  PCP: Danielle Driscoll MD    Reason for Admission: Syncope and collapse [R55]    Subjective:       Clinical Changes /Abnormalities: Patient seen and examined. Denies chest pain or shortness of breath. Tele/vitals/labs reviewed . Discussed case with RN. Review of Systems    Medications:   Scheduled Meds:   metoprolol tartrate  25 mg Oral BID    aspirin  81 mg Oral Daily    ferrous sulfate  325 mg Oral Daily    cetirizine  10 mg Oral Daily    furosemide  20 mg Oral Daily    levothyroxine  50 mcg Oral Daily    pantoprazole  40 mg Oral QAM AC    pregabalin  150 mg Oral BID    Roflumilast  500 mcg Oral Daily    rosuvastatin  10 mg Oral Daily    budesonide-formoterol  1 puff Inhalation BID    rivaroxaban  20 mg Oral Daily with breakfast    sodium chloride flush  5-40 mL IntraVENous 2 times per day    albuterol sulfate HFA  2 puff Inhalation BID     Continuous Infusions:   sodium chloride 75 mL/hr at 02/13/23 1501    sodium chloride       CBC:   Recent Labs     02/13/23  1100   WBC 11.2*   HGB 12.1          BMP:    Recent Labs     02/14/23  0527 02/15/23  0533 02/16/23  0530    137 135   K 3.7 3.7 3.4*   CL 97* 97* 96*   CO2 32* 27 29   BUN 15 19 22   CREATININE 1.13* 1.19* 1.00*   GLUCOSE 99 107* 103*       Hepatic:  Recent Labs     02/13/23  1100   AST 12   ALT 7   BILITOT 0.8   ALKPHOS 104       Troponin:   Recent Labs     02/13/23  1255 02/13/23  1522 02/13/23  1839   TROPHS 62* 54* 53*     ECHO 2/15/23  Summary   Left ventricle is normal in size and mild left ventricular hypertrophy. Global left ventricular systolic function is normal Estimated ejection   fraction is 55.2%. No obvious wall motion abnormality seen. Bi-atrial enlargement. Mild to moderate mitral regurgitation.    Moderate tricuspid regurgitation. Estimated right ventricular systolic   pressure is 54.96 mmHg. Mild pulmonary hypertension. Trivial pulmonic insufficiency. No pericardial effusion seen. BNP: No results for input(s): BNP in the last 72 hours. Lipids: No results for input(s): CHOL, HDL in the last 72 hours. Invalid input(s): LDLCALCU  INR: No results for input(s): INR in the last 72 hours. DATA:    Diagnostics:    EKG: Afib   ECHO: ordered     7/22    Left Ventricle: Systolic function is normal with an ejection fraction   of 55-60%. The EF by Stiles's is 50%. No segmental wall motion   abnormalities. Right Ventricle: Systolic function is normal.     Mitral Valve: There is mild regurgitation. There is no evidence of   mitral valve stenosis. Tricuspid Valve: There is mild regurgitation. There is no evidence of   tricuspid valve stenosis. Pericardium: There is no pericardial effusion. Stress Test: not obtained. Cardiac Angiography: not obtained. Objective:   Vitals: /64   Pulse 75   Temp 98.4 °F (36.9 °C) (Oral)   Resp 20   Ht 5' 3\" (1.6 m)   Wt 169 lb 12.1 oz (77 kg)   SpO2 94%   BMI 30.07 kg/m²   General appearance: alert and cooperative with exam  HEENT: Head: Normocephalic, no lesions, without obvious abnormality.   Neck:no JVD, trachea midline, no adenopathy  Lungs: Clear to auscultation  Heart: irregular rate and rhythm, s1/s2 auscultated, no murmurs  Abdomen: soft, non-tender, bowel sounds active  Extremities: no edema  Neurologic: not done        Assessment / Acute Cardiac Problems:   Questionable syncope vs mechanical fall  Minimally elevated trops could be type 2 secondary to syncope   PAF      Patient Active Problem List:     S/P laparoscopic cholecystectomy     Innominate artery stenosis (HCC)     S/P carotid endarterectomy     Disorder of carotid artery (HCC)     Disorder of lung     Kidney disorder     Vascular disorder     PNA (pneumonia)     History of embolectomy Syncope and collapse     Vision disorder     Chronic atrial fibrillation (HCC)     Hypothyroidism     Hyperlipidemia     Stage 3a chronic kidney disease (HCC)     Chronic bronchitis (HCC)     Elevated troponin      Plan of Treatment:   Syncope vs. Fall. Negative orthostatic BPs.   dizziness , will order compression socks. Cozaar and Norvasc d/c'd   2. PAF. rate controled- Continue Xarelto and  lopressor  3. Keep K>4, Mg>2   4. ECHO reviewed and unchanged from previous.   Ok to d/c and follow up with primary cardiologist as outpt       Electronically signed by RAYMUNDO Venegas CNP on 2/16/2023 at 9:37 AM  [address]  [phone]

## 2023-02-16 NOTE — DISCHARGE INSTR - COC
Continuity of Care Form    Patient Name: Guillermo Murillo   :  1950  MRN:  [id number]    Admit date:  2023  Discharge date:  ***    Code Status Order: Full Code   Advance Directives:     Admitting Physician:  Angelo Capone MD  PCP: Judson Madden MD    Discharging Nurse: [facility] Unit/Room#: 330/330-01  Discharging Unit Phone Number: ***    Emergency Contact:   Extended Emergency Contact Information  Primary Emergency Contact:  [address] Phone: [phone]  Relation: Child  Secondary Emergency Contact:  [address] Phone: [phone]  Relation: Child    Past Surgical History:  Past Surgical History:   Procedure Laterality Date    AORTO-FEMORAL BYPASS GRAFT      CARDIAC CATHETERIZATION  10/2012    no stents    CAROTID ENDARTERECTOMY      CAROTID ENDARTERECTOMY Left 2019    CAROTID ENDARTERECTOMY LEFT (Left Neck)    CAROTID ENDARTERECTOMY Left 2019    CAROTID ENDARTERECTOMY LEFT performed by Ian Jiménez MD at [facility] Bilateral     CHOLECYSTECTOMY  2014    laparscopic    EYE SURGERY      LASIK LEFT EYE    EYE SURGERY      catarat extraction    TOE AMPUTATION      gangrene     TONSILLECTOMY      TUMOR REMOVAL Left     carotid    VASCULAR SURGERY N/A 2022    FEMORAL EMBOLECTOMY THROMBECTOMY performed by Ian Jiménez MD at [facility]       Immunization History:   Immunization History   Administered Date(s) Administered    COVID-19, MODERNA BLUE border, Primary or Immunocompromised, (age 12y+), IM, 100 mcg/0.5mL 2021, 2021    Influenza A ([id number]) Vaccine PF IM 2010    Influenza Virus Vaccine 2012, 2013    Influenza, FLUARIX, FLULAVAL, FLUZONE (age 10 mo+) AND AFLURIA, (age 1 y+), PF, 0.5mL 2015, 2017    Influenza, FLUZONE (age 72 y+), High Dose, 0.7mL 2020    Influenza, High Dose (Fluzone 65 yrs and older) 2018, 2019    Pneumococcal Conjugate 13-valent Mariza Jeffery) 09/19/2018    Pneumococcal Polysaccharide ([id number]) 09/26/2012       Active Problems:  Patient Active Problem List   Diagnosis Code    S/P laparoscopic cholecystectomy Z90.49    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    S/P carotid endarterectomy Z98.890    Disorder of carotid artery (HCC) I77.9    Disorder of lung J98.4    Kidney disorder N28.9    Vascular disorder I99.9    PNA (pneumonia) J18.9    History of embolectomy Z98.890    Syncope and collapse R55    Vision disorder H53.9    Chronic atrial fibrillation (HCC) I48.20    Hypothyroidism E03.9    Hyperlipidemia E78.5    Stage 3a chronic kidney disease (HCC) N18.31    Chronic bronchitis (HCC) J42    Elevated troponin R77.8       Isolation/Infection:   Isolation            No Isolation          Patient Infection Status       Infection Onset Added Last Indicated Last Indicated By Review Planned Expiration Resolved Resolved By    None active    Resolved    COVID-19 (Rule Out) 10/07/21 10/07/21 10/07/21 COVID-19 (Ordered)   10/07/21 Rule-Out Test Resulted            Nurse Assessment:  Last Vital Signs: /64   Pulse 75   Temp 98.4 °F (36.9 °C) (Oral)   Resp 20   Ht 5' 3\" (1.6 m)   Wt 169 lb 12.1 oz (77 kg)   SpO2 (!) 88%   BMI 30.07 kg/m²     Last documented pain score (0-10 scale): Pain Level: 2  Last Weight:   Wt Readings from Last 1 Encounters:   02/14/23 169 lb 12.1 oz (77 kg)     Mental Status:  {IP PT MENTAL STATUS:00232}    IV Access:  { ROSE MARIE IV ACCESS:034651450}    Nursing Mobility/ADLs:  Walking   {P DME FTNP:548286521}  Transfer  {P DME FHAR:703483597}  Bathing  {P DME ETFV:486690425}  Dressing  {CHP DME UPRT:141326727}  Toileting  {CHP DME AASB:499045366}  Feeding  {CHP DME GAPB:143732738}  Med Admin  {P DME VJQS:568731800}  Med Delivery   { ROSE MARIE MED Delivery:731839069}    Wound Care Documentation and Therapy:        Elimination:  Continence:    Bowel: {YES / OH:64034}  Bladder: {YES / FU:72119}  Urinary Catheter: {Urinary Catheter:635527845}   Colostomy/Ileostomy/Ileal Conduit: {YES / RF:42915}       Date of Last BM: ***    Intake/Output Summary (Last 24 hours) at 2/16/2023 0719  Last data filed at 2/16/2023 0542  Gross per 24 hour   Intake --   Output 850 ml   Net -850 ml     I/O last 3 completed shifts:  In: -   Out: 850 [Urine:850]    Safety Concerns:     History of Falls (last 30 days) and At Risk for Falls    Impairments/Disabilities:      None    Nutrition Therapy:  Current Nutrition Therapy:   - Oral Diet:  General    Routes of Feeding: Oral  Liquids: No Restrictions  Daily Fluid Restriction: no  Last Modified Barium Swallow with Video (Video Swallowing Test): not done    Treatments at the Time of Hospital Discharge:   Respiratory Treatments: ***  Oxygen Therapy:  is on oxygen at 2 L/min per nasal cannula.   Ventilator:    - No ventilator support    Rehab Therapies: Physical Therapy and Occupational Therapy  Weight Bearing Status/Restrictions: No weight bearing restrictions  Other Medical Equipment (for information only, NOT a DME order):  walker  Other Treatments: n/a    Patient's personal belongings (please select all that are sent with patient):  Glasses    RN SIGNATURE:  Electronically signed by Tamika Hebert RN on 2/16/23 at 11:42 AM EST    CASE MANAGEMENT/SOCIAL WORK SECTION    Inpatient Status Date: ***    Readmission Risk Assessment Score:  Readmission Risk              Risk of Unplanned Readmission:  18           Discharging to Facility/ Agency   Name:   Address:  Phone:  Fax:    Dialysis Facility (if applicable)   Name:  Address:  Dialysis Schedule:  Phone:  Fax:    / signature: {Esignature:[id number]}    PHYSICIAN SECTION    Prognosis: Good    Condition at Discharge: Unstable    Rehab Potential (if transferring to Rehab): Good    Recommended Labs or Other Treatments After Discharge: None    Physician Certification: I certify the above information and transfer of Xavi Huntley  is necessary for the continuing treatment of the diagnosis listed and that she requires Home Care for less 30 days.      Update Admission H&P: No change in H&P    PHYSICIAN SIGNATURE:  Electronically signed by Umm Ford DO on 2/16/23 at 7:19 AM EST

## 2023-02-16 NOTE — PROGRESS NOTES
Physical Therapy  Facility/Department: [facility]  Daily Treatment Note  NAME: Lainey Greco  : 1950  MRN: [id number]     Date of Service: 2023     Discharge Recommendations:  Patient would benefit from continued therapy after discharge   PT Equipment Recommendations  Equipment Needed: No  Other: The patient has a newer 4WW at home; the 4WW walker with her is more worn and therefore it is recommended that she use her newer 4WW at all times     Patient Diagnosis(es): The encounter diagnosis was Syncope and collapse. Assessment   Assessment: The patient demonstrated decreased safety awareness, slight confusion at times, Minimal assistance with all mobility. The patient will require 24 hour care at discharge based on current mobility. The patient states that her adult grandson will be able to help her. Further therapy recommended at discharge. Activity Tolerance: Patient limited by fatigue and the being easily distractable  Equipment Needed: No  Other: The patient has a newer 4WW at home; the 4WW walker with her is more worn and therefore it is recommended that she use her newer 4WW      Plan    Physcial Therapy Plan  General Plan:  (5-6x/week)  Current Treatment Recommendations: Strengthening;Balance training;Gait training;Functional mobility training;Stair training; Therapeutic activities;Transfer training      Restrictions  Restrictions/Precautions  Restrictions/Precautions: Fall Risk  Required Braces or Orthoses?: No  Position Activity Restriction  Other position/activity restrictions: Up with assistance      Subjective    Subjective  Subjective: The patient was agreeable to therapy. Pain: 3/10 in back (pain medication prior to PT)  Orientation  Overall Orientation Status: Impaired  Orientation Level: Disoriented to situation (the patient does not recall why she is in the hospital);  Oriented to person;Oriented to place;Orientated to time  Cognition  Overall Cognitive Status: Exceptions  Arousal/Alertness: Delayed responses to stimuli  Following Commands: Follows one step commands with increased time; Follows one step commands with repetition  Attention Span: Difficulty attending to directions; Difficulty dividing attention  Memory: Decreased recall of recent events  Safety Judgement: Decreased awareness of need for assistance;Decreased awareness of need for safety  Problem Solving: Decreased awareness of errors  Insights: Not aware of deficits  Initiation: Requires cues for all  Sequencing: Requires cues for al     Objective   SpO2: 94 %  O2 Device: Nasal cannula  Comment: 2L  Bed Mobility Training  Bed Mobility Training: Yes  Overall Level of Assistance: Moderate assistance  Interventions: Safety awareness training; Tactile cues; Verbal cues  Rolling: Minimal assistance  Supine to sit: Moderate assistance (HOB elevated)  Scooting: Minimum assistance  Balance  Sitting:  (SBA- static CGA- dynamic)  Standing:  (CGA- static)  Transfer Training  Transfer Training: Yes  Overall Level of Assistance: Minimum assistance  Interventions: Safety awareness training;Verbal cues; Tactile cues  Sit to Stand: Minimum assistance  Stand to Sit: Minimum assistance  Gait Training  Gait Training: Yes  Gait  Overall Level of Assistance: Minimum assistance  Interventions: Safety awareness training;Verbal cues  Speed/Maya: Pace decreased (< 100 feet/min)  Step Length: Left shortened;Right shortened  Distance (ft): 35 Feet   Assistive Device: Walker, rollator  Safety Devices  Type of Devices: Chair alarm in place;Nurse notified;left in chair; Gait belt;Patient at risk for falls;Call light within reach  Restraints  Restraints Initially in Place: No     Seated LE exercise program: Long Arc Quads, hip abduction/adduction, heel/toe raises, and marches. Reps: 10 with constant cueing for body mechanics and to continue with exercises.        Goals  Short Term Goals  Time Frame for Short Term Goals: 14 visits  Short Term Goal 1: The patient will be able to perform bed mobility with SBA. Short Term Goal 2: The patient will be able to perform transfers with good safety awareness and SBA. Short Term Goal 3: THe patient will ambulate 150ft with 4WW and SBA with good safety awareness. Patient Goals   Patient Goals : To return home     Education  Patient Education  Education Given To: Patient  Education Provided: Role of Therapy;Transfer Training; Fall Prevention Strategies  Education Provided Comments: to get close to chair prior to attempting to sit; increasing step length during gait; reorientation  Education Method: Verbal;Demonstration  Barriers to Learning: Cognition  Education Outcome: Verbalized understanding;Continued education needed     Therapy Time    Individual Concurrent Group Co-treatment   Time In 1030      Time Out 1053      Minutes 23      Timed Code Treatment Minutes: [address] [facility],

## 2023-02-16 NOTE — PLAN OF CARE
Problem: Discharge Planning  Goal: Discharge to home or other facility with appropriate resources  2/16/2023 0103 by Eliud Cornell RN  Outcome: Progressing   Patient actively participates in ADLs and decision making regarding plan of care     Problem: Pain  Goal: Verbalizes/displays adequate comfort level or baseline comfort level  2/16/2023 0103 by Eliud Cornell RN  Outcome: Progressing   No new signs/symptoms of pain noted, pain rating < 3 on scale 0-10, pain controlled with medication/ repositioning     Problem: Safety - Adult  Goal: Free from fall injury  2/16/2023 0103 by Eliud Cornell RN  Outcome: Progressing   No falls/injuries this shift, bed in lowest position, breaks on, bed alarm on, call light within reach, side rails up X2     Problem: Respiratory - Adult  Goal: Achieves optimal ventilation and oxygenation  2/16/2023 0103 by Eliud Cornell RN  Outcome: Progressing   Oxygen administered as needed. Pulse oximetry levels WNL. No cyanosis noted. Repositioned to encourage proper ventilation.

## 2023-02-16 NOTE — RT PROTOCOL NOTE
RT Inhaler-Nebulizer Bronchodilator Protocol Note    There is a bronchodilator order in the chart from a provider indicating to follow the RT Bronchodilator Protocol and there is an Initiate RT Inhaler-Nebulizer Bronchodilator Protocol order as well (see protocol at bottom of note). CXR Findings:  XR CHEST PORTABLE    Result Date: 2/15/2023  Pulmonary vascular gesture along with increased interstitial opacity. Please correlate with clinical evidence of interstitial edema. Bibasilar airspace disease, edema versus atelectasis versus pneumonia. The findings from the last RT Protocol Assessment were as follows:   History Pulmonary Disease: Chronic pulmonary disease  Respiratory Pattern: Regular pattern and RR 12-20 bpm  Breath Sounds: Slightly diminished and/or crackles  Cough: Strong, productive  Indication for Bronchodilator Therapy: On home bronchodilators, Decreased or absent breath sounds  Bronchodilator Assessment Score: 5    Aerosolized bronchodilator medication orders have been revised according to the RT Inhaler-Nebulizer Bronchodilator Protocol below. Respiratory Therapist to perform RT Therapy Protocol Assessment initially then follow the protocol. Repeat RT Therapy Protocol Assessment PRN for score 0-3 or on second treatment, BID, and PRN for scores above 3. No Indications - adjust the frequency to every 6 hours PRN wheezing or bronchospasm, if no treatments needed after 48 hours then discontinue using Per Protocol order mode. If indication present, adjust the RT bronchodilator orders based on the Bronchodilator Assessment Score as indicated below. Use Inhaler orders unless patient has one or more of the following: on home nebulizer, not able to hold breath for 10 seconds, is not alert and oriented, cannot activate and use MDI correctly, or respiratory rate 25 breaths per minute or more, then use the equivalent nebulizer order(s) with same Frequency and PRN reasons based on the score. If a patient is on this medication at home then do not decrease Frequency below that used at home. 0-3 - enter or revise RT bronchodilator order(s) to equivalent RT Bronchodilator order with Frequency of every 4 hours PRN for wheezing or increased work of breathing using Per Protocol order mode. 4-6 - enter or revise RT Bronchodilator order(s) to two equivalent RT bronchodilator orders with one order with BID Frequency and one order with Frequency of every 4 hours PRN wheezing or increased work of breathing using Per Protocol order mode. 7-10 - enter or revise RT Bronchodilator order(s) to two equivalent RT bronchodilator orders with one order with TID Frequency and one order with Frequency of every 4 hours PRN wheezing or increased work of breathing using Per Protocol order mode. 11-13 - enter or revise RT Bronchodilator order(s) to one equivalent RT bronchodilator order with QID Frequency and an Albuterol order with Frequency of every 4 hours PRN wheezing or increased work of breathing using Per Protocol order mode. Greater than 13 - enter or revise RT Bronchodilator order(s) to one equivalent RT bronchodilator order with every 4 hours Frequency and an Albuterol order with Frequency of every 2 hours PRN wheezing or increased work of breathing using Per Protocol order mode. RT to enter RT Home Evaluation for COPD & MDI Assessment order using Per Protocol order mode.     Electronically signed by Omar Villeda RCP on 2/15/2023 at 10:07 PM

## 2023-02-16 NOTE — PROGRESS NOTES
[facility]  Office: [address], DO, Chhaya Watts, DO, Diane Hammond, DO, [facility] Blood, DO, Scott Polo MD, Genesis Torres MD, Maxwell Cope MD, Francisca Norwood MD,  Angella Mesa MD, Page Patel MD, Netta Payan, DO, Lucie Snider MD,  Miguel Harrison MD, Paula Bahena MD, Belinda Sims, DO, Malissa Marie MD, Ericka Roldan MD, Adela Najera, DO, Bhavin Carty MD, Karoline Dakin, MD, Janie Banks MD, Shae Kent MD, Wilma Myles DO, Dean Clark MD, Hari Herrera MD, Maria Esther Carcamo, CNP,  Nickie Chavez, CNP, Nicolas Rivera, CNP, Araseli Chaves, CNP,  Jd Gregorio, [facility], Ingrid Mane, CNP, Seema Alvarado, CNP, Isaias Pfeiffer, CNP, Angelica Chambers, CNP, Arie Alexander, CNP, ОЛЬГА BassettC, Julieta Morse, CNS, Elaine Cho, CNP, [address]    Progress Note    2/16/2023    12:13 PM    Name:   Ari Silva  MRN:     [id number]     Acct:      [de-identified]   Room:   [address] [place] [address] Day:  3  Admit Date:  2/13/2023 10:41 AM    PCP:   Adrian Glover MD  Code Status:  Full Code    Subjective:     C/C:   Chief Complaint   Patient presents with    Fall     Patient seen in follow-up for fall/syncope. Patient states \"I feel okay\"    Interval History Status: improved. Patient is doing reasonably well. Echocardiogram reviewed, no significant changes. Cardiology input noted and appreciated. At this point in time her Norvasc and Coreg have been discontinued due to hypotension. She is on Lopressor for rate control for paroxysmal atrial fibrillation. She can be discharged home with outpatient follow-up. The patient has undergone home oxygen evaluation and qualifies for 2 L/min. This has been discussed with the patient and she denies any questions or concerns at this point in time.     Brief History:     Ari Silva is a 81 y.o. female with a past medical history of persistent atrial fibrillation (on Xarelto), COPD, hypothyroidism, hypertension, hyperlipidemia and tobacco abuse who presented to the emergency department on 2/13/2023 complaining of a fall and rib pain. The patient states that she was having a sleep study done last night and woke up in the middle of the night to use the restroom. She states that she fell on her way to the restroom but does not remember how she fell (patient states she was very drowsy at the time). The patient states that she realized she had fallen when people were standing around her. She denies headache or chest pain prior to the fall and did not hit her head during the fall. She decided to come to the emergency department this morning due to persistent rib pain. In the ED, the patient was afebrile and nontoxic appearing. Chest x-ray was unremarkable and CBC and BMP were at baseline for the patient. Troponin was found to be elevated at 68 with no baseline to compare to. The patient is admitted to internal medicine for further management of presumed syncope and collapse. Review of Systems:     Constitutional:  negative for chills, fevers, sweats  Respiratory:  negative for cough, dyspnea on exertion, shortness of breath, wheezing  Cardiovascular:  negative for chest pain, chest pressure/discomfort, lower extremity edema, palpitations  Gastrointestinal:  negative for abdominal pain, constipation, diarrhea, nausea, vomiting  Neurological:  negative for dizziness, headache    Medications: Allergies:     Allergies   Allergen Reactions    Iv Contrast [Iodides] Other (See Comments)     Due to kidney problems       Current Meds:   Scheduled Meds:    metoprolol tartrate  25 mg Oral BID    aspirin  81 mg Oral Daily    ferrous sulfate  325 mg Oral Daily    cetirizine  10 mg Oral Daily    furosemide  20 mg Oral Daily    levothyroxine  50 mcg Oral Daily    pantoprazole  40 mg Oral QAM AC    pregabalin  150 mg Oral BID    Roflumilast  500 mcg Oral Daily    rosuvastatin  10 mg Oral Daily    budesonide-formoterol  1 puff Inhalation BID    rivaroxaban  20 mg Oral Daily with breakfast    sodium chloride flush  5-40 mL IntraVENous 2 times per day    albuterol sulfate HFA  2 puff Inhalation BID     Continuous Infusions:    sodium chloride 75 mL/hr at 02/13/23 1501    sodium chloride       PRN Meds: oxyCODONE-acetaminophen, albuterol sulfate HFA, ALPRAZolam, sodium chloride flush, sodium chloride, potassium chloride **OR** potassium alternative oral replacement **OR** potassium chloride, magnesium sulfate, ondansetron **OR** ondansetron, polyethylene glycol, acetaminophen **OR** acetaminophen, oxyCODONE-acetaminophen    Data:     Past Medical History:   has a past medical history of [facility]-fib ([facility] Anxiety, Arthritis, Blood transfusion reaction, CAD (coronary artery disease), Carotid stenosis, COPD (chronic obstructive pulmonary disease) ([facility] GERD (gastroesophageal reflux disease), Hepatitis B, History of blood transfusion, Hyperlipidemia, Hypertension, Hypothyroid, Kidney disease, Legally blind, Macular edema, PVD (peripheral vascular disease) ([facility] Sleep apnea, and Subclavian artery stenosis ([facility] Social History:   reports that she has been smoking. She has been smoking an average of 1 pack per day. She has quit using smokeless tobacco. She reports that she does not drink alcohol and does not use drugs.      Family History:   Family History   Problem Relation Age of Onset    Heart Attack Father     Stroke Father     High Blood Pressure Father     Heart Disease Father     Cancer Mother         Multiple myeloma    Cancer Sister         Lung with mets to brain    Cancer Maternal Aunt         Several aunts had unknown cancer    Heart Attack Brother     Liver Disease Brother     Heart Defect Sister        Vitals:  /64   Pulse 75   Temp 98.4 °F (36.9 °C) (Oral)   Resp 20   Ht 5' 3\" (1.6 m)   Wt 169 lb 12.1 oz (77 kg)   SpO2 94%   BMI 30.07 kg/m² Temp (24hrs), Av.7 °F (37.1 °C), Min:98.2 °F (36.8 °C), Max:99.1 °F (37.3 °C)    No results for input(s): POCGLU in the last 72 hours. I/O (24Hr): Intake/Output Summary (Last 24 hours) at 2023 1213  Last data filed at 2023 0542  Gross per 24 hour   Intake --   Output 850 ml   Net -850 ml       Labs:  Hematology:No results for input(s): WBC, RBC, HGB, HCT, MCV, MCH, MCHC, RDW, PLT, MPV, SEDRATE, CRP, INR, DDIMER, MG9GWJGH, LABABSO in the last 72 hours. Invalid input(s): PT  Chemistry:  Recent Labs     23  1255 23  1522 23  1839 23  0527 02/15/23  0533 02/15/23  0534 23  0530   NA  --   --   --  138 137  --  135   K  --   --   --  3.7 3.7  --  3.4*   CL  --   --   --  97* 97*  --  96*   CO2  --   --   --  32* 27  --  29   GLUCOSE  --   --   --  99 107*  --  103*   BUN  --   --   --  15 19  --  22   CREATININE  --   --   --  1.13* 1.19*  --  1.00*   MG  --   --   --  1.9  --  1.8 2.0   ANIONGAP  --   --   --  9 13  --  10   LABGLOM  --   --   --  52* 49*  --  >60   CALCIUM  --   --   --  9.7 10.1  --  9.1   PROBNP  --  440*  --   --   --   --   --    TROPHS 62* 54* 53*  --   --   --   --    No results for input(s): PROT, LABALBU, LABA1C, Q4JGVRA, T1OIKVD, FT4, TSH, AST, ALT, LDH, GGT, ALKPHOS, LABGGT, BILITOT, BILIDIR, AMMONIA, AMYLASE, LIPASE, LACTATE, CHOL, HDL, LDLCHOLESTEROL, CHOLHDLRATIO, TRIG, VLDL, TRE09MJ, PHENYTOIN, PHENYF, URICACID, POCGLU in the last 72 hours. ABG:No results found for: POCPH, PHART, PH, POCPCO2, LFM0GWR, PCO2, POCPO2, PO2ART, PO2, POCHCO3, GGN2VZA, HCO3, NBEA, PBEA, BEART, BE, THGBART, THB, PTX1PEQ, OOAN7LNT, A7ALEIWB, O2SAT, FIO2  No results found for: SPECIAL  Lab Results   Component Value Date/Time    CULTURE KLEBSIELLA PNEUMONIAE >[id number] CFU/ML (A) 10/06/2022 09:03 AM       Radiology:  XR CHEST PA LATERAL W BOTH OBLIQUE PROJECTIONS    Result Date: 2023  1. No radiographic evidence for pneumothorax.   A small pneumothorax could potentially be occult on this exam.  If this remains a clinical concern CT scan would be advised. 2. No radiographic evidence for acute left rib fracture. XR CHEST PORTABLE    Result Date: 2/15/2023  Pulmonary vascular gesture along with increased interstitial opacity. Please correlate with clinical evidence of interstitial edema. Bibasilar airspace disease, edema versus atelectasis versus pneumonia.        Physical Examination:       General appearance:  alert, cooperative and no distress  Mental Status:  oriented to person, place and time and normal affect  Lungs:  clear to auscultation bilaterally, normal effort  Heart: Irregular rhythm, rate controlled  Abdomen:  soft, nontender, nondistended, normal bowel sounds, no masses, hepatomegaly, splenomegaly  Extremities:  no edema, redness, tenderness in the calves  Skin:  no gross lesions, rashes, induration    Assessment:     Hospital Problems             Last Modified POA    * (Principal) Syncope and collapse 2/13/2023 Yes    Chronic atrial fibrillation ([address] 2/13/2023 Yes    Hypothyroidism 2/13/2023 Yes    Hyperlipidemia 2/13/2023 Yes    Chronic bronchitis ([address] 2/13/2023 Yes    Elevated troponin 2/13/2023 Yes    Disorder of carotid artery ([address] 2/13/2023 Yes       Plan:     Syncope  Likely secondary to hypotension, Cozaar/Norvasc discontinued  Echocardiogram unchanged, appreciate cardiology input  Elevated troponin  Likely secondary to hypotension, not consistent with acute coronary syndrome  Echocardiogram unchanged  COPD/chronic hypoxic respiratory failure  Patient qualifies for home oxygen  Respiratory status at baseline, no wheezing  Chronic atrial fibrillation  Rate controlled, continue Lopressor/Xarelto  Essential hypertension  Continue Lopressor, Norvasc and Cozaar discontinued  Hyperlipidemia  Continue statin  Hypothyroidism  Continue Synthroid    Discharge home    Reyna Ghosh DO  2/16/2023  12:13 PM

## 2023-02-16 NOTE — DISCHARGE INSTRUCTIONS
Please contact [facility] at [phone] upon getting home to have oxygen concentrator and additional tanks delivered.

## 2023-02-16 NOTE — CARE COORDINATION
Transitional Planning    [facility] DME oxygen order to [address] with [employer] [facility]- they can accept    [phone] with Rosa Anglin @ [facility].  Can give patient a [employer] provided tank and have patient call office upon getting home

## 2023-02-16 NOTE — PLAN OF CARE
Problem: Respiratory - Adult  Goal: Achieves optimal ventilation and oxygenation  2/15/2023 2215 by Delfina Elias RCP  Outcome: Progressing  Flowsheets (Taken 2/15/2023 2215)  Achieves optimal ventilation and oxygenation:   Assess for changes in respiratory status   Assess for changes in mentation and behavior   Oxygen supplementation based on oxygen saturation or arterial blood gases   Position to facilitate oxygenation and minimize respiratory effort   Assess and instruct to report shortness of breath or any respiratory difficulty   Respiratory therapy support as indicated  Note: BRONCHOSPASM/BRONCHOCONSTRICTION     [x]         IMPROVE AERATION/BREATH SOUNDS  [x]   ADMINISTER BRONCHODILATOR THERAPY AS APPROPRIATE  [x]   ASSESS BREATH SOUNDS  [x]   IMPLEMENT AEROSOL/MDI PROTOCOL  [x]   PATIENT EDUCATION AS NEEDED   NON INVASIVE VENTILATION  PROVIDE OPTIMAL VENTILATION/ACCEPTABLE SP02  IMPLEMENT NON INVASIVE VENTILATION PROTOCOL  ASSESSMENT SKIN INTEGRITY  PATIENT EDUCATION AS NEEDED  BIPAP AS NEEDED

## 2023-02-16 NOTE — PROGRESS NOTES
Patient was evaluated today for the diagnosis of COPD. I entered a DME order for home oxygen because the diagnosis and testing requires the patient to have supplemental oxygen. Condition will improve or be benefited by oxygen use. The patient is  able to perform good mobility in a home setting and therefore does require the use of a portable oxygen system. The need for this equipment was discussed with the patient and she understands and is in agreement.      Electronically signed by Juvenal Meadows DO on 2/16/2023 at 12:12 PM

## 2023-02-16 NOTE — DISCHARGE SUMMARY
[facility]  Office: [address], DO, Jean Feldman, DO, Rajan Lorenzo, DO, Fela Harpal Blood, DO, Maggie Osullivan MD, Nuris Desai MD, Vicki Ramirez MD, Cyndy Woods MD,  Camilo Cameron MD, Perry Coleman MD, Deborah Corrigan, DO, Rosangela Ryan MD,  Fernanda Barrera DO, Jose Whitt MD, Sam Mills MD, Sharon Flores DO, Tennille Loyola MD, Stevie Esteban MD, Varun Canchola DO, Reese Ahmadi MD, Bobby Bridges MD, Ko Saldana MD, Bob Gardner MD, Suki Blank DO, Ghanshyam Randolph MD, Adam Gerard MD, Deborah Carrizales, CNP,  Sirena Doshi, CNP, Arvind Galicia, CNP, Sherry Piedra, CNP,  Siena Krishna, [facility], Danica Mackay, CNP, Yara Adler, CNP, Gale Suarez, CNP, Jaxon Chávez, CNP, Igor Harrell, CNP, Luis Morrison PA-C, Juan Peace, CNS, Mamadou Dumont, CNP, Melissa Clay, CNP         [facility]    Discharge Summary     Patient ID: Karen Waterman  :  1950   MRN: [id number]     ACCOUNT:  [de-identified]   Patient's PCP: Luciana Brumfield MD  Admit Date: 2023   Discharge Date: 2023     Length of Stay: 3  Code Status:  Full Code  Admitting Physician: Sam Mills MD  Discharge Physician: Umm Ford DO     Active Discharge Diagnoses:     Hospital Problem Lists:  Principal Problem:    Syncope and collapse  Active Problems:    Chronic atrial fibrillation [facility])    Hypothyroidism    Hyperlipidemia    Chronic bronchitis (HCC)    Elevated troponin    Disorder of carotid artery [facility])  Resolved Problems:    * No resolved hospital problems. *      Admission Condition:  fair     Discharged Condition: good    Hospital Stay:     Hospital Course:  Karen Waterman is a 81 y.o. female who was admitted for the management of  Syncope and collapse , presented to ER with Fall    This 72-year-old female was undergoing a sleep study when she developed a syncopal episode.   She was brought to the hospital and while in the hospital she was found to be hypotensive. Her syncope is felt to be secondary to have her Cozaar and Norvasc were discontinued. Echocardiogram was obtained which was relatively unrevealing. She had no significant changes from previous echocardiogram.  Cardiology was involved in the case and she was placed on Lopressor for her atrial fibrillation. Blood pressure was stable and she tolerated Lopressor 25 mg twice daily. While in the hospital she was found to have hypoxia and ultimately did qualify for home oxygen due to her known history of COPD. The patient ultimately is discharged home in stable condition. Significant therapeutic interventions: As above    Significant Diagnostic Studies:   Labs / Micro:  CBC:   Lab Results   Component Value Date/Time    WBC 11.2 02/13/2023 11:00 AM    RBC 3.90 02/13/2023 11:00 AM    HGB 12.1 02/13/2023 11:00 AM    HCT 36.6 02/13/2023 11:00 AM    MCV 93.8 02/13/2023 11:00 AM    MCH 31.0 02/13/2023 11:00 AM    MCHC 33.1 02/13/2023 11:00 AM    RDW 14.5 02/13/2023 11:00 AM     02/13/2023 11:00 AM     BMP:    Lab Results   Component Value Date/Time    GLUCOSE 103 02/16/2023 05:30 AM     02/16/2023 05:30 AM    K 3.4 02/16/2023 05:30 AM    CL 96 02/16/2023 05:30 AM    CO2 29 02/16/2023 05:30 AM    ANIONGAP 10 02/16/2023 05:30 AM    BUN 22 02/16/2023 05:30 AM    CREATININE 1.00 02/16/2023 05:30 AM    BUNCRER 10 09/23/2022 08:21 AM    CALCIUM 9.1 02/16/2023 05:30 AM    LABGLOM >60 02/16/2023 05:30 AM    GFRAA >60 09/23/2022 08:21 AM    GFR      09/23/2022 08:21 AM        Radiology:  XR CHEST PA LATERAL W BOTH OBLIQUE PROJECTIONS    Result Date: 2/13/2023  1. No radiographic evidence for pneumothorax. A small pneumothorax could potentially be occult on this exam.  If this remains a clinical concern CT scan would be advised. 2. No radiographic evidence for acute left rib fracture.      XR CHEST PORTABLE    Result Date: 2/15/2023  Pulmonary vascular gesture along with increased interstitial opacity. Please correlate with clinical evidence of interstitial edema. Bibasilar airspace disease, edema versus atelectasis versus pneumonia. Consultations:    Consults:     Final Specialist Recommendations/Findings:   IP CONSULT TO SOCIAL WORK  IP CONSULT TO CARDIOLOGY  IP CONSULT TO HOME CARE NEEDS      The patient was seen and examined on day of discharge and this discharge summary is in conjunction with any daily progress note from day of discharge.     Discharge plan:     Disposition: Home    Physician Follow Up:     Ghassan Augustin MD  [address]  [phone]  [facility] [id number] [phone]    Follow up       Requiring Further Evaluation/Follow Up POST HOSPITALIZATION/Incidental Findings: None    Diet: regular diet    Activity: As tolerated    Instructions to Patient: None    Discharge Medications:      Medication List        START taking these medications      metoprolol tartrate 25 MG tablet  Commonly known as: LOPRESSOR  Take 1 tablet by mouth 2 times daily            CONTINUE taking these medications      albuterol sulfate  (90 Base) MCG/ACT inhaler  Commonly known as: PROVENTIL;VENTOLIN;PROAIR     alendronate 35 MG tablet  Commonly known as: FOSAMAX     ALPRAZolam 1 MG tablet  Commonly known as: XANAX     aspirin 81 MG EC tablet     calcium carbonate 1500 (600 Ca) MG Tabs tablet     ferrous sulfate 325 (65 Fe) MG tablet  Commonly known as: IRON 325     fexofenadine 180 MG tablet  Commonly known as: ALLEGRA     folic acid 1 MG tablet  Commonly known as: FOLVITE     furosemide 40 MG tablet  Commonly known as: LASIX     levothyroxine 50 MCG tablet  Commonly known as: SYNTHROID     omeprazole 40 MG delayed release capsule  Commonly known as: PRILOSEC     pregabalin 150 MG capsule  Commonly known as: LYRICA     Roflumilast 500 MCG tablet  Commonly known as: DALIRESP     rosuvastatin 10 MG tablet  Commonly known as: CRESTOR     Symbicort 160-4.5 MCG/ACT Aero  Generic drug: budesonide-formoterol     vitamin D 50 MCG (2000 UT) Tabs tablet  Commonly known as: CHOLECALCIFEROL     Xarelto 20 MG Tabs tablet  Generic drug: rivaroxaban            STOP taking these medications      amLODIPine 5 MG tablet  Commonly known as: NORVASC     losartan 50 MG tablet  Commonly known as: COZAAR     varenicline 1 MG tablet  Commonly known as: CHANTIX               Where to Get Your Medications        These medications were sent to [address] [place] [address]      Phone: [phone]   metoprolol tartrate 25 MG tablet         No discharge procedures on file. Time Spent on discharge is  20 mins in patient examination, evaluation, counseling as well as medication reconciliation, prescriptions for required medications, discharge plan and follow up. Electronically signed by   Liborio Jimenez DO  2/16/2023  12:16 PM      Thank you Dr. Susie Mejia MD for the opportunity to be involved in this patient's care.

## 2023-02-17 ENCOUNTER — APPOINTMENT (OUTPATIENT)
Dept: GENERAL RADIOLOGY | Age: 73
End: 2023-02-17
Payer: MEDICARE

## 2023-02-17 ENCOUNTER — HOSPITAL ENCOUNTER (EMERGENCY)
Age: 73
Discharge: HOME | End: 2023-02-17
Attending: EMERGENCY MEDICINE
Payer: MEDICARE

## 2023-02-17 ENCOUNTER — APPOINTMENT (OUTPATIENT)
Dept: CT IMAGING | Age: 73
End: 2023-02-17
Payer: MEDICARE

## 2023-02-17 VITALS
HEIGHT: 63 IN | DIASTOLIC BLOOD PRESSURE: 56 MMHG | OXYGEN SATURATION: 97 % | RESPIRATION RATE: 17 BRPM | BODY MASS INDEX: 29.77 KG/M2 | SYSTOLIC BLOOD PRESSURE: 125 MMHG | HEART RATE: 103 BPM | WEIGHT: 168 LBS | TEMPERATURE: 98.1 F

## 2023-02-17 DIAGNOSIS — R29.6 FREQUENT FALLS: Primary | ICD-10-CM

## 2023-02-17 DIAGNOSIS — S22.31XA CLOSED FRACTURE OF ONE RIB OF RIGHT SIDE, INITIAL ENCOUNTER: ICD-10-CM

## 2023-02-17 LAB
ABSOLUTE EOS #: 0.03 K/UL (ref 0–0.4)
ABSOLUTE LYMPH #: 0.75 K/UL (ref 1–4.8)
ABSOLUTE MONO #: 0.73 K/UL (ref 0.1–1.2)
ACETAMINOPHEN LEVEL: <5 UG/ML (ref 10–30)
ALBUMIN SERPL-MCNC: 3.9 G/DL (ref 3.5–5.2)
ALBUMIN/GLOBULIN RATIO: 1.3 (ref 1–2.5)
ALP SERPL-CCNC: 362 U/L (ref 35–104)
ALT SERPL-CCNC: 75 U/L (ref 5–33)
ANION GAP SERPL CALCULATED.3IONS-SCNC: 13 MMOL/L (ref 9–17)
AST SERPL-CCNC: 170 U/L
BACTERIA: ABNORMAL
BASOPHILS # BLD: 0 % (ref 0–2)
BASOPHILS ABSOLUTE: 0 K/UL (ref 0–0.2)
BILIRUB SERPL-MCNC: 2.2 MG/DL (ref 0.3–1.2)
BILIRUBIN URINE: NEGATIVE
BUN SERPL-MCNC: 20 MG/DL (ref 8–23)
CALCIUM SERPL-MCNC: 9.5 MG/DL (ref 8.6–10.4)
CHLORIDE SERPL-SCNC: 92 MMOL/L (ref 98–107)
CO2 SERPL-SCNC: 28 MMOL/L (ref 20–31)
COLOR: YELLOW
CREAT SERPL-MCNC: 1.16 MG/DL (ref 0.5–0.9)
D DIMER BLD IA.RAPID-MCNC: 3.86 MG/L FEU
EOSINOPHILS RELATIVE PERCENT: 0 % (ref 1–4)
EPITHELIAL CELLS UA: ABNORMAL /HPF (ref 0–5)
ETHANOL PERCENT: <0.01 %
ETHANOL: <10 MG/DL
GFR SERPL CREATININE-BSD FRML MDRD: 51 ML/MIN/1.73M2
GLUCOSE SERPL-MCNC: 130 MG/DL (ref 70–99)
GLUCOSE UR STRIP.AUTO-MCNC: NEGATIVE MG/DL
HCT VFR BLD AUTO: 31.8 % (ref 36–46)
HGB BLD-MCNC: 10.8 G/DL (ref 12–16)
KETONES UR STRIP.AUTO-MCNC: NEGATIVE MG/DL
LEUKOCYTE ESTERASE UR QL STRIP.AUTO: NEGATIVE
LYMPHOCYTES # BLD: 6 % (ref 24–44)
MCH RBC QN AUTO: 31.2 PG (ref 26–34)
MCHC RBC AUTO-ENTMCNC: 34 G/DL (ref 31–37)
MCV RBC AUTO: 91.9 FL (ref 80–100)
MONOCYTES # BLD: 6 % (ref 2–11)
NITRITE UR QL STRIP.AUTO: NEGATIVE
PDW BLD-RTO: 14.7 % (ref 12.5–15.4)
PLATELET # BLD AUTO: 300 K/UL (ref 140–450)
PMV BLD AUTO: 11.3 FL (ref 8–14)
POTASSIUM SERPL-SCNC: 3.7 MMOL/L (ref 3.7–5.3)
PROT SERPL-MCNC: 7 G/DL (ref 6.4–8.3)
PROT UR STRIP.AUTO-MCNC: 6 MG/DL (ref 5–8)
PROT UR STRIP.AUTO-MCNC: ABNORMAL MG/DL
RBC # BLD: 3.46 M/UL (ref 4–5.2)
RBC CLUMPS #/AREA URNS AUTO: ABNORMAL /HPF (ref 0–2)
SALICYLATE LEVEL: <1 MG/DL (ref 3–10)
SEG NEUTROPHILS: 88 % (ref 36–66)
SEGMENTED NEUTROPHILS ABSOLUTE COUNT: 10.16 K/UL (ref 1.8–7.7)
SODIUM SERPL-SCNC: 133 MMOL/L (ref 135–144)
SPECIFIC GRAVITY UA: 1.01 (ref 1–1.03)
TOXIC TRICYCLIC SC,BLOOD: NEGATIVE
TROPONIN I SERPL DL<=0.01 NG/ML-MCNC: 100 NG/L (ref 0–14)
TROPONIN I SERPL DL<=0.01 NG/ML-MCNC: 66 NG/L (ref 0–14)
TURBIDITY: ABNORMAL
URINE HGB: NEGATIVE
UROBILINOGEN, URINE: NORMAL
WBC # BLD AUTO: 11.7 K/UL (ref 3.5–11)
WBC UA: ABNORMAL /HPF (ref 0–5)

## 2023-02-17 PROCEDURE — 72125 CT NECK SPINE W/O DYE: CPT

## 2023-02-17 PROCEDURE — 36415 COLL VENOUS BLD VENIPUNCTURE: CPT

## 2023-02-17 PROCEDURE — 80307 DRUG TEST PRSMV CHEM ANLYZR: CPT

## 2023-02-17 PROCEDURE — 74177 CT ABD & PELVIS W/CONTRAST: CPT

## 2023-02-17 PROCEDURE — 80143 DRUG ASSAY ACETAMINOPHEN: CPT

## 2023-02-17 PROCEDURE — 99285 EMERGENCY DEPT VISIT HI MDM: CPT

## 2023-02-17 PROCEDURE — 85379 FIBRIN DEGRADATION QUANT: CPT

## 2023-02-17 PROCEDURE — 71260 CT THORAX DX C+: CPT | Performed by: EMERGENCY MEDICINE

## 2023-02-17 PROCEDURE — 84484 ASSAY OF TROPONIN QUANT: CPT

## 2023-02-17 PROCEDURE — 80053 COMPREHEN METABOLIC PANEL: CPT

## 2023-02-17 PROCEDURE — 71045 X-RAY EXAM CHEST 1 VIEW: CPT

## 2023-02-17 PROCEDURE — 2580000003 HC RX 258: Performed by: EMERGENCY MEDICINE

## 2023-02-17 PROCEDURE — 85025 COMPLETE CBC W/AUTO DIFF WBC: CPT

## 2023-02-17 PROCEDURE — G0480 DRUG TEST DEF 1-7 CLASSES: HCPCS

## 2023-02-17 PROCEDURE — 6360000004 HC RX CONTRAST MEDICATION: Performed by: EMERGENCY MEDICINE

## 2023-02-17 PROCEDURE — 81001 URINALYSIS AUTO W/SCOPE: CPT

## 2023-02-17 PROCEDURE — 70450 CT HEAD/BRAIN W/O DYE: CPT

## 2023-02-17 PROCEDURE — 80179 DRUG ASSAY SALICYLATE: CPT

## 2023-02-17 RX ORDER — LIDOCAINE HYDROCHLORIDE 10 MG/ML
5 INJECTION, SOLUTION EPIDURAL; INFILTRATION; INTRACAUDAL; PERINEURAL ONCE
Status: DISCONTINUED | OUTPATIENT
Start: 2023-02-17 | End: 2023-02-17 | Stop reason: HOSPADM

## 2023-02-17 RX ORDER — SODIUM CHLORIDE 0.9 % (FLUSH) 0.9 %
10 SYRINGE (ML) INJECTION PRN
Status: DISCONTINUED | OUTPATIENT
Start: 2023-02-17 | End: 2023-02-17 | Stop reason: HOSPADM

## 2023-02-17 RX ORDER — SODIUM CHLORIDE 9 MG/ML
25 INJECTION, SOLUTION INTRAVENOUS PRN
Status: DISCONTINUED | OUTPATIENT
Start: 2023-02-17 | End: 2023-02-17 | Stop reason: HOSPADM

## 2023-02-17 RX ORDER — 0.9 % SODIUM CHLORIDE 0.9 %
80 INTRAVENOUS SOLUTION INTRAVENOUS ONCE
Status: DISCONTINUED | OUTPATIENT
Start: 2023-02-17 | End: 2023-02-17 | Stop reason: HOSPADM

## 2023-02-17 RX ORDER — SODIUM CHLORIDE 0.9 % (FLUSH) 0.9 %
10 SYRINGE (ML) INJECTION EVERY 12 HOURS SCHEDULED
Status: DISCONTINUED | OUTPATIENT
Start: 2023-02-17 | End: 2023-02-17 | Stop reason: HOSPADM

## 2023-02-17 RX ORDER — DILTIAZEM HYDROCHLORIDE 5 MG/ML
10 INJECTION INTRAVENOUS ONCE
Status: DISCONTINUED | OUTPATIENT
Start: 2023-02-17 | End: 2023-02-17 | Stop reason: HOSPADM

## 2023-02-17 RX ADMIN — IOPAMIDOL 100 ML: 755 INJECTION, SOLUTION INTRAVENOUS at 18:26

## 2023-02-17 RX ADMIN — Medication 100 ML: at 18:28

## 2023-02-17 RX ADMIN — SODIUM CHLORIDE, PRESERVATIVE FREE 10 ML: 5 INJECTION INTRAVENOUS at 18:27

## 2023-02-17 NOTE — ED NOTES
Spoke with Stanley Cole at [facility] [facility] for midline.      Senthil Vargas RN  02/17/23 [id number]

## 2023-02-17 NOTE — ED PROVIDER NOTES
[address] [facility] Emergency Department  [address] [address] [facility] [address] [facility] [address]  Phone: [phone]  Fax: [phone]        Pt Name: Emeka Stratton  MRN: [id number]  [place] 1950  Date of evaluation: 2/17/23      CHIEF COMPLAINT     Chief Complaint   Patient presents with    Altered Mental Status         HISTORY OF PRESENT ILLNESS  (Location/Symptom, Timing/Onset, Context/Setting, Quality, Duration, Modifying Factors, Severity.)    Emeka Stratton is a 81 y.o. female who presents after having a fall at home. She states she did strike her head. She was brought to the ER after that fall and got admitted. The patient's family member fat bedside (cousin) eels she is altered. She was trying to plug the phone into the oxygen machine. The patient herself is alert and oriented x 1. She states she had a little bit of nausea today. She denies pain. REVIEW OF SYSTEMS    (2-9 systems for level 4, 10 or more for level 5)     Review of Systems    PAST MEDICAL HISTORY    has a past medical history of A-fib ([address] Anxiety, Arthritis, Blood transfusion reaction, CAD (coronary artery disease), Carotid stenosis, COPD (chronic obstructive pulmonary disease) ([address] GERD (gastroesophageal reflux disease), Hepatitis B, History of blood transfusion, Hyperlipidemia, Hypertension, Hypothyroid, Kidney disease, Legally blind, Macular edema, PVD (peripheral vascular disease) ([address] Sleep apnea, and Subclavian artery stenosis ([address] SURGICAL HISTORY      has a past surgical history that includes Aorto-femoral Bypass Graft (2001); tumor removal (Left); Tonsillectomy; Toe amputation (2001); Cataract removal (Bilateral); Cholecystectomy (05/05/2014); Carotid endarterectomy; Cardiac catheterization (10/2012); eye surgery; eye surgery; Carotid endarterectomy (Left, 05/16/2019); Carotid endarterectomy (Left, 5/16/2019); and vascular surgery (N/A, 9/23/2022).     CURRENTMEDICATIONS       Previous Medications    ALBUTEROL 2023  8:14 PM        CONTINUE these medications which have NOT CHANGED    Details   metoprolol tartrate (LOPRESSOR) 25 MG tablet Take 1 tablet by mouth 2 times daily, Disp-60 tablet, R-3Normal      ALPRAZolam (XANAX) 1 MG tablet Take 1 mg by mouth nightly as needed for Sleep. Historical Med      ferrous sulfate (IRON 325) 325 (65 Fe) MG tablet Take 325 mg by mouth dailyHistorical Med      rosuvastatin (CRESTOR) 10 MG tablet Take 10 mg by mouth dailyHistorical Med      Roflumilast (DALIRESP) 500 MCG tablet Take 500 mcg by mouth dailyHistorical Med      XARELTO 20 MG TABS tablet Take 20 mg by mouth daily, DAWHistorical Med      SYMBICORT 160-4.5 MCG/ACT AERO DAWHistorical Med      calcium carbonate 1500 (600 Ca) MG TABS tablet Historical Med      furosemide (LASIX) 40 MG tablet Take 20 mg by mouth dailyHistorical Med      pregabalin (LYRICA) 150 MG capsule Take 150 mg by mouth 2 times daily. Historical Med      alendronate (FOSAMAX) 35 MG tablet Take 35 mg by mouth every 7 daysHistorical Med      aspirin 81 MG EC tablet Take 81 mg by mouth dailyHistorical Med      fexofenadine (ALLEGRA) 180 MG tablet Take 180 mg by mouth dailyHistorical Med      levothyroxine (SYNTHROID) 50 MCG tablet Take 50 mcg by mouth Daily. Historical Med      omeprazole (PRILOSEC) 40 MG delayed release capsule Take 40 mg by mouth daily. Historical Med      albuterol (PROVENTIL HFA;VENTOLIN HFA) 108 (90 BASE) MCG/ACT inhaler Inhale 2 puffs into the lungs every 4 hours as needed for Wheezing. Historical Med      folic acid (FOLVITE) 1 MG tablet Take 1 mg by mouth daily. Historical Med      Cholecalciferol (VITAMIN D) 2000 UNITS TABS Take  by mouth. Historical Med             ALLERGIES     is allergic to iv contrast [iodides]. FAMILY HISTORY     She indicated that her mother is . She indicated that her father is . She indicated that two of her four sisters are . She indicated that only one of her four brothers is alive.  She she has been smoking. She has been smoking an average of 1 pack per day. She has quit using smokeless tobacco. She reports that she does not drink alcohol and does not use drugs. PHYSICAL EXAM    (up to 7 for level 4, 8 or more for level 5)   INITIAL VITALS:  height is 5' 3\" (1.6 m) and weight is 76.2 kg (168 lb). Her oral temperature is 98.1 °F (36.7 °C). Her blood pressure is 118/100 (abnormal) and her pulse is 124 (abnormal). Her respiration is 18 and oxygen saturation is 90%. Physical Exam  Vitals and nursing note reviewed. Constitutional:       Appearance: She is not ill-appearing. HENT:      Head: Normocephalic and atraumatic. Eyes:      Extraocular Movements: Extraocular movements intact. Pupils: Pupils are equal, round, and reactive to light. Cardiovascular:      Rate and Rhythm: Tachycardia present. Rhythm irregular. Heart sounds: Normal heart sounds. No murmur heard. No friction rub. No gallop. Pulmonary:      Effort: Pulmonary effort is normal.      Breath sounds: Normal breath sounds. No wheezing, rhonchi or rales. Abdominal:      General: Bowel sounds are normal.      Palpations: Abdomen is soft. Tenderness: There is no abdominal tenderness. There is no guarding. Comments: Contusion noted to right chest wall posteriorly, over the right flank as well. No abdominal tenderness on exam.    Musculoskeletal:         General: Normal range of motion. Cervical back: Normal range of motion and neck supple. No rigidity. Lymphadenopathy:      Cervical: No cervical adenopathy. Skin:     General: Skin is warm and dry. Capillary Refill: Capillary refill takes less than 2 seconds. Neurological:      Mental Status: She is alert. She is disoriented and confused. GCS: GCS eye subscore is 4. GCS verbal subscore is 4. Psychiatric:         Cognition and Memory: Cognition is impaired. DIFFERENTIAL DIAGNOSIS/ MDM:     ***    DIAGNOSTIC RESULTS     EKG:  All EKG's are interpreted by the Emergency Department Physician who either signs or Co-signs this chart in the absence of a cardiologist.    EKG Interpretation    Interpreted by emergency department physician    Rhythm: atrial fibrillation - rapid  Rate: tachycardia and 110-120  Axis: left  Ectopy: none  Conduction: normal  ST Segments: nonspecific changes  T Waves: non specific changes  Q Waves: III    Clinical Impression: atrial fibrillation (chronic)    David Montez MD      RADIOLOGY:        Interpretation per the Radiologist below, if available at the time of this note:    ***    LABS:  No results found for this visit on 02/17/23. ***    EMERGENCY DEPARTMENT COURSE:   Vitals:    Vitals:    02/17/23 1041   BP: (!) 118/100   Pulse: (!) 124   Resp: 18   Temp: 98.1 °F (36.7 °C)   TempSrc: Oral   SpO2: 90%   Weight: 76.2 kg (168 lb)   Height: 5' 3\" (1.6 m)     -------------------------  BP: (!) 118/100, Temp: 98.1 °F (36.7 °C), Heart Rate: (!) 124, Resp: 18    The patient was given the following medications:  No orders of the defined types were placed in this encounter. RE-EVALUATION:  ***    CONSULTS:  ***    PROCEDURES:  None    FINAL IMPRESSION    No diagnosis found. DISPOSITION/PLAN   DISPOSITION        CONDITION ON DISPOSITION:   ***    PATIENT REFERRED TO:  No follow-up provider specified. DISCHARGE MEDICATIONS:  New Prescriptions    No medications on file       (Please note that portions of this note were completed with a voicerecognition program.  Efforts were made to edit the dictations but occasionally words are mis-transcribed.)    David Montez MD,, MD, F.A.C.E.P.   Attending Emergency Medicine Physician 2/17/2023  EXAMINATION: CT OF THE HEAD WITHOUT CONTRAST; CT OF THE CERVICAL SPINE WITHOUT CONTRAST 2/17/2023 11:44 am TECHNIQUE: CT of the head was performed without the administration of intravenous contrast. Automated exposure control, iterative reconstruction, and/or weight based adjustment of the mA/kV was utilized to reduce the radiation dose to as low as reasonably achievable.; CT of the cervical spine was performed without the administration of intravenous contrast. Multiplanar reformatted images are provided for review. Automated exposure control, iterative reconstruction, and/or weight based adjustment of the mA/kV was utilized to reduce the radiation dose to as low as reasonably achievable. COMPARISON: CT head 04/24/2022 CTA neck 04/04/2019 HISTORY: ORDERING SYSTEM PROVIDED HISTORY: fall, on xarelto TECHNOLOGIST PROVIDED HISTORY: fall, on xarelto Decision Support Exception - unselect if not a suspected or confirmed emergency medical condition->Emergency Medical Condition (MA) Reason for Exam: fall, on blood thinners FINDINGS: HEAD: BRAIN/VENTRICLES: Ventricles normal in size and location. Basal cisterns normally outlined. There is periventricular white matter discrete and confluent low attenuation, nonspecific, likely representing age-related chronic small vessel ischemic disease. No acute infarct. No acute intracranial hemorrhage. ORBITS: The visualized portion of the orbits demonstrate no acute abnormality. SINUSES: The visualized paranasal sinuses and mastoid air cells demonstrate no acute abnormality. SOFT TISSUES/SKULL: No acute abnormality of the visualized skull or soft tissues. CERVICAL SPINE: BONES/ALIGNMENT: There is no evidence of an acute cervical spine fracture. No traumatic malalignment. DEGENERATIVE CHANGES: Mild degenerative changes. SOFT TISSUES: There is no prevertebral soft tissue swelling. CT HEAD: No CT evidence for acute intracranial abnormality. . Mild chronic small vessel ischemic changes in the periventricular white matter. CT CERVICAL SPINE: No acute fracture or subluxation of the cervical spine. Mild degenerative changes. CT CERVICAL SPINE WO CONTRAST    Result Date: 2/17/2023  EXAMINATION: CT OF THE HEAD WITHOUT CONTRAST; CT OF THE CERVICAL SPINE WITHOUT CONTRAST 2/17/2023 11:44 am TECHNIQUE: CT of the head was performed without the administration of intravenous contrast. Automated exposure control, iterative reconstruction, and/or weight based adjustment of the mA/kV was utilized to reduce the radiation dose to as low as reasonably achievable.; CT of the cervical spine was performed without the administration of intravenous contrast. Multiplanar reformatted images are provided for review. Automated exposure control, iterative reconstruction, and/or weight based adjustment of the mA/kV was utilized to reduce the radiation dose to as low as reasonably achievable. COMPARISON: CT head 04/24/2022 CTA neck 04/04/2019 HISTORY: ORDERING SYSTEM PROVIDED HISTORY: fall, on xarelto TECHNOLOGIST PROVIDED HISTORY: fall, on xarelto Decision Support Exception - unselect if not a suspected or confirmed emergency medical condition->Emergency Medical Condition (MA) Reason for Exam: fall, on blood thinners FINDINGS: HEAD: BRAIN/VENTRICLES: Ventricles normal in size and location. Basal cisterns normally outlined. There is periventricular white matter discrete and confluent low attenuation, nonspecific, likely representing age-related chronic small vessel ischemic disease. No acute infarct. No acute intracranial hemorrhage. ORBITS: The visualized portion of the orbits demonstrate no acute abnormality. SINUSES: The visualized paranasal sinuses and mastoid air cells demonstrate no acute abnormality. SOFT TISSUES/SKULL: No acute abnormality of the visualized skull or soft tissues. CERVICAL SPINE: BONES/ALIGNMENT: There is no evidence of an acute cervical spine fracture.  No traumatic malalignment. DEGENERATIVE CHANGES: Mild degenerative changes. SOFT TISSUES: There is no prevertebral soft tissue swelling. CT HEAD: No CT evidence for acute intracranial abnormality. . Mild chronic small vessel ischemic changes in the periventricular white matter. CT CERVICAL SPINE: No acute fracture or subluxation of the cervical spine. Mild degenerative changes. CT ABDOMEN PELVIS W IV CONTRAST Additional Contrast? None    Result Date: 2/17/2023  EXAMINATION: CT OF THE ABDOMEN AND PELVIS WITH CONTRAST 2/17/2023 2:10 pm TECHNIQUE: CT of the abdomen and pelvis was performed with the administration of intravenous contrast. Multiplanar reformatted images are provided for review. Automated exposure control, iterative reconstruction, and/or weight based adjustment of the mA/kV was utilized to reduce the radiation dose to as low as reasonably achievable. COMPARISON: April 17, 2014 MR abdomen HISTORY: ORDERING SYSTEM PROVIDED HISTORY: right flank contusion, abdominal distention, tachycardia, recent fall, on xarelto TECHNOLOGIST PROVIDED HISTORY: right flank contusion, abdominal distention, tachycardia, recent fall, on xarelto Decision Support Exception - unselect if not a suspected or confirmed emergency medical condition->Emergency Medical Condition (MA) Reason for Exam: AMS, elevated d-dimer, tachy, recent fall, right flank contusion FINDINGS: Lower Chest: Cardiomegaly and calcific coronary artery disease and possible aortic valve disease. Bibasilar airspace disease. Organs: The abdominal wall appears normal. The liver, spleen, pancreas, and adrenals appear normal.  Dilated intra and extrahepatic bile ducts. Gallbladder surgically absent. Multiple simple bilateral renal cortical cysts. 40 mm simple left renal cortical cyst.  No further follow-up required as a appears simple/benign. The bladder appears normal. GI/Bowel: The stomach,small bowel, and colon appear normal. Colonic diverticulosis.   Increased stool throughout the colon. Pelvis: Surgical clips left inguinal region. Peritoneum/Retroperitoneum: The abdominal aorta and iliac arteries are normal in caliber. There is no pathologic adenopathy. Calcified plaque along the aorta and its branches. Aorto bi-iliac stent appears grossly patent. Bones/Soft Tissues: Nondisplaced fracture of the 9th rib posteriorly on the right. Fracture 9th rib on the right. Incidental findings as above. XR CHEST PORTABLE    Result Date: 2/15/2023  EXAMINATION: ONE XRAY VIEW OF THE CHEST 2/15/2023 11:48 am COMPARISON: 02/13/2023 HISTORY: ORDERING SYSTEM PROVIDED HISTORY: Hypoxia TECHNOLOGIST PROVIDED HISTORY: Hypoxia Reason for Exam: Hypoxia FINDINGS: Cardial pericardial silhouette is enlarged but stable. The pulmonary vasculature is congested and there is increased interstitial opacity. Patchy bibasilar airspace disease is seen. Stent in the superior mediastinum is noted. No pneumothorax. No free air. Chronic proximal right humeral fracture noted. No acute bony abnormality. Pulmonary vascular gesture along with increased interstitial opacity. Please correlate with clinical evidence of interstitial edema. Bibasilar airspace disease, edema versus atelectasis versus pneumonia. CT CHEST PULMONARY EMBOLISM W CONTRAST    Result Date: 2/17/2023  EXAMINATION: CTA OF THE CHEST 2/17/2023 2:10 pm TECHNIQUE: CTA of the chest was performed after the administration of intravenous contrast.  Multiplanar reformatted images are provided for review. MIP images are provided for review. Automated exposure control, iterative reconstruction, and/or weight based adjustment of the mA/kV was utilized to reduce the radiation dose to as low as reasonably achievable. COMPARISON: None.  HISTORY: ORDERING SYSTEM PROVIDED HISTORY: r/o PE TECHNOLOGIST PROVIDED HISTORY: r/o PE Decision Support Exception - unselect if not a suspected or confirmed emergency medical condition->Emergency Medical Condition (MA) Reason for Exam: AMS, elevated d-dimer, tachy, recent fall, right flank contusion FINDINGS: Pulmonary Arteries: Pulmonary arteries are adequately opacified for evaluation. No evidence of intraluminal filling defect to suggest pulmonary embolism. Main pulmonary artery is normal in caliber. Mediastinum: No evidence of mediastinal lymphadenopathy. The heart and pericardium demonstrate no acute abnormality. There is no acute abnormality of the thoracic aorta. A stent at the origin of the right subclavian artery. Lungs/pleura: Focal consolidative changes are noted within bilateral lower lobes and right middle lobe and lingula. Small bilateral pleural effusion. No pneumothorax. Upper Abdomen: For detailed description please refer to the same-day abdominal CT report. Soft Tissues/Bones: Acute fracture of right 9 rib. No evidence of pulmonary embolism. Acute mildly displaced fracture of right 9 rib. Focal consolidative changes are noted within bilateral lower lobes and right middle lobe and lingula. Findings can be related to atelectasis. Superimposed pneumonia cannot be excluded. Small bilateral pleural effusion.          LABS:  Results for orders placed or performed during the hospital encounter of 02/17/23   CBC with Auto Differential   Result Value Ref Range    WBC 11.7 (H) 3.5 - 11.0 k/uL    RBC 3.46 (L) 4.0 - 5.2 m/uL    Hemoglobin 10.8 (L) 12.0 - 16.0 g/dL    Hematocrit 31.8 (L) 36 - 46 %    MCV 91.9 80 - 100 fL    MCH 31.2 26 - 34 pg    MCHC 34.0 31 - 37 g/dL    RDW 14.7 12.5 - 15.4 %    Platelets 435 933 - 974 k/uL    MPV 11.3 8.0 - 14.0 fL    Seg Neutrophils 88 (H) 36 - 66 %    Lymphocytes 6 (L) 24 - 44 %    Monocytes 6 2 - 11 %    Eosinophils % 0 (L) 1 - 4 %    Basophils 0 0 - 2 %    Segs Absolute 10.16 (H) 1.8 - 7.7 k/uL    Absolute Lymph # 0.75 (L) 1.0 - 4.8 k/uL    Absolute Mono # 0.73 0.1 - 1.2 k/uL    Absolute Eos # 0.03 0.0 - 0.4 k/uL    Basophils Absolute 0.00 0.0 - 0.2 k/uL Comprehensive Metabolic Panel   Result Value Ref Range    Glucose 130 (H) 70 - 99 mg/dL    BUN 20 8 - 23 mg/dL    Creatinine 1.16 (H) 0.50 - 0.90 mg/dL    Est, Glom Filt Rate 51 (L) >60 mL/min/1.73m2    Calcium 9.5 8.6 - 10.4 mg/dL    Sodium 133 (L) 135 - 144 mmol/L    Potassium 3.7 3.7 - 5.3 mmol/L    Chloride 92 (L) 98 - 107 mmol/L    CO2 28 20 - 31 mmol/L    Anion Gap 13 9 - 17 mmol/L    Alkaline Phosphatase 362 (H) 35 - 104 U/L    ALT 75 (H) 5 - 33 U/L     (H) <32 U/L    Total Bilirubin 2.2 (H) 0.3 - 1.2 mg/dL    Total Protein 7.0 6.4 - 8.3 g/dL    Albumin 3.9 3.5 - 5.2 g/dL    Albumin/Globulin Ratio 1.3 1.0 - 2.5   TOX SCR, BLD, ED   Result Value Ref Range    Acetaminophen Level <5 (L) 10 - 30 ug/mL    Ethanol <10 <10 mg/dL    Ethanol percent <3.693 <2.130 %    Salicylate Lvl <1 (L) 3 - 10 mg/dL    Toxic Tricyclic Sc,Blood NEGATIVE NEGATIVE   Urinalysis with Microscopic   Result Value Ref Range    Color, UA Yellow Yellow    Turbidity UA SLIGHTLY CLOUDY (A) Clear    Glucose, Ur NEGATIVE NEGATIVE    Bilirubin Urine NEGATIVE NEGATIVE    Ketones, Urine NEGATIVE NEGATIVE    Specific Gravity, UA 1.010 1.005 - 1.030    Urine Hgb NEGATIVE NEGATIVE    pH, UA 6.0 5.0 - 8.0    Protein, UA TRACE (A) NEGATIVE    Urobilinogen, Urine Normal Normal    Nitrite, Urine NEGATIVE NEGATIVE    Leukocyte Esterase, Urine NEGATIVE NEGATIVE    WBC, UA 0 TO 2 0 - 5 /HPF    RBC, UA 0 TO 2 0 - 2 /HPF    Epithelial Cells UA 5 TO 10 0 - 5 /HPF    Bacteria, UA FEW (A) None   Troponin   Result Value Ref Range    Troponin, High Sensitivity 66 (HH) 0 - 14 ng/L   D-Dimer, Quantitative   Result Value Ref Range    D-Dimer, Quant 3.86 mg/L FEU   Troponin   Result Value Ref Range    Troponin, High Sensitivity 100 (HH) 0 - 14 ng/L       Elevated troponin    EMERGENCY DEPARTMENT COURSE:   Vitals:    Vitals:    02/17/23 1041 02/17/23 1433 02/17/23 1512   BP: (!) 118/100 121/72 (!) 125/56   Pulse: (!) 124 (!) 102 (!) 103   Resp: 18 21 17   Temp: 98.1 °F (36.7 °C)     TempSrc: Oral     SpO2: 90% 94% 97%   Weight: 76.2 kg (168 lb)     Height: 5' 3\" (1.6 m)       -------------------------  BP: (!) 125/56, Temp: 98.1 °F (36.7 °C), Heart Rate: (!) 103, Resp: 17    The patient was given the following medications:  Orders Placed This Encounter   Medications    DISCONTD: dilTIAZem injection 10 mg    DISCONTD: dilTIAZem 125 mg in sodium chloride 0.9 % 125 mL infusion     Order Specific Question:   Titrate Infusion? Answer:   Yes     Order Specific Question:   Initial Infusion Dose: Answer:   5 mg/hr     Order Specific Question:   Goal of Therapy is: Answer:   HR less than 100 bpm     Order Specific Question:   Contact Provider if:     Answer:   SBP less than 90 mmHg     Order Specific Question:   Contact Provider if:     Answer:   HR less than 60 bpm     Order Specific Question:   HOLD for     Answer:   SBP less than 90 mmHg     Order Specific Question:   HOLD for     Answer:   HR less than 60 bpm    DISCONTD: 0.9 % sodium chloride bolus    DISCONTD: sodium chloride flush 0.9 % injection 10 mL    iopamidol (ISOVUE-370) 76 % injection 100 mL    DISCONTD: lidocaine PF 1 % injection 5 mL    DISCONTD: sodium chloride flush 0.9 % injection 10 mL    DISCONTD: sodium chloride flush 0.9 % injection 10 mL    DISCONTD: 0.9 % sodium chloride infusion            CONSULTS:  Internal medicine    PROCEDURES:  None    FINAL IMPRESSION      1. Frequent falls    2.  Closed fracture of one rib of right side, initial encounter          DISPOSITION/PLAN   DISPOSITION Decision To Discharge 02/17/2023 08:12:17 PM      CONDITION ON DISPOSITION:   Stable     PATIENT REFERRED TO:  Tate Alejandro MD  [address]  [phone]  [phone]    Schedule an appointment as soon as possible for a visit       DISCHARGE MEDICATIONS:  Discharge Medication List as of 2/17/2023  8:14 PM          (Please note that portions of this note were completed with a voicerecognition program.  Efforts were made to edit the dictations but occasionally words are mis-transcribed.)    Yan Villareal MD  Attending Emergency Medicine Physician        Yan Villareal MD  02/21/23 7423

## 2023-02-18 LAB
EKG ATRIAL RATE: 202 BPM
EKG Q-T INTERVAL: 318 MS
EKG QRS DURATION: 84 MS
EKG QTC CALCULATION (BAZETT): 447 MS
EKG R AXIS: -10 DEGREES
EKG T AXIS: 31 DEGREES
EKG VENTRICULAR RATE: 119 BPM

## 2023-02-18 NOTE — DISCHARGE INSTRUCTIONS
We did offer admission for placement to nursing home facility, however you declined at this time despite discussing risks and benefits. Please follow-up with primary care provider as soon as possible to discuss home health care. Please take precautions to prevent falls at home. If you change your mind or you develop any concerns do not hesitate to return to the emergency department.

## 2023-03-15 PROBLEM — R79.89 ELEVATED TROPONIN: Status: RESOLVED | Noted: 2023-02-13 | Resolved: 2023-03-15

## 2023-03-15 PROBLEM — R77.8 ELEVATED TROPONIN: Status: RESOLVED | Noted: 2023-02-13 | Resolved: 2023-03-15

## 2023-03-22 ENCOUNTER — OFFICE VISIT (OUTPATIENT)
Age: 73
End: 2023-03-22
Payer: MEDICARE

## 2023-03-22 VITALS
OXYGEN SATURATION: 98 % | DIASTOLIC BLOOD PRESSURE: 73 MMHG | SYSTOLIC BLOOD PRESSURE: 128 MMHG | TEMPERATURE: 97 F | HEART RATE: 81 BPM | BODY MASS INDEX: 28.7 KG/M2 | RESPIRATION RATE: 18 BRPM | HEIGHT: 63 IN | WEIGHT: 162 LBS

## 2023-03-22 DIAGNOSIS — I65.23 BILATERAL CAROTID ARTERY STENOSIS: ICD-10-CM

## 2023-03-22 DIAGNOSIS — Z98.890 S/P CAROTID ENDARTERECTOMY: Primary | ICD-10-CM

## 2023-03-22 PROCEDURE — 4004F PT TOBACCO SCREEN RCVD TLK: CPT | Performed by: SURGERY

## 2023-03-22 PROCEDURE — 1090F PRES/ABSN URINE INCON ASSESS: CPT | Performed by: SURGERY

## 2023-03-22 PROCEDURE — 99213 OFFICE O/P EST LOW 20 MIN: CPT | Performed by: SURGERY

## 2023-03-22 PROCEDURE — G8400 PT W/DXA NO RESULTS DOC: HCPCS | Performed by: SURGERY

## 2023-03-22 PROCEDURE — G8427 DOCREV CUR MEDS BY ELIG CLIN: HCPCS | Performed by: SURGERY

## 2023-03-22 PROCEDURE — G8484 FLU IMMUNIZE NO ADMIN: HCPCS | Performed by: SURGERY

## 2023-03-22 PROCEDURE — 3017F COLORECTAL CA SCREEN DOC REV: CPT | Performed by: SURGERY

## 2023-03-22 PROCEDURE — 1123F ACP DISCUSS/DSCN MKR DOCD: CPT | Performed by: SURGERY

## 2023-03-22 PROCEDURE — G8417 CALC BMI ABV UP PARAM F/U: HCPCS | Performed by: SURGERY

## 2023-03-22 RX ORDER — BENZTROPINE MESYLATE 0.5 MG/1
0.5 TABLET ORAL 2 TIMES DAILY
COMMUNITY

## 2023-03-22 NOTE — PROGRESS NOTES
[address]  1950  81 y. o.female       Chief Complaint:No chief complaint on file. History of present Illness:  Pt is here today for evaluation and treatment of carotid stenosis. She has a history of left carotid endarterectomy and is here for her annual surveillance. I reviewed her recent carotid duplex from about a month ago which shows less than 50% stenosis bilaterally. It has now been 5 years since her carotid endarterectomy and I think we can check on a 2-year interval based on a completely stable postoperative findings. Recently she was admitted for confusion and a fall during a sleep study. She still does not know if she had a stroke in but she has no permanent deficits from what I can see. All of her imaging which was done at the time during a 3-day hospital admission came back negative. She also has a history of peripheral arterial disease and underwent thrombectomy in 2022 she has noninvasive vascular studies recently but she has not signed her release form. We will call her primary care physician to see if we can obtain those records. Past Medical History:  has a past medical history of A-fib ([address] Anxiety, Arthritis, Blood transfusion reaction, CAD (coronary artery disease), Carotid stenosis, COPD (chronic obstructive pulmonary disease) ([address] GERD (gastroesophageal reflux disease), Hepatitis B, History of blood transfusion, Hyperlipidemia, Hypertension, Hypothyroid, Kidney disease, Legally blind, Macular edema, PVD (peripheral vascular disease) ([address] Sleep apnea, and Subclavian artery stenosis ([address]     Past Surgical History:   Past Surgical History:   Procedure Laterality Date    AORTO-FEMORAL BYPASS GRAFT  2001    CARDIAC CATHETERIZATION  10/2012    no stents    CAROTID ENDARTERECTOMY      CAROTID

## 2023-05-12 ENCOUNTER — HOSPITAL ENCOUNTER (OUTPATIENT)
Age: 73
Discharge: HOME | End: 2023-05-12
Payer: MEDICARE

## 2023-05-12 LAB
ABSOLUTE EOS #: 0.02 K/UL (ref 0–0.4)
ABSOLUTE IMMATURE GRANULOCYTE: 0.02 K/UL (ref 0–0.3)
ABSOLUTE LYMPH #: 1.28 K/UL (ref 1–4.8)
ABSOLUTE MONO #: 0.23 K/UL (ref 0.1–0.8)
BASOPHILS # BLD: 0 % (ref 0–2)
BASOPHILS ABSOLUTE: 0 K/UL (ref 0–0.2)
BILIRUBIN URINE: NEGATIVE
CASTS UA: ABNORMAL /LPF (ref 0–2)
CASTS UA: ABNORMAL /LPF (ref 0–2)
COLOR: YELLOW
CRYSTALS, UA: ABNORMAL /HPF
CRYSTALS, UA: ABNORMAL /HPF
EOSINOPHILS RELATIVE PERCENT: 1 % (ref 1–4)
EPITHELIAL CELLS UA: ABNORMAL /HPF (ref 0–5)
GLUCOSE UR STRIP.AUTO-MCNC: NEGATIVE MG/DL
HCT VFR BLD AUTO: 39.3 % (ref 36.3–47.1)
HGB BLD-MCNC: 12.9 G/DL (ref 11.9–15.1)
IMMATURE GRANULOCYTES: 1 %
KETONES UR STRIP.AUTO-MCNC: NEGATIVE MG/DL
LEUKOCYTE ESTERASE UR QL STRIP.AUTO: NEGATIVE
LYMPHOCYTES # BLD: 61 % (ref 24–44)
MCH RBC QN AUTO: 30 PG (ref 25.2–33.5)
MCHC RBC AUTO-ENTMCNC: 32.8 G/DL (ref 28.4–34.8)
MCV RBC AUTO: 91.4 FL (ref 82.6–102.9)
MONOCYTES # BLD: 11 % (ref 1–7)
MORPHOLOGY: NORMAL
MUCUS: ABNORMAL
NITRITE UR QL STRIP.AUTO: NEGATIVE
NRBC AUTOMATED: 0 PER 100 WBC
PDW BLD-RTO: 13.4 % (ref 11.8–14.4)
PLATELET # BLD AUTO: 272 K/UL (ref 138–453)
PMV BLD AUTO: 11.5 FL (ref 8.1–13.5)
PROT UR STRIP.AUTO-MCNC: 6 MG/DL (ref 5–8)
PROT UR STRIP.AUTO-MCNC: NEGATIVE MG/DL
PTH-INTACT SERPL-MCNC: 26.8 PG/ML (ref 14–72)
RBC # BLD: 4.3 M/UL (ref 3.95–5.11)
RBC CLUMPS #/AREA URNS AUTO: ABNORMAL /HPF (ref 0–2)
SEG NEUTROPHILS: 26 % (ref 36–66)
SEGMENTED NEUTROPHILS ABSOLUTE COUNT: 0.55 K/UL (ref 1.8–7.7)
SPECIFIC GRAVITY UA: 1.02 (ref 1–1.03)
TURBIDITY: ABNORMAL
URINE HGB: NEGATIVE
UROBILINOGEN, URINE: NORMAL
WBC # BLD AUTO: 2.1 K/UL (ref 3.5–11.3)
WBC UA: ABNORMAL /HPF (ref 0–5)

## 2023-05-12 PROCEDURE — 83970 ASSAY OF PARATHORMONE: CPT

## 2023-05-12 PROCEDURE — 80053 COMPREHEN METABOLIC PANEL: CPT

## 2023-05-12 PROCEDURE — 85025 COMPLETE CBC W/AUTO DIFF WBC: CPT

## 2023-05-12 PROCEDURE — 81001 URINALYSIS AUTO W/SCOPE: CPT

## 2023-05-12 PROCEDURE — 82306 VITAMIN D 25 HYDROXY: CPT

## 2023-05-12 PROCEDURE — 36415 COLL VENOUS BLD VENIPUNCTURE: CPT

## 2023-05-13 LAB
25(OH)D3 SERPL-MCNC: 66.2 NG/ML
ALBUMIN SERPL-MCNC: 3.7 G/DL (ref 3.5–5.2)
ALBUMIN/GLOBULIN RATIO: 1.1 (ref 1–2.5)
ALP SERPL-CCNC: 69 U/L (ref 35–104)
ALT SERPL-CCNC: 8 U/L (ref 5–33)
ANION GAP SERPL CALCULATED.3IONS-SCNC: 9 MMOL/L (ref 9–17)
AST SERPL-CCNC: 14 U/L
BILIRUB SERPL-MCNC: 0.4 MG/DL (ref 0.3–1.2)
BUN SERPL-MCNC: 20 MG/DL (ref 8–23)
CALCIUM SERPL-MCNC: 10 MG/DL (ref 8.6–10.4)
CHLORIDE SERPL-SCNC: 101 MMOL/L (ref 98–107)
CO2 SERPL-SCNC: 27 MMOL/L (ref 20–31)
CREAT SERPL-MCNC: 1.18 MG/DL (ref 0.5–0.9)
GFR SERPL CREATININE-BSD FRML MDRD: 50 ML/MIN/1.73M2
GLUCOSE SERPL-MCNC: 135 MG/DL (ref 70–99)
POTASSIUM SERPL-SCNC: 4.7 MMOL/L (ref 3.7–5.3)
PROT SERPL-MCNC: 7 G/DL (ref 6.4–8.3)
SODIUM SERPL-SCNC: 137 MMOL/L (ref 135–144)

## 2023-05-15 ENCOUNTER — HOSPITAL ENCOUNTER (EMERGENCY)
Age: 73
Discharge: HOME | End: 2023-05-15
Attending: EMERGENCY MEDICINE
Payer: MEDICARE

## 2023-05-15 ENCOUNTER — APPOINTMENT (OUTPATIENT)
Dept: GENERAL RADIOLOGY | Age: 73
End: 2023-05-15
Payer: MEDICARE

## 2023-05-15 VITALS
HEART RATE: 90 BPM | DIASTOLIC BLOOD PRESSURE: 81 MMHG | OXYGEN SATURATION: 99 % | SYSTOLIC BLOOD PRESSURE: 149 MMHG | WEIGHT: 170 LBS | HEIGHT: 63 IN | TEMPERATURE: 97.5 F | BODY MASS INDEX: 30.12 KG/M2 | RESPIRATION RATE: 16 BRPM

## 2023-05-15 DIAGNOSIS — I10 PRIMARY HYPERTENSION: Primary | ICD-10-CM

## 2023-05-15 DIAGNOSIS — T43.615A ADVERSE EFFECT OF CAFFEINE, INITIAL ENCOUNTER: ICD-10-CM

## 2023-05-15 LAB
ALBUMIN SERPL-MCNC: 3.5 G/DL (ref 3.5–5.2)
ALBUMIN/GLOB SERPL: 1.2 {RATIO} (ref 1–2.5)
ALP SERPL-CCNC: 76 U/L (ref 35–104)
ALT SERPL-CCNC: <5 U/L (ref 5–33)
ANION GAP SERPL CALCULATED.3IONS-SCNC: 10 MMOL/L (ref 9–17)
AST SERPL-CCNC: 8 U/L
BASOPHILS # BLD: 0 % (ref 0–2)
BASOPHILS # BLD: 0 K/UL (ref 0–0.2)
BILIRUB SERPL-MCNC: 0.6 MG/DL (ref 0.3–1.2)
BUN SERPL-MCNC: 23 MG/DL (ref 8–23)
CALCIUM SERPL-MCNC: 9.4 MG/DL (ref 8.6–10.4)
CHLORIDE SERPL-SCNC: 103 MMOL/L (ref 98–107)
CO2 SERPL-SCNC: 26 MMOL/L (ref 20–31)
CREAT SERPL-MCNC: 0.95 MG/DL (ref 0.5–0.9)
EOSINOPHIL # BLD: 0.1 K/UL (ref 0–0.4)
EOSINOPHILS RELATIVE PERCENT: 2 % (ref 1–4)
ERYTHROCYTE [DISTWIDTH] IN BLOOD BY AUTOMATED COUNT: 14.6 % (ref 12.5–15.4)
GFR SERPL CREATININE-BSD FRML MDRD: >60 ML/MIN/1.73M2
GLUCOSE SERPL-MCNC: 103 MG/DL (ref 70–99)
HCT VFR BLD AUTO: 36 % (ref 36–46)
HGB BLD-MCNC: 12.2 G/DL (ref 12–16)
LYMPHOCYTES # BLD: 32 % (ref 24–44)
LYMPHOCYTES NFR BLD: 1.5 K/UL (ref 1–4.8)
MCH RBC QN AUTO: 30.5 PG (ref 26–34)
MCHC RBC AUTO-ENTMCNC: 34 G/DL (ref 31–37)
MCV RBC AUTO: 89.9 FL (ref 80–100)
MONOCYTES NFR BLD: 0.2 K/UL (ref 0.1–1.2)
MONOCYTES NFR BLD: 4 % (ref 2–11)
NEUTROPHILS NFR BLD: 62 % (ref 36–66)
NEUTS SEG NFR BLD: 2.9 K/UL (ref 1.8–7.7)
PLATELET # BLD AUTO: 209 K/UL (ref 140–450)
PMV BLD AUTO: 8.8 FL (ref 6–12)
POTASSIUM SERPL-SCNC: 4.5 MMOL/L (ref 3.7–5.3)
PROT SERPL-MCNC: 6.4 G/DL (ref 6.4–8.3)
RBC # BLD AUTO: 4 M/UL (ref 4–5.2)
SODIUM SERPL-SCNC: 139 MMOL/L (ref 135–144)
TROPONIN I SERPL DL<=0.01 NG/ML-MCNC: 25 NG/L (ref 0–14)
TROPONIN I SERPL DL<=0.01 NG/ML-MCNC: 30 NG/L (ref 0–14)
WBC OTHER # BLD: 4.6 K/UL (ref 3.5–11)

## 2023-05-15 PROCEDURE — 71045 X-RAY EXAM CHEST 1 VIEW: CPT

## 2023-05-15 PROCEDURE — 80053 COMPREHEN METABOLIC PANEL: CPT

## 2023-05-15 PROCEDURE — 2580000003 HC RX 258: Performed by: EMERGENCY MEDICINE

## 2023-05-15 PROCEDURE — 36415 COLL VENOUS BLD VENIPUNCTURE: CPT

## 2023-05-15 PROCEDURE — 93005 ELECTROCARDIOGRAM TRACING: CPT | Performed by: EMERGENCY MEDICINE

## 2023-05-15 PROCEDURE — 99285 EMERGENCY DEPT VISIT HI MDM: CPT

## 2023-05-15 PROCEDURE — 85025 COMPLETE CBC W/AUTO DIFF WBC: CPT

## 2023-05-15 PROCEDURE — 84484 ASSAY OF TROPONIN QUANT: CPT

## 2023-05-15 RX ORDER — 0.9 % SODIUM CHLORIDE 0.9 %
1000 INTRAVENOUS SOLUTION INTRAVENOUS ONCE
Status: COMPLETED | OUTPATIENT
Start: 2023-05-15 | End: 2023-05-15

## 2023-05-15 RX ADMIN — SODIUM CHLORIDE 1000 ML: 9 INJECTION, SOLUTION INTRAVENOUS at 17:53

## 2023-05-15 ASSESSMENT — PAIN - FUNCTIONAL ASSESSMENT: PAIN_FUNCTIONAL_ASSESSMENT: NONE - DENIES PAIN

## 2023-05-15 ASSESSMENT — PAIN SCALES - GENERAL: PAINLEVEL_OUTOF10: 0

## 2023-05-16 NOTE — DISCHARGE INSTRUCTIONS
PLEASE RETURN TO THE EMERGENCY DEPARTMENT IMMEDIATELY if your symptoms worsen in anyway or in 8-12 hours if not improved for re-evaluation. You should immediately return to the ER for symptoms such as increasing pain, shortness of breath, fever, a feeling of passing out, light headed, dizziness, abdominal pain, numbness or weakness to the arms or legs, coolness or color change of the arms or legs. Take your medication as indicated and prescribed. If you are given an antibiotic then, make sure you get the prescription filled and take the antibiotics until finished. Please understand that at this time there is no evidence for a more serious underlying process, but that early in the process of an illness or injury, an emergency department workup can be falsely reassuring. You should contact your family doctor within the next 24 hours for a follow up appointment    David Goyal!!!    From [facility] ([facility]) and [facility] Emergency Services    On behalf of the Emergency Department staff at [facility]), I would like to thank you for giving us the opportunity to address your health care needs and concerns. We hope that during your visit, our service was delivered in a professional and caring manner. Please keep [facility] ([facility]) in mind as we walk with you down the path to your own personal wellness. Please expect an automated text message or email from us so we can ask a few questions about your health and progress. Based on your answers, a clinician may call you back to offer help and instructions. Please understand that early in the process of an illness or injury, an emergency department workup can be falsely reassuring. If you notice any worsening, changing or persistent symptoms please call your family doctor or return to the ER immediately. Tell us how we did during your visit at [email or web address]   and let us know about your experience

## 2023-05-16 NOTE — ED NOTES
Patient provided with discharge instructions, prescriptions, and follow up information. Verbalized understanding. IV discontinued and dry dressing in place. A&OX3. Wheelchair provided for discharge.        Kathleen Alejandro RN  05/15/23 4003

## 2023-05-16 NOTE — ED PROVIDER NOTES
Pulmonary effort is normal. No respiratory distress. Breath sounds: Normal breath sounds. Abdominal:      Palpations: Abdomen is soft. Tenderness: There is no abdominal tenderness. Musculoskeletal:         General: No tenderness. Cervical back: Neck supple. Skin:     Coloration: Skin is jaundiced and pale. Findings: Rash present. Neurological:      General: No focal deficit present. Mental Status: She is alert and oriented to person, place, and time. DIAGNOSTIC RESULTS     EKG: All EKG's are interpreted by the Emergency Department Physician who either signs orCo-signs this chart in the absence of a cardiologist.    Atrial fib with a rate of 82 bpm. No pattern of acute injury. RADIOLOGY:     Interpretation per the Radiologist below, ifavailable at the time of this note:    No orders to display         ED BEDSIDE ULTRASOUND:   Performed by ED Physician - none    LABS:  Labs Reviewed - No data to display    All other labs were within normal range ornot returned as of this dictation. EMERGENCY DEPARTMENT COURSE and DIFFERENTIAL DIAGNOSIS/MDM:   Vitals:    Vitals:    05/15/23 1702   BP: (!) 149/81   Pulse: 90   Resp: 16   Temp: 97.5 °F (36.4 °C)   TempSrc: Oral   SpO2: 99%   Weight: 77.1 kg (170 lb)   Height: 5' 3\" (1.6 m)            Patient's first troponin level is 30. A second level is ordered and is awaited. The rest of her blood work is unremarkable. Care is transferred to Dr. Nely Arredondo at change of shift for further evaluation and eventual disposition. CONSULTS:  None    PROCEDURES:  Unlessotherwise noted below, none     Procedures    FINAL IMPRESSION    No diagnosis found. DISPOSITION/PLAN   DISPOSITION        PATIENT REFERRED TO:  No follow-up provider specified.     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
has a hypertensive Blood pressure recorded of greater than 140/90. This blood pressure was repeated. There were no signs or symptoms of organ disease. Discharge instructions were given for hypertension because there were 2 blood pressure readings of systolic higher than 880 or diastolic higher than 90. The patient was instructed to return to the Emergency Department for the development of headache, chest pain, shortness of breath, abdominal pain, vomiting, numbness, weakness, visual or hearing changes. The patient was further instructed to follow up with their primary care provider for a recheck of their blood pressure. The patient understands that at this time there is no evidence for a more malignant underlying process, but the patient also understands that early in the process of an illness or injury, an emergency department workup can be falsely reassuring. Routine discharge counseling was given, and the patient understands that worsening, changing or persistent symptoms should prompt an immediate call or follow up with their primary physician or return to the emergency department. The importance of appropriate follow up was also discussed. I have reviewed the disposition diagnosis with the patient and or their family/guardian. I have answered their questions and given discharge instructions. They voiced understanding of these instructions and did not have any further questions or complaints. CONSULTS:    None    CRITICAL CARE:     None    PROCEDURES:    None    FINAL IMPRESSION      1. Primary hypertension    2.  Adverse effect of caffeine, initial encounter          DISPOSITION/PLAN   DISPOSITION Decision To Discharge 05/15/2023 09:22:35 PM      Condition on Disposition    Improved    PATIENT REFERRED TO:  Maria Isabel Cheung MD  [address]  [phone]  [phone]    Go in 1 day        DISCHARGE MEDICATIONS:  Current Discharge Medication List          (Please note

## 2023-05-17 LAB
EKG ATRIAL RATE: 375 BPM
EKG Q-T INTERVAL: 380 MS
EKG QRS DURATION: 80 MS
EKG QTC CALCULATION (BAZETT): 443 MS
EKG R AXIS: -16 DEGREES
EKG T AXIS: 10 DEGREES
EKG VENTRICULAR RATE: 82 BPM

## 2023-05-26 ENCOUNTER — HOSPITAL ENCOUNTER (OUTPATIENT)
Dept: VASCULAR LAB | Age: 73
Discharge: HOME | End: 2023-05-26

## 2023-05-26 DIAGNOSIS — Z98.890 S/P CAROTID ENDARTERECTOMY: ICD-10-CM

## 2023-05-26 DIAGNOSIS — I65.23 BILATERAL CAROTID ARTERY STENOSIS: ICD-10-CM

## 2023-06-05 ENCOUNTER — OFFICE VISIT (OUTPATIENT)
Dept: VASCULAR SURGERY | Age: 73
End: 2023-06-05
Payer: MEDICARE

## 2023-06-05 ENCOUNTER — HOSPITAL ENCOUNTER (EMERGENCY)
Age: 73
Discharge: HOME | End: 2023-06-05
Attending: EMERGENCY MEDICINE
Payer: MEDICARE

## 2023-06-05 VITALS
DIASTOLIC BLOOD PRESSURE: 63 MMHG | OXYGEN SATURATION: 96 % | HEIGHT: 63 IN | SYSTOLIC BLOOD PRESSURE: 134 MMHG | RESPIRATION RATE: 18 BRPM | HEART RATE: 93 BPM | WEIGHT: 170 LBS | BODY MASS INDEX: 30.12 KG/M2

## 2023-06-05 VITALS
OXYGEN SATURATION: 97 % | RESPIRATION RATE: 16 BRPM | TEMPERATURE: 98 F | HEART RATE: 92 BPM | SYSTOLIC BLOOD PRESSURE: 122 MMHG | DIASTOLIC BLOOD PRESSURE: 62 MMHG

## 2023-06-05 DIAGNOSIS — I73.9 PERIPHERAL ARTERIAL DISEASE (HCC): ICD-10-CM

## 2023-06-05 DIAGNOSIS — I65.23 BILATERAL CAROTID ARTERY STENOSIS: Primary | ICD-10-CM

## 2023-06-05 DIAGNOSIS — N39.0 URINARY TRACT INFECTION WITHOUT HEMATURIA, SITE UNSPECIFIED: Primary | ICD-10-CM

## 2023-06-05 LAB
BACTERIA URNS QL MICRO: ABNORMAL
BILIRUB UR QL STRIP: NEGATIVE
CHARACTER UR: ABNORMAL
CLARITY UR: ABNORMAL
COLOR UR: YELLOW
EPI CELLS #/AREA URNS HPF: ABNORMAL /HPF (ref 0–5)
GLUCOSE UR STRIP-MCNC: NEGATIVE MG/DL
HGB UR QL STRIP.AUTO: ABNORMAL
KETONES UR STRIP-MCNC: NEGATIVE MG/DL
LEUKOCYTE ESTERASE UR QL STRIP: ABNORMAL
NITRITE UR QL STRIP: NEGATIVE
PH UR STRIP: 6 [PH] (ref 5–8)
PROT UR STRIP-MCNC: NEGATIVE MG/DL
RBC #/AREA URNS HPF: ABNORMAL /HPF (ref 0–2)
SP GR UR STRIP: 1.01 (ref 1–1.03)
UROBILINOGEN UR STRIP-ACNC: NORMAL
WBC #/AREA URNS HPF: ABNORMAL /HPF (ref 0–5)

## 2023-06-05 PROCEDURE — 87077 CULTURE AEROBIC IDENTIFY: CPT

## 2023-06-05 PROCEDURE — 4004F PT TOBACCO SCREEN RCVD TLK: CPT | Performed by: SURGERY

## 2023-06-05 PROCEDURE — 87186 SC STD MICRODIL/AGAR DIL: CPT

## 2023-06-05 PROCEDURE — 99213 OFFICE O/P EST LOW 20 MIN: CPT | Performed by: SURGERY

## 2023-06-05 PROCEDURE — 87086 URINE CULTURE/COLONY COUNT: CPT

## 2023-06-05 PROCEDURE — 81001 URINALYSIS AUTO W/SCOPE: CPT

## 2023-06-05 PROCEDURE — 99283 EMERGENCY DEPT VISIT LOW MDM: CPT

## 2023-06-05 PROCEDURE — 6370000000 HC RX 637 (ALT 250 FOR IP): Performed by: NURSE PRACTITIONER

## 2023-06-05 PROCEDURE — 1090F PRES/ABSN URINE INCON ASSESS: CPT | Performed by: SURGERY

## 2023-06-05 PROCEDURE — 3017F COLORECTAL CA SCREEN DOC REV: CPT | Performed by: SURGERY

## 2023-06-05 PROCEDURE — G8427 DOCREV CUR MEDS BY ELIG CLIN: HCPCS | Performed by: SURGERY

## 2023-06-05 PROCEDURE — G8400 PT W/DXA NO RESULTS DOC: HCPCS | Performed by: SURGERY

## 2023-06-05 PROCEDURE — G8417 CALC BMI ABV UP PARAM F/U: HCPCS | Performed by: SURGERY

## 2023-06-05 PROCEDURE — 1123F ACP DISCUSS/DSCN MKR DOCD: CPT | Performed by: SURGERY

## 2023-06-05 RX ORDER — CEPHALEXIN 500 MG/1
500 CAPSULE ORAL 2 TIMES DAILY
Qty: 14 CAPSULE | Refills: 0 | Status: SHIPPED | OUTPATIENT
Start: 2023-06-05 | End: 2023-06-12

## 2023-06-05 RX ORDER — PHENAZOPYRIDINE HYDROCHLORIDE 100 MG/1
200 TABLET, FILM COATED ORAL ONCE
Status: COMPLETED | OUTPATIENT
Start: 2023-06-05 | End: 2023-06-05

## 2023-06-05 RX ORDER — CEPHALEXIN 250 MG/1
500 CAPSULE ORAL ONCE
Status: COMPLETED | OUTPATIENT
Start: 2023-06-05 | End: 2023-06-05

## 2023-06-05 RX ORDER — PHENAZOPYRIDINE HYDROCHLORIDE 100 MG/1
100 TABLET, FILM COATED ORAL 3 TIMES DAILY PRN
Qty: 6 TABLET | Refills: 0 | Status: SHIPPED | OUTPATIENT
Start: 2023-06-05 | End: 2023-06-07

## 2023-06-05 RX ADMIN — CEPHALEXIN 500 MG: 250 CAPSULE ORAL at 13:22

## 2023-06-05 RX ADMIN — PHENAZOPYRIDINE HYDROCHLORIDE 200 MG: 100 TABLET ORAL at 13:23

## 2023-06-05 NOTE — PROGRESS NOTES
hepatomegaly, splenomegaly or aortic enlargement  Neurological - normal speech, no focal findings or movement disorder noted, cranial nerves II through XII grossly intact  Extremities - peripheral pulses palpable, no pedal edema or calf pain with palpation  Skin - no gross lesions, rashes, or induration noted      Labs:   Lab Results   Component Value Date/Time    WBC 4.6 05/15/2023 05:50 PM    HGB 12.2 05/15/2023 05:50 PM    HCT 36.0 05/15/2023 05:50 PM    MCV 89.9 05/15/2023 05:50 PM     05/15/2023 05:50 PM     Lab Results   Component Value Date/Time     05/15/2023 05:50 PM    K 4.5 05/15/2023 05:50 PM     05/15/2023 05:50 PM    CO2 26 05/15/2023 05:50 PM    BUN 23 05/15/2023 05:50 PM    CREATININE 0.95 05/15/2023 05:50 PM    GLUCOSE 103 05/15/2023 05:50 PM    CALCIUM 9.4 05/15/2023 05:50 PM     Lab Results   Component Value Date/Time    ALKPHOS 76 05/15/2023 05:50 PM    ALT <5 05/15/2023 05:50 PM    AST 8 05/15/2023 05:50 PM    PROT 6.4 05/15/2023 05:50 PM    BILITOT 0.6 05/15/2023 05:50 PM    BILIDIR 0.12 04/30/2019 03:59 PM    LABALBU 3.5 05/15/2023 05:50 PM     Lab Results   Component Value Date/Time    LACTA 1.0 07/08/2022 10:17 PM     No results found for: AMYLASE  No results found for: LIPASE  Lab Results   Component Value Date/Time    INR 1.2 09/23/2022 12:04 PM         Assessment:  79-year-old female with history of left carotid endarterectomy    1. Bilateral carotid artery stenosis        Plan:   She is scheduled to see me back in 2 years with a carotid duplex. I have no concerns about peripheral arterial disease in reviewing her latest studies and her physical exam.  Also the patient has no complaints of claudication or rest pain and there are no ulcerations on her feet. No orders of the defined types were placed in this encounter.           Electronically signed by Morteza Coates MD on 6/5/2023 at 11:28 AM     Copy sent to Dr. Guero Palmer MD

## 2023-06-05 NOTE — DISCHARGE INSTRUCTIONS
PLEASE RETURN TO THE EMERGENCY DEPARTMENT IMMEDIATELY if your symptoms worsen in anyway or in 1-2 days if not improved for re-evaluation. You should immediately return to the ER for symptoms such as abdominal or back pain, fever, a feeling of passing out, light headed, dizziness, chest pain, shortness of breath, persistent nausea and/or vomiting, numbness or weakness to the arms or legs, coolness or color change of the arms or legs. Take your medication as indicated and prescribed. If you are given an antibiotic then, make sure you get the prescription filled and take the antibiotics until finished. You should encourage fluids. Please understand that at this time there is no evidence for a more serious underlying process, but that early in the process of an illness or injury, an emergency department workup can be falsely reassuring. You should contact your family doctor within the next 24 hours for a follow up appointment    David Goyal!!!    From [facility] ([facility]) and [facility] Emergency Services    On behalf of the Emergency Department staff at [facility]), I would like to thank you for giving us the opportunity to address your health care needs and concerns. We hope that during your visit, our service was delivered in a professional and caring manner. Please keep [facility]) in mind as we walk with you down the path to your own personal wellness. Please expect an automated text message or email from us so we can ask a few questions about your health and progress. Based on your answers, a clinician may call you back to offer help and instructions. Please understand that early in the process of an illness or injury, an emergency department workup can be falsely reassuring. If you notice any worsening, changing or persistent symptoms please call your family doctor or return to the ER immediately. Tell us how we did during your visit at [email or web address]   and let us know about

## 2023-06-05 NOTE — ED PROVIDER NOTES
[facility] Emergency Department      Pt Name: Gemma Bocanegra  MRN: [id number]  [place] 1950  Date of evaluation: 6/5/2023    EMERGENCY DEPARTMENT ENCOUNTER      PERTINENT ATTENDING PHYSICIAN COMMENTS:      Faculty Attestation    I performed a history and physical examination of the patient and discussed management with the mid level provideer. I reviewed the mid level provider's note and agree with the documented findings and plan of care. Any areas of disagreement are noted on the chart. I was personally present for the key portions of any procedures. I have documented in the chart those procedures where I was not present during the key portions. I have reviewed the emergency nurses triage note. I agree with the chief complaint, past medical history, past surgical history, allergies, medications, social and family history as documented unless otherwise noted below. Documentation of the HPI, Physical Exam and Medical Decision Making performed by medical students or scribes is based on my personal performance of the HPI, PE and MDM. For Physician Assistant/ Nurse Practitioner cases/documentation I have personally evaluated this patient and have completed at least one if not all key elements of the E/M (history, physical exam, and MDM). Additional findings are as noted. CHIEF COMPLAINT       Chief Complaint   Patient presents with    Dysuria     Onset Thursday- pt has had hx of uti     Urinary Frequency       HISTORY OF PRESENT ILLNESS    Gemma Bocanegra is a 81 y.o. female who presents to the emergency department with UTI signs and symptoms started Thursday with dysuria and frequency. Denies abdominal pain no fevers or chills no nausea or vomiting. No other symptoms.     PAST MEDICAL HISTORY    has a past medical history of A-fib ([address] Anxiety, Arthritis, Blood transfusion reaction, CAD (coronary artery disease), Carotid stenosis, COPD (chronic obstructive pulmonary disease) ([address] GERD
pain, fever, a feeling of passing out, light headed, dizziness, chest pain, shortness of breath, persistent nausea and/or vomiting, numbness or weakness to the arms or legs, coolness or color change of the arms or legs) that necessitate immediate return. The patient understands that at this time there is no evidence for a more malignant underlying process, but the patient also understands that early in the process of an illness or injury, an emergency department workup can be falsely reassuring. Routine discharge counseling was given, and the patient understands that worsening, changing or persistent symptoms should prompt an immediate call or follow up with their primary physician or return to the emergency department. The importance of appropriate follow up was also discussed. I have reviewed the disposition diagnosis with the patient and or their family/guardian. I have answered their questions and given discharge instructions. They voiced understanding of these instructions and did not have any further questions or complaints. REASSESSMENT          CRITICAL CARE TIME       CONSULTS:  None    PROCEDURES:  Unless otherwise noted below, none     Procedures        FINAL IMPRESSION      1. Urinary tract infection without hematuria, site unspecified          DISPOSITION/PLAN   DISPOSITION Decision To Discharge 06/05/2023 12:48:10 PM      PATIENT REFERRED TO:  Maria Isabel Cheung MD  [address]  [phone]  [address]  [phone]    In 2 days      [facility] Emergency Department  [address] [address]  [phone]    If symptoms worsen      DISCHARGE MEDICATIONS:  Discharge Medication List as of 6/5/2023 12:49 PM        START taking these medications    Details   cephALEXin (KEFLEX) 500 MG capsule Take 1 capsule by mouth 2 times daily for 7 days, Disp-14 capsule, R-0Normal      phenazopyridine (PYRIDIUM) 100 MG tablet Take 1 tablet by mouth 3 times

## 2023-06-07 LAB
MICROORGANISM SPEC CULT: ABNORMAL
SPECIMEN DESCRIPTION: ABNORMAL

## 2023-09-15 ENCOUNTER — HOSPITAL ENCOUNTER (OUTPATIENT)
Age: 73
Discharge: HOME | End: 2023-09-15
Payer: MEDICARE

## 2023-09-15 LAB
BASOPHILS # BLD: 0.07 K/UL (ref 0–0.2)
BASOPHILS NFR BLD: 1 % (ref 0–2)
BILIRUB UR QL STRIP: NEGATIVE
CASTS #/AREA URNS LPF: NORMAL /LPF (ref 0–8)
CLARITY UR: CLEAR
COLOR UR: YELLOW
EOSINOPHIL # BLD: <0.03 K/UL (ref 0–0.44)
EOSINOPHILS RELATIVE PERCENT: 0 % (ref 1–4)
EPI CELLS #/AREA URNS HPF: NORMAL /HPF (ref 0–5)
ERYTHROCYTE [DISTWIDTH] IN BLOOD BY AUTOMATED COUNT: 13.9 % (ref 11.8–14.4)
GLUCOSE UR STRIP-MCNC: NEGATIVE MG/DL
HCT VFR BLD AUTO: 37.8 % (ref 36.3–47.1)
HGB BLD-MCNC: 11.8 G/DL (ref 11.9–15.1)
HGB UR QL STRIP.AUTO: NEGATIVE
IMM GRANULOCYTES # BLD AUTO: 0.33 K/UL (ref 0–0.3)
IMM GRANULOCYTES NFR BLD: 3 %
KETONES UR STRIP-MCNC: NEGATIVE MG/DL
LEUKOCYTE ESTERASE UR QL STRIP: ABNORMAL
LYMPHOCYTES NFR BLD: 3.02 K/UL (ref 1.1–3.7)
LYMPHOCYTES RELATIVE PERCENT: 26 % (ref 24–43)
MCH RBC QN AUTO: 30.3 PG (ref 25.2–33.5)
MCHC RBC AUTO-ENTMCNC: 31.2 G/DL (ref 28.4–34.8)
MCV RBC AUTO: 96.9 FL (ref 82.6–102.9)
MONOCYTES NFR BLD: 0.76 K/UL (ref 0.1–1.2)
MONOCYTES NFR BLD: 7 % (ref 3–12)
NEUTROPHILS NFR BLD: 63 % (ref 36–65)
NEUTS SEG NFR BLD: 7.3 K/UL (ref 1.5–8.1)
NITRITE UR QL STRIP: NEGATIVE
NRBC BLD-RTO: 0 PER 100 WBC
PH UR STRIP: 6 [PH] (ref 5–8)
PLATELET # BLD AUTO: 295 K/UL (ref 138–453)
PMV BLD AUTO: 10.9 FL (ref 8.1–13.5)
PROT UR STRIP-MCNC: NEGATIVE MG/DL
RBC # BLD AUTO: 3.9 M/UL (ref 3.95–5.11)
RBC #/AREA URNS HPF: NORMAL /HPF (ref 0–4)
SP GR UR STRIP: 1.01 (ref 1–1.03)
UROBILINOGEN UR STRIP-ACNC: NORMAL EU/DL (ref 0–1)
WBC #/AREA URNS HPF: NORMAL /HPF (ref 0–5)
WBC OTHER # BLD: 11.5 K/UL (ref 3.5–11.3)

## 2023-09-15 PROCEDURE — 36415 COLL VENOUS BLD VENIPUNCTURE: CPT

## 2023-09-15 PROCEDURE — 87086 URINE CULTURE/COLONY COUNT: CPT

## 2023-09-15 PROCEDURE — 83970 ASSAY OF PARATHORMONE: CPT

## 2023-09-15 PROCEDURE — 81001 URINALYSIS AUTO W/SCOPE: CPT

## 2023-09-15 PROCEDURE — 80053 COMPREHEN METABOLIC PANEL: CPT

## 2023-09-15 PROCEDURE — 85025 COMPLETE CBC W/AUTO DIFF WBC: CPT

## 2023-09-15 PROCEDURE — 82306 VITAMIN D 25 HYDROXY: CPT

## 2023-09-16 LAB
25(OH)D3 SERPL-MCNC: 58.5 NG/ML
ALBUMIN SERPL-MCNC: 3.7 G/DL (ref 3.5–5.2)
ALBUMIN/GLOB SERPL: 1.3 {RATIO} (ref 1–2.5)
ALP SERPL-CCNC: 82 U/L (ref 35–104)
ALT SERPL-CCNC: 12 U/L (ref 5–33)
ANION GAP SERPL CALCULATED.3IONS-SCNC: 13 MMOL/L (ref 9–17)
AST SERPL-CCNC: 16 U/L
BILIRUB SERPL-MCNC: 0.2 MG/DL (ref 0.3–1.2)
BUN SERPL-MCNC: 13 MG/DL (ref 8–23)
CALCIUM SERPL-MCNC: 9.6 MG/DL (ref 8.6–10.4)
CHLORIDE SERPL-SCNC: 101 MMOL/L (ref 98–107)
CO2 SERPL-SCNC: 27 MMOL/L (ref 20–31)
CREAT SERPL-MCNC: 1 MG/DL (ref 0.5–0.9)
GFR SERPL CREATININE-BSD FRML MDRD: >60 ML/MIN/1.73M2
GLUCOSE SERPL-MCNC: 116 MG/DL (ref 70–99)
MICROORGANISM SPEC CULT: NORMAL
POTASSIUM SERPL-SCNC: 3.9 MMOL/L (ref 3.7–5.3)
PROT SERPL-MCNC: 6.6 G/DL (ref 6.4–8.3)
PTH-INTACT SERPL-MCNC: 27 PG/ML (ref 14–72)
SODIUM SERPL-SCNC: 141 MMOL/L (ref 135–144)
SPECIMEN DESCRIPTION: NORMAL

## 2023-12-30 ENCOUNTER — HOSPITAL ENCOUNTER (EMERGENCY)
Age: 73
Discharge: HOME | End: 2023-12-31
Attending: EMERGENCY MEDICINE
Payer: MEDICARE

## 2023-12-30 ENCOUNTER — APPOINTMENT (OUTPATIENT)
Dept: GENERAL RADIOLOGY | Age: 73
End: 2023-12-30
Payer: MEDICARE

## 2023-12-30 ENCOUNTER — HOSPITAL ENCOUNTER (OUTPATIENT)
Age: 73
Discharge: HOME | End: 2023-12-30

## 2023-12-30 DIAGNOSIS — R53.83 OTHER FATIGUE: ICD-10-CM

## 2023-12-30 DIAGNOSIS — N30.00 ACUTE CYSTITIS WITHOUT HEMATURIA: ICD-10-CM

## 2023-12-30 DIAGNOSIS — N17.9 AKI (ACUTE KIDNEY INJURY) (HCC): ICD-10-CM

## 2023-12-30 DIAGNOSIS — R53.1 GENERAL WEAKNESS: Primary | ICD-10-CM

## 2023-12-30 DIAGNOSIS — U07.1 COVID-19: ICD-10-CM

## 2023-12-30 LAB
ANION GAP SERPL CALCULATED.3IONS-SCNC: 13 MMOL/L (ref 9–17)
BACTERIA URNS QL MICRO: ABNORMAL
BASOPHILS # BLD: 0.1 K/UL (ref 0–0.2)
BASOPHILS NFR BLD: 1 % (ref 0–2)
BILIRUB UR QL STRIP: NEGATIVE
BNP SERPL-MCNC: 856 PG/ML
BUN SERPL-MCNC: 13 MG/DL (ref 8–23)
CALCIUM SERPL-MCNC: 10.4 MG/DL (ref 8.6–10.4)
CHLORIDE SERPL-SCNC: 101 MMOL/L (ref 98–107)
CLARITY UR: ABNORMAL
CO2 SERPL-SCNC: 25 MMOL/L (ref 20–31)
COLOR UR: YELLOW
CREAT SERPL-MCNC: 1.3 MG/DL (ref 0.5–0.9)
EOSINOPHIL # BLD: 0.2 K/UL (ref 0–0.4)
EOSINOPHILS RELATIVE PERCENT: 1 % (ref 1–4)
EPI CELLS #/AREA URNS HPF: ABNORMAL /HPF (ref 0–5)
ERYTHROCYTE [DISTWIDTH] IN BLOOD BY AUTOMATED COUNT: 14.8 % (ref 12.5–15.4)
GFR SERPL CREATININE-BSD FRML MDRD: 44 ML/MIN/1.73M2
GLUCOSE SERPL-MCNC: 140 MG/DL (ref 70–99)
GLUCOSE UR STRIP-MCNC: NEGATIVE MG/DL
HCT VFR BLD AUTO: 35.5 % (ref 36–46)
HGB BLD-MCNC: 12 G/DL (ref 12–16)
HGB UR QL STRIP.AUTO: NEGATIVE
KETONES UR STRIP-MCNC: NEGATIVE MG/DL
LEUKOCYTE ESTERASE UR QL STRIP: ABNORMAL
LYMPHOCYTES NFR BLD: 3.1 K/UL (ref 1–4.8)
LYMPHOCYTES RELATIVE PERCENT: 18 % (ref 24–44)
MCH RBC QN AUTO: 31.2 PG (ref 26–34)
MCHC RBC AUTO-ENTMCNC: 33.7 G/DL (ref 31–37)
MCV RBC AUTO: 92.6 FL (ref 80–100)
MONOCYTES NFR BLD: 1.4 K/UL (ref 0.1–1.2)
MONOCYTES NFR BLD: 8 % (ref 2–11)
MUCOUS THREADS URNS QL MICRO: ABNORMAL
NEUTROPHILS NFR BLD: 72 % (ref 36–66)
NEUTS SEG NFR BLD: 12.4 K/UL (ref 1.8–7.7)
NITRITE UR QL STRIP: NEGATIVE
PH UR STRIP: 6 [PH] (ref 5–8)
PLATELET # BLD AUTO: 299 K/UL (ref 140–450)
PMV BLD AUTO: 8.4 FL (ref 6–12)
POTASSIUM SERPL-SCNC: 4.3 MMOL/L (ref 3.7–5.3)
PROT UR STRIP-MCNC: ABNORMAL MG/DL
RBC # BLD AUTO: 3.84 M/UL (ref 4–5.2)
RBC #/AREA URNS HPF: ABNORMAL /HPF (ref 0–2)
SODIUM SERPL-SCNC: 139 MMOL/L (ref 135–144)
SP GR UR STRIP: 1.02 (ref 1–1.03)
TROPONIN I SERPL HS-MCNC: 34 NG/L (ref 0–14)
TROPONIN I SERPL HS-MCNC: 36 NG/L (ref 0–14)
TSH SERPL DL<=0.05 MIU/L-ACNC: 2.52 UIU/ML (ref 0.3–5)
UROBILINOGEN UR STRIP-ACNC: NORMAL EU/DL (ref 0–1)
WBC #/AREA URNS HPF: ABNORMAL /HPF (ref 0–5)
WBC OTHER # BLD: 17.2 K/UL (ref 3.5–11)

## 2023-12-30 PROCEDURE — 87086 URINE CULTURE/COLONY COUNT: CPT

## 2023-12-30 PROCEDURE — 84484 ASSAY OF TROPONIN QUANT: CPT

## 2023-12-30 PROCEDURE — 71046 X-RAY EXAM CHEST 2 VIEWS: CPT

## 2023-12-30 PROCEDURE — 84443 ASSAY THYROID STIM HORMONE: CPT

## 2023-12-30 PROCEDURE — 82247 BILIRUBIN TOTAL: CPT

## 2023-12-30 PROCEDURE — 82306 VITAMIN D 25 HYDROXY: CPT

## 2023-12-30 PROCEDURE — 36415 COLL VENOUS BLD VENIPUNCTURE: CPT

## 2023-12-30 PROCEDURE — 84460 ALANINE AMINO (ALT) (SGPT): CPT

## 2023-12-30 PROCEDURE — 84075 ASSAY ALKALINE PHOSPHATASE: CPT

## 2023-12-30 PROCEDURE — 93005 ELECTROCARDIOGRAM TRACING: CPT | Performed by: EMERGENCY MEDICINE

## 2023-12-30 PROCEDURE — 80048 BASIC METABOLIC PNL TOTAL CA: CPT

## 2023-12-30 PROCEDURE — 2580000003 HC RX 258: Performed by: EMERGENCY MEDICINE

## 2023-12-30 PROCEDURE — 96365 THER/PROPH/DIAG IV INF INIT: CPT

## 2023-12-30 PROCEDURE — 83970 ASSAY OF PARATHORMONE: CPT

## 2023-12-30 PROCEDURE — 6370000000 HC RX 637 (ALT 250 FOR IP): Performed by: EMERGENCY MEDICINE

## 2023-12-30 PROCEDURE — 84450 TRANSFERASE (AST) (SGOT): CPT

## 2023-12-30 PROCEDURE — 81001 URINALYSIS AUTO W/SCOPE: CPT

## 2023-12-30 PROCEDURE — 96366 THER/PROPH/DIAG IV INF ADDON: CPT

## 2023-12-30 PROCEDURE — 99285 EMERGENCY DEPT VISIT HI MDM: CPT

## 2023-12-30 PROCEDURE — 82040 ASSAY OF SERUM ALBUMIN: CPT

## 2023-12-30 PROCEDURE — 84155 ASSAY OF PROTEIN SERUM: CPT

## 2023-12-30 PROCEDURE — 6360000002 HC RX W HCPCS: Performed by: EMERGENCY MEDICINE

## 2023-12-30 PROCEDURE — 85025 COMPLETE CBC W/AUTO DIFF WBC: CPT

## 2023-12-30 PROCEDURE — 83880 ASSAY OF NATRIURETIC PEPTIDE: CPT

## 2023-12-30 RX ORDER — POTASSIUM CHLORIDE 1.5 G/1.58G
20 POWDER, FOR SOLUTION ORAL DAILY
COMMUNITY

## 2023-12-30 RX ORDER — 0.9 % SODIUM CHLORIDE 0.9 %
1000 INTRAVENOUS SOLUTION INTRAVENOUS ONCE
Status: COMPLETED | OUTPATIENT
Start: 2023-12-30 | End: 2023-12-31

## 2023-12-30 RX ORDER — BENZONATATE 100 MG/1
100 CAPSULE ORAL ONCE
Status: COMPLETED | OUTPATIENT
Start: 2023-12-30 | End: 2023-12-30

## 2023-12-30 RX ADMIN — SODIUM CHLORIDE 1000 ML: 9 INJECTION, SOLUTION INTRAVENOUS at 22:32

## 2023-12-30 RX ADMIN — CEFTRIAXONE SODIUM 1000 MG: 1 INJECTION, POWDER, FOR SOLUTION INTRAMUSCULAR; INTRAVENOUS at 22:30

## 2023-12-30 RX ADMIN — BENZONATATE 100 MG: 100 CAPSULE ORAL at 20:22

## 2023-12-30 ASSESSMENT — PAIN - FUNCTIONAL ASSESSMENT: PAIN_FUNCTIONAL_ASSESSMENT: NONE - DENIES PAIN

## 2023-12-30 NOTE — ED PROVIDER NOTES
within normal limits   BASIC METABOLIC PANEL W/ REFLEX TO MG FOR LOW K - Abnormal; Notable for the following components:    Glucose 140 (*)     Creatinine 1.3 (*)     Est, Glom Filt Rate 44 (*)     All other components within normal limits   TROPONIN - Abnormal; Notable for the following components:    Troponin, High Sensitivity 36 (*)     All other components within normal limits   BRAIN NATRIURETIC PEPTIDE - Abnormal; Notable for the following components:    Pro- (*)     All other components within normal limits   URINALYSIS WITH MICROSCOPIC - Abnormal; Notable for the following components:    Turbidity UA Cloudy (*)     Protein, UA TRACE (*)     Leukocyte Esterase, Urine LARGE (*)     Bacteria, UA MODERATE (*)     Mucus, UA 2+ (*)     All other components within normal limits   TROPONIN - Abnormal; Notable for the following components:    Troponin, High Sensitivity 34 (*)     All other components within normal limits   CULTURE, URINE   TSH WITH REFLEX   PTH, INTACT   VITAMIN D 25 HYDROXY       RADIOLOGY:  XR CHEST (2 VW)    Result Date: 12/30/2023  EXAMINATION: TWO XRAY VIEWS OF THE CHEST 12/30/2023 5:59 pm COMPARISON: 05/15/2023 HISTORY: ORDERING SYSTEM PROVIDED HISTORY: cough, fatigue TECHNOLOGIST PROVIDED HISTORY: cough, fatigue Reason for Exam: cough, fatigue FINDINGS: The lungs are without acute focal process.  There is no effusion or pneumothorax. The cardiomediastinal silhouette is stable. The osseous structures are stable.     No acute process.      EKG  EKG Interpretation  Interpreted by emergency department physician    Rhythm: atrial flutter  Rate: 97  Axis: normal  Ectopy: none  Conduction: normal  ST Segments: no acute change  T Waves: no acute change  Q Waves: none    Clinical Impression: non-specific EKG  All EKG's are interpreted by the Emergency Department Physician whoeither signs or Co-signs this chart in the absence of a cardiologist.    EMERGENCY DEPARTMENT COURSE:  ED Course as of

## 2023-12-31 VITALS
BODY MASS INDEX: 28.23 KG/M2 | DIASTOLIC BLOOD PRESSURE: 62 MMHG | SYSTOLIC BLOOD PRESSURE: 127 MMHG | HEIGHT: 64 IN | HEART RATE: 94 BPM | OXYGEN SATURATION: 97 % | WEIGHT: 165.34 LBS | TEMPERATURE: 97 F | RESPIRATION RATE: 16 BRPM

## 2023-12-31 LAB
25(OH)D3 SERPL-MCNC: 67.8 NG/ML
ALBUMIN SERPL-MCNC: 3.8 G/DL (ref 3.5–5.2)
ALP SERPL-CCNC: 105 U/L (ref 35–104)
ALT SERPL-CCNC: <5 U/L (ref 5–33)
AST SERPL-CCNC: 13 U/L
BILIRUB SERPL-MCNC: 0.8 MG/DL (ref 0.3–1.2)
PROT SERPL-MCNC: 7.4 G/DL (ref 6.4–8.3)
PTH-INTACT SERPL-MCNC: 18.9 PG/ML (ref 14–72)

## 2023-12-31 RX ORDER — CEPHALEXIN 500 MG/1
500 CAPSULE ORAL 2 TIMES DAILY
Qty: 20 CAPSULE | Refills: 0 | Status: SHIPPED | OUTPATIENT
Start: 2023-12-31 | End: 2024-01-10

## 2023-12-31 RX ORDER — BENZONATATE 100 MG/1
100 CAPSULE ORAL 3 TIMES DAILY PRN
Qty: 30 CAPSULE | Refills: 0 | Status: SHIPPED | OUTPATIENT
Start: 2023-12-31 | End: 2024-01-07

## 2023-12-31 NOTE — DISCHARGE INSTRUCTIONS
If you develop any fevers, or unable to tolerate your oral antibiotics, feel like you are not getting better within the next 24 hours or have any other symptoms or concerns please return to the emergency department immediately for reevaluation.  Regardless you should contact your primary care physician the first thing on Tuesday morning to schedule follow-up appointment from your ER visit.  You are prescribed antibiotics for your UTI, however if your urine culture which shows us what type of bacteria is in your urine shows that the bacteria is resistant to the medication I prescribed you will receive a phone call and a new prescription sent to the pharmacy.  Continue taking your other home medications as prescribed.  As we discussed COVID-19 is a virus for which antibiotics will not work for her.  The antibiotics that you are being prescribed are specifically for urinary tract infection.  The Tessalon Perles that I prescribed will help control the cough from the COVID.  You can expect to feel unwell from COVID-19 for about 7 to 10 days.

## 2024-01-01 LAB
MICROORGANISM SPEC CULT: NORMAL
SPECIMEN DESCRIPTION: NORMAL

## 2024-01-02 LAB
EKG ATRIAL RATE: 416 BPM
EKG Q-T INTERVAL: 350 MS
EKG QRS DURATION: 88 MS
EKG QTC CALCULATION (BAZETT): 444 MS
EKG R AXIS: -28 DEGREES
EKG T AXIS: 30 DEGREES
EKG VENTRICULAR RATE: 97 BPM

## 2024-01-16 ENCOUNTER — HOSPITAL ENCOUNTER (EMERGENCY)
Age: 74
Discharge: HOME | End: 2024-01-16
Attending: EMERGENCY MEDICINE
Payer: MEDICARE

## 2024-01-16 ENCOUNTER — APPOINTMENT (OUTPATIENT)
Dept: GENERAL RADIOLOGY | Age: 74
End: 2024-01-16
Payer: MEDICARE

## 2024-01-16 ENCOUNTER — APPOINTMENT (OUTPATIENT)
Dept: CT IMAGING | Age: 74
End: 2024-01-16
Payer: MEDICARE

## 2024-01-16 VITALS
TEMPERATURE: 97.2 F | HEIGHT: 63 IN | OXYGEN SATURATION: 99 % | DIASTOLIC BLOOD PRESSURE: 92 MMHG | WEIGHT: 167 LBS | HEART RATE: 100 BPM | SYSTOLIC BLOOD PRESSURE: 130 MMHG | BODY MASS INDEX: 29.59 KG/M2 | RESPIRATION RATE: 14 BRPM
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
## 2024-01-16 DIAGNOSIS — S09.90XA INJURY OF HEAD, INITIAL ENCOUNTER: ICD-10-CM

## 2024-01-16 DIAGNOSIS — S70.12XA CONTUSION OF LEFT THIGH, INITIAL ENCOUNTER: Primary | ICD-10-CM

## 2024-01-16 DIAGNOSIS — S40.012A CONTUSION OF LEFT SHOULDER, INITIAL ENCOUNTER: ICD-10-CM

## 2024-01-16 PROCEDURE — 73030 X-RAY EXAM OF SHOULDER: CPT

## 2024-01-16 PROCEDURE — 70450 CT HEAD/BRAIN W/O DYE: CPT

## 2024-01-16 PROCEDURE — 99284 EMERGENCY DEPT VISIT MOD MDM: CPT

## 2024-01-16 PROCEDURE — 73552 X-RAY EXAM OF FEMUR 2/>: CPT

## 2024-01-16 ASSESSMENT — ENCOUNTER SYMPTOMS
DIARRHEA: 0
ABDOMINAL PAIN: 0
SHORTNESS OF BREATH: 0
COUGH: 0
BACK PAIN: 0
NAUSEA: 0
VOMITING: 0
SORE THROAT: 0

## 2024-01-16 ASSESSMENT — PAIN DESCRIPTION - LOCATION: LOCATION: LEG;SHOULDER

## 2024-01-16 ASSESSMENT — PAIN DESCRIPTION - ORIENTATION: ORIENTATION: LEFT

## 2024-01-16 ASSESSMENT — PAIN - FUNCTIONAL ASSESSMENT: PAIN_FUNCTIONAL_ASSESSMENT: 0-10

## 2024-01-16 ASSESSMENT — PAIN SCALES - GENERAL: PAINLEVEL_OUTOF10: 4

## 2024-01-16 NOTE — ED PROVIDER NOTES
[facility] Emergency Department  [address]  [place] [place] [facility] [address]  Phone: [phone]  Fax: [phone]      Pt Name: Deborah Nuñez  MRN: [id number]  Birthdate 1950  Date of evaluation: 1/16/24    CHIEF COMPLAINT       Chief Complaint   Patient presents with    Fall     Pt arrives dt co fall at home. Pt states she tripped. Pt admits to left leg pain and left shoulder pain. Pt states she is unsure if she hurt her shoulder dt fall or not since she had pain prior. Pt is unsure if she hit her head. Pt denies loc. Pt is on xarelto        PAST MEDICAL HISTORY    has a past medical history of A-fib (HCC), Anxiety, Arthritis, Blood transfusion reaction, CAD (coronary artery disease), Carotid stenosis, COPD (chronic obstructive pulmonary disease) (HCC), GERD (gastroesophageal reflux disease), Hepatitis B, History of blood transfusion, Hyperlipidemia, Hypertension, Hypothyroid, Kidney disease, Legally blind, Macular edema, PVD (peripheral vascular disease) (HCC), Sleep apnea, and Subclavian artery stenosis (HCC).    SURGICAL HISTORY      has a past surgical history that includes Aorto-femoral Bypass Graft (2001); tumor removal (Left); Tonsillectomy; Toe amputation (2001); Cataract removal (Bilateral); Cholecystectomy (05/05/2014); Carotid endarterectomy; Cardiac catheterization (10/2012); eye surgery; eye surgery; Carotid endarterectomy (Left, 05/16/2019); Carotid endarterectomy (Left, 5/16/2019); and vascular surgery (N/A, 9/23/2022).    CURRENT MEDICATIONS       Previous Medications    ALBUTEROL (PROVENTIL HFA;VENTOLIN HFA) 108 (90 BASE) MCG/ACT INHALER    Inhale 2 puffs into the lungs every 4 hours as needed for Wheezing    ALENDRONATE (FOSAMAX) 35 MG TABLET    Take 1 tablet by mouth every 7 days    ALPRAZOLAM (XANAX) 1 MG TABLET    Take 1 tablet by mouth nightly as needed for Sleep.    ASPIRIN 81 MG EC TABLET    Take 1 tablet by mouth daily    BENZTROPINE (COGENTIN) 0.5 MG TABLET    Take

## 2024-01-16 NOTE — ED PROVIDER NOTES
[facility] EMERGENCY DEPARTMENT  EMERGENCY DEPARTMENT ENCOUNTER      Pt Name: Deborah Nuñez  MRN: [id number]  Birthdate 1950  Date of evaluation: 1/16/2024  Provider: RAYMUNDO Marin CNP  8:39 PM    CHIEF COMPLAINT       Chief Complaint   Patient presents with    Fall     Pt arrives dt co fall at home. Pt states she tripped. Pt admits to left leg pain and left shoulder pain. Pt states she is unsure if she hurt her shoulder dt fall or not since she had pain prior. Pt is unsure if she hit her head. Pt denies loc. Pt is on xarelto          HISTORY OF PRESENT ILLNESS    Deborah Nuñez is a 73 y.o. female who presents to the emergency department      This is a nontoxic-appearing 73-year-old female presenting to the emergency department for evaluation of fall injuries, patient states that she fell 2 days ago, mechanical trip fall, she has poor vision, and uses wheeled walker, she landed on her left side, she has had left lateral thigh pain left shoulder pain since the fall but has been able to ambulate, also reports that since December 24, 2023 when she tested positive for COVID-19 infection she has had difficulty with hearing in the left ear.  The patient is anticoagulated with Xarelto due to a history of A-fib, she states that she has intermittent headaches but no severe headaches has had no nausea or vomiting; the patient was concerned for possible DVT prompting her to come to the emergency department for evaluation due to the pain in the left lateral thigh, patient has not missed any doses of her Xarelto.    The history is provided by the patient.       Nursing Notes were reviewed.    REVIEW OF SYSTEMS       Review of Systems   Constitutional:  Negative for chills, fatigue and fever.        Positive for fall from standing.   HENT:  Negative for congestion and sore throat.         Positive for difficulty hearing left ear.   Respiratory:  Negative for cough and shortness of breath.

## 2024-01-16 NOTE — ED NOTES
Left ear irrigation with warm tap water and hydrogen peroxide.  Pt tolerated well.  Few, Small, cerumen flakes returned.

## 2024-01-16 NOTE — DISCHARGE INSTRUCTIONS
Ice pack therapy as tolerated to areas of pain.  Tylenol over-the-counter as tolerated to help with pain.    Return to the ER: Fevers, redness warmth swelling to the left shoulder, left thigh, chest pain or breathing difficulty, continued worsening headaches, weakness or confusion, vomiting; or any other concerning symptoms.

## 2024-03-24 NOTE — PLAN OF CARE
Problem: Pain - Adult  Goal: Verbalizes/displays adequate comfort level or baseline comfort level  Outcome: Progressing     Problem: Safety - Adult  Goal: Free from fall injury  Outcome: Progressing   The patient's goals for the shift include hemodynamic stability    The clinical goals for the shift include Hemodynamic stability throughout the shift ending 3/24/24 @ 0700 hrs.    Over the shift, the patient did make progress toward the following goals.    Problem: Discharge Planning  Goal: Discharge to home or other facility with appropriate resources  2/14/2023 1233 by Johnna Daley RN  Outcome: Progressing  Flowsheets (Taken 2/14/2023 0800)  Discharge to home or other facility with appropriate resources: Identify barriers to discharge with patient and caregiver  2/14/2023 0034 by Seema Martinez RN  Outcome: Progressing     Problem: Pain  Goal: Verbalizes/displays adequate comfort level or baseline comfort level  2/14/2023 1233 by Johnna Daley RN  Outcome: Progressing  2/14/2023 0034 by Seema Martinez RN  Outcome: Progressing     Problem: Safety - Adult  Goal: Free from fall injury  2/14/2023 1233 by Johnna Daley RN  Outcome: Progressing  2/14/2023 0034 by Seema Martinez RN  Outcome: Progressing

## 2024-04-06 ENCOUNTER — HOSPITAL ENCOUNTER (EMERGENCY)
Age: 74
Discharge: HOME | End: 2024-04-06
Attending: EMERGENCY MEDICINE
Payer: MEDICARE

## 2024-04-06 VITALS
BODY MASS INDEX: 29.59 KG/M2 | WEIGHT: 167 LBS | HEIGHT: 63 IN | TEMPERATURE: 99.4 F | RESPIRATION RATE: 20 BRPM | DIASTOLIC BLOOD PRESSURE: 77 MMHG | SYSTOLIC BLOOD PRESSURE: 135 MMHG | HEART RATE: 99 BPM | OXYGEN SATURATION: 98 %

## 2024-04-06 DIAGNOSIS — B37.2 YEAST INFECTION OF THE SKIN: ICD-10-CM

## 2024-04-06 DIAGNOSIS — N30.01 ACUTE CYSTITIS WITH HEMATURIA: Primary | ICD-10-CM

## 2024-04-06 DIAGNOSIS — L03.818 CELLULITIS OF OTHER SPECIFIED SITE: ICD-10-CM

## 2024-04-06 LAB
ALBUMIN SERPL-MCNC: 4.1 G/DL (ref 3.5–5.2)
ALBUMIN/GLOB SERPL: 1.4 {RATIO} (ref 1–2.5)
ALP SERPL-CCNC: 98 U/L (ref 35–104)
ALT SERPL-CCNC: 8 U/L (ref 5–33)
ANION GAP SERPL CALCULATED.3IONS-SCNC: 12 MMOL/L (ref 9–17)
AST SERPL-CCNC: 14 U/L
BACTERIA URNS QL MICRO: ABNORMAL
BASOPHILS # BLD: 0.1 K/UL (ref 0–0.2)
BASOPHILS NFR BLD: 1 % (ref 0–2)
BILIRUB SERPL-MCNC: 0.4 MG/DL (ref 0.3–1.2)
BILIRUB UR QL STRIP: NEGATIVE
BUN SERPL-MCNC: 14 MG/DL (ref 8–23)
CALCIUM SERPL-MCNC: 10 MG/DL (ref 8.6–10.4)
CHLORIDE SERPL-SCNC: 104 MMOL/L (ref 98–107)
CLARITY UR: CLEAR
CO2 SERPL-SCNC: 25 MMOL/L (ref 20–31)
COLOR UR: YELLOW
CREAT SERPL-MCNC: 1.3 MG/DL (ref 0.5–0.9)
EOSINOPHIL # BLD: 0.2 K/UL (ref 0–0.4)
EOSINOPHILS RELATIVE PERCENT: 3 % (ref 1–4)
EPI CELLS #/AREA URNS HPF: ABNORMAL /HPF (ref 0–5)
ERYTHROCYTE [DISTWIDTH] IN BLOOD BY AUTOMATED COUNT: 15.5 % (ref 12.5–15.4)
GFR SERPL CREATININE-BSD FRML MDRD: 44 ML/MIN/1.73M2
GLUCOSE SERPL-MCNC: 102 MG/DL (ref 70–99)
GLUCOSE UR STRIP-MCNC: NEGATIVE MG/DL
HCT VFR BLD AUTO: 34.6 % (ref 36–46)
HGB BLD-MCNC: 11.7 G/DL (ref 12–16)
HGB UR QL STRIP.AUTO: NEGATIVE
KETONES UR STRIP-MCNC: NEGATIVE MG/DL
LEUKOCYTE ESTERASE UR QL STRIP: ABNORMAL
LYMPHOCYTES NFR BLD: 2.2 K/UL (ref 1–4.8)
LYMPHOCYTES RELATIVE PERCENT: 25 % (ref 24–44)
MCH RBC QN AUTO: 31.7 PG (ref 26–34)
MCHC RBC AUTO-ENTMCNC: 33.7 G/DL (ref 31–37)
MCV RBC AUTO: 94.1 FL (ref 80–100)
MONOCYTES NFR BLD: 0.6 K/UL (ref 0.1–1.2)
MONOCYTES NFR BLD: 7 % (ref 2–11)
MUCOUS THREADS URNS QL MICRO: ABNORMAL
NEUTROPHILS NFR BLD: 64 % (ref 36–66)
NEUTS SEG NFR BLD: 5.6 K/UL (ref 1.8–7.7)
NITRITE UR QL STRIP: NEGATIVE
PH UR STRIP: 5.5 [PH] (ref 5–8)
PLATELET # BLD AUTO: 222 K/UL (ref 140–450)
PMV BLD AUTO: 9.1 FL (ref 6–12)
POTASSIUM SERPL-SCNC: 4.7 MMOL/L (ref 3.7–5.3)
PROT SERPL-MCNC: 7 G/DL (ref 6.4–8.3)
PROT UR STRIP-MCNC: NEGATIVE MG/DL
RBC # BLD AUTO: 3.68 M/UL (ref 4–5.2)
RBC #/AREA URNS HPF: ABNORMAL /HPF (ref 0–2)
SODIUM SERPL-SCNC: 141 MMOL/L (ref 135–144)
SP GR UR STRIP: 1.02 (ref 1–1.03)
UROBILINOGEN UR STRIP-ACNC: NORMAL EU/DL (ref 0–1)
WBC #/AREA URNS HPF: ABNORMAL /HPF (ref 0–5)
WBC OTHER # BLD: 8.6 K/UL (ref 3.5–11)

## 2024-04-06 PROCEDURE — 36415 COLL VENOUS BLD VENIPUNCTURE: CPT

## 2024-04-06 PROCEDURE — 81001 URINALYSIS AUTO W/SCOPE: CPT

## 2024-04-06 PROCEDURE — 6370000000 HC RX 637 (ALT 250 FOR IP): Performed by: NURSE PRACTITIONER

## 2024-04-06 PROCEDURE — 99283 EMERGENCY DEPT VISIT LOW MDM: CPT

## 2024-04-06 PROCEDURE — 85025 COMPLETE CBC W/AUTO DIFF WBC: CPT

## 2024-04-06 PROCEDURE — 80053 COMPREHEN METABOLIC PANEL: CPT

## 2024-04-06 PROCEDURE — 87086 URINE CULTURE/COLONY COUNT: CPT

## 2024-04-06 RX ORDER — CEPHALEXIN 250 MG/1
500 CAPSULE ORAL ONCE
Status: COMPLETED | OUTPATIENT
Start: 2024-04-06 | End: 2024-04-06

## 2024-04-06 RX ORDER — CEPHALEXIN 500 MG/1
500 CAPSULE ORAL 4 TIMES DAILY
Qty: 40 CAPSULE | Refills: 0 | Status: SHIPPED | OUTPATIENT
Start: 2024-04-06 | End: 2024-04-16

## 2024-04-06 RX ORDER — NYSTATIN 100000 [USP'U]/G
POWDER TOPICAL
Qty: 60 G | Refills: 0 | Status: SHIPPED | OUTPATIENT
Start: 2024-04-06

## 2024-04-06 RX ADMIN — CEPHALEXIN 500 MG: 250 CAPSULE ORAL at 18:41

## 2024-04-06 ASSESSMENT — PAIN - FUNCTIONAL ASSESSMENT: PAIN_FUNCTIONAL_ASSESSMENT: NONE - DENIES PAIN

## 2024-04-06 ASSESSMENT — LIFESTYLE VARIABLES
HOW MANY STANDARD DRINKS CONTAINING ALCOHOL DO YOU HAVE ON A TYPICAL DAY: PATIENT DOES NOT DRINK
HOW OFTEN DO YOU HAVE A DRINK CONTAINING ALCOHOL: NEVER

## 2024-04-06 NOTE — ED PROVIDER NOTES
[facility] Emergency Department  [address]  [facility] [address]  Phone: [phone]  Fax: [phone]      Attending Physician Attestation    I performed a history and physical examination of the patient and discussed management with the mid level provider. I reviewed the mid level provider's note and agree with the documented findings and plan of care. Any areas of disagreement are noted on the chart. I was personally present for the key portions of any procedures. I have documented in the chart those procedures where I was not present during the key portions. I have reviewed the emergency nurses triage note. I agree with the chief complaint, past medical history, past surgical history, allergies, medications, social and family history as documented unless otherwise noted below. Documentation of the HPI, Physical Exam and Medical Decision Making performed by mid level providers is based on my personal performance of the HPI, PE and MDM. For Physician Assistant/ Nurse Practitioner cases/documentation I have personally evaluated this patient and have completed at least one if not all key elements of the E/M (history, physical exam, and MDM). Additional findings are as noted.      CHIEF COMPLAINT       Chief Complaint   Patient presents with    Leg Swelling     Swelling, warmth, without pain        PAST MEDICAL HISTORY     Past Medical History:   Diagnosis Date    A-fib (HCC)     Anxiety     Arthritis     Blood transfusion reaction     2001 with surgery    CAD (coronary artery disease)     Carotid stenosis     COPD (chronic obstructive pulmonary disease) (HCC)     GERD (gastroesophageal reflux disease)     Hepatitis B 1972    History of blood transfusion 2001    Hyperlipidemia     Hypertension     Hypothyroid     Kidney disease     GFR 60, with mass lower left chamber and right upper   Stage 3    Legally blind     patient gets eye appointment    Macular edema 2013    eye  Effort: Pulmonary effort is normal. No respiratory distress.      Breath sounds: Normal breath sounds. No stridor. No wheezing.   Abdominal:      General: Abdomen is flat. There is no distension.      Palpations: Abdomen is soft.      Tenderness: There is no abdominal tenderness.   Musculoskeletal:      Cervical back: Normal range of motion and neck supple.      Right lower leg: No edema.      Left lower leg: No edema.      Comments: Patient has some erythema over the anterior thigh.  Consistent with cellulitis.  Patient has what appears to be chronic Candida infection in her inguinal folds.  I did look at her groin with chaperoned exam Leatha AVELAR at bedside did not reveal any skin changes indicative of necrotizing fasciitis.  No black skin.  No pain out of proportion.  No history of diabetes per the patient.  Patient does not have any hemorrhagic bullae   Skin:     Capillary Refill: Capillary refill takes less than 2 seconds.   Neurological:      Mental Status: She is alert and oriented to person, place, and time.      Sensory: No sensory deficit.   Psychiatric:         Mood and Affect: Mood normal.         Behavior: Behavior normal.       Patient complains of a rash and leg swelling.  Patient's physical exam is not consistent with a DVT.  Patient's physical exam is not consistent with necrotizing fasciitis.  Does appear to be a cellulitis.  No vesicles.  No pustules.  No hemorrhagic bullae.  Patient does appear to have what appears to be a contact dermatitis versus cellulitis.  Patient instructed to discontinue aware of new articles of clothing.  Instructed to take antibiotics.  Given strict and specific return precautions.  Patient's pulses are intact distally.  Cap refill less than 2 seconds.  Not consistent with vasculitis         PROCEDURES:  Procedures    Critical Care:  None      Discharge DISPOSITION Decision To Discharge 04/06/2024 06:09:34 PM    Patient was informed of their diagnosis and told to follow

## 2024-04-06 NOTE — DISCHARGE INSTRUCTIONS
Complete antibiotic as prescribed.  Use nystatin powder as prescribed.    Return to the ER: Fevers, increased redness warmth or swelling to the groin legs, streaks of red or redness going up the abdomen, weakness or confusion, nausea or vomiting; or any other concerning symptoms.

## 2024-04-06 NOTE — ED PROVIDER NOTES
[facility] EMERGENCY DEPARTMENT  EMERGENCY DEPARTMENT ENCOUNTER      Pt Name: Deborah Nuñez  MRN: [id number]  Birthdate 1950  Date of evaluation: 4/6/2024  Provider: RAYMUNDO Marin CNP  10:50 AM    CHIEF COMPLAINT       Chief Complaint   Patient presents with    Leg Swelling     Swelling, warmth, without pain         HISTORY OF PRESENT ILLNESS    Deborah Nuñez is a 73 y.o. female who presents to the emergency department      This is a nontoxic-appearing 73-year-old female presenting via private auto patient reports that she had an appointment with Dr. Mejia on 21 March, states that she noticed on her MyChart that she had infection in her urine, she thought she had a family physician appointment March 30, 2024 however she states that her appointment was really April 30, 2024, she denies that she is having any urinary symptoms but states that she has had recent urine infections, states that today she noticed that she had some redness on the anterior aspect of both of her upper thighs; was concerned for infection prompting her to come to the emergency department for evaluation.  Patient denies any fevers, no acute chest pain or shortness of breath, has had no nausea or vomiting reports that she has been moving her bowels without difficulty, and no recent falls or injuries.  The patient lives with her grandson.  The patient is anticoagulated with Xarelto-history of A-fib; history of peripheral vascular disease.    The history is provided by the patient and medical records.       Nursing Notes were reviewed.    REVIEW OF SYSTEMS       Review of Systems   Constitutional:  Negative for chills, fatigue and fever.   HENT:  Negative for congestion and sore throat.    Respiratory:  Negative for cough and shortness of breath.    Cardiovascular:  Negative for chest pain and leg swelling.   Gastrointestinal:  Negative for abdominal pain, diarrhea, nausea and vomiting.   Genitourinary:  Negative for  Blood Pressure Father     Heart Disease Father     Cancer Mother         Multiple myeloma    Cancer Sister         Lung with mets to brain    Cancer Maternal Aunt         Several aunts had unknown cancer    Heart Attack Brother     Liver Disease Brother     Heart Defect Sister           SOCIAL HISTORY       Social History     Socioeconomic History    Marital status:    Tobacco Use    Smoking status: Former     Current packs/day: 0.00     Types: Cigarettes     Quit date: 2014     Years since quittin.9    Smokeless tobacco: Former    Tobacco comments:     USING E-CIG   Vaping Use    Vaping Use: Every day    Substances: Always   Substance and Sexual Activity    Alcohol use: No     Alcohol/week: 0.0 standard drinks of alcohol    Drug use: No   Social History Narrative    Lives alone     Social Determinants of Health     Financial Resource Strain: Unknown (10/6/2021)    Overall Financial Resource Strain (CARDIA)     Difficulty of Paying Living Expenses: Patient declined   Food Insecurity: Unknown (10/6/2021)    Hunger Vital Sign     Worried About Running Out of Food in the Last Year: Patient declined     Ran Out of Food in the Last Year: Patient declined       SCREENINGS                               CIWA Assessment  BP: 135/77  Pulse: 99                 PHYSICAL EXAM       ED Triage Vitals [24 1534]   BP Temp Temp Source Pulse Respirations SpO2 Height Weight - Scale   135/77 99.4 °F (37.4 °C) Oral 99 20 98 % 1.6 m (5' 3\") 75.8 kg (167 lb)       Physical Exam    DIAGNOSTIC RESULTS     EKG: All EKG's are interpreted by the Emergency Department Physician who either signs or Co-signs this chart in the absence of a cardiologist.        RADIOLOGY:   Non-plain film images such as CT, Ultrasound and MRI are read by the radiologist. Plain radiographic images are visualized and preliminarily interpreted by the emergency physician with the below findings:        Interpretation per the Radiologist below, if

## 2024-04-06 NOTE — ED TRIAGE NOTES
The patient is concerned about swelling in her left leg which has spread to her vaginal area and right leg slightly. The area is reddened.

## 2024-04-07 LAB
MICROORGANISM SPEC CULT: NO GROWTH
SPECIMEN DESCRIPTION: NORMAL

## 2024-04-08 ASSESSMENT — ENCOUNTER SYMPTOMS
BACK PAIN: 0
VOMITING: 0
NAUSEA: 0
SHORTNESS OF BREATH: 0
DIARRHEA: 0
ABDOMINAL PAIN: 0
COUGH: 0
SORE THROAT: 0

## 2024-07-31 ENCOUNTER — HOSPITAL ENCOUNTER (OUTPATIENT)
Age: 74
Discharge: HOME | End: 2024-08-02
Payer: MEDICARE

## 2024-07-31 ENCOUNTER — HOSPITAL ENCOUNTER (OUTPATIENT)
Dept: GENERAL RADIOLOGY | Age: 74
Discharge: HOME | End: 2024-08-02
Payer: MEDICARE

## 2024-07-31 DIAGNOSIS — M51.36 DEGENERATION OF LUMBAR INTERVERTEBRAL DISC: ICD-10-CM

## 2024-07-31 PROCEDURE — 72110 X-RAY EXAM L-2 SPINE 4/>VWS: CPT

## 2024-07-31 PROCEDURE — 72202 X-RAY EXAM SI JOINTS 3/> VWS: CPT

## 2024-10-08 ENCOUNTER — APPOINTMENT (OUTPATIENT)
Dept: CT IMAGING | Age: 74
End: 2024-10-08
Payer: MEDICARE

## 2024-10-08 ENCOUNTER — HOSPITAL ENCOUNTER (EMERGENCY)
Age: 74
Discharge: HOME | End: 2024-10-08
Attending: EMERGENCY MEDICINE
Payer: MEDICARE

## 2024-10-08 ENCOUNTER — HOSPITAL ENCOUNTER (OUTPATIENT)
Age: 74
Setting detail: SPECIMEN
Discharge: HOME | End: 2024-10-08
Payer: MEDICARE

## 2024-10-08 VITALS
HEIGHT: 63 IN | BODY MASS INDEX: 29.59 KG/M2 | HEART RATE: 81 BPM | TEMPERATURE: 98.6 F | OXYGEN SATURATION: 96 % | DIASTOLIC BLOOD PRESSURE: 70 MMHG | RESPIRATION RATE: 16 BRPM | SYSTOLIC BLOOD PRESSURE: 153 MMHG | WEIGHT: 167 LBS

## 2024-10-08 DIAGNOSIS — N30.00 ACUTE CYSTITIS WITHOUT HEMATURIA: Primary | ICD-10-CM

## 2024-10-08 DIAGNOSIS — I72.4: ICD-10-CM

## 2024-10-08 LAB
25(OH)D3 SERPL-MCNC: 58.8 NG/ML (ref 30–100)
ALBUMIN SERPL-MCNC: 4.4 G/DL (ref 3.5–5.2)
ALBUMIN SERPL-MCNC: 4.4 G/DL (ref 3.5–5.2)
ALBUMIN/GLOB SERPL: 1.5 {RATIO} (ref 1–2.5)
ALBUMIN/GLOB SERPL: 1.5 {RATIO} (ref 1–2.5)
ALP SERPL-CCNC: 106 U/L (ref 35–104)
ALP SERPL-CCNC: 106 U/L (ref 35–104)
ALT SERPL-CCNC: 17 U/L (ref 5–33)
ALT SERPL-CCNC: 17 U/L (ref 5–33)
ANION GAP SERPL CALCULATED.3IONS-SCNC: 12 MMOL/L (ref 9–17)
ANION GAP SERPL CALCULATED.3IONS-SCNC: 12 MMOL/L (ref 9–17)
AST SERPL-CCNC: 16 U/L
AST SERPL-CCNC: 16 U/L
BACTERIA URNS QL MICRO: ABNORMAL
BACTERIA URNS QL MICRO: ABNORMAL
BASOPHILS # BLD: 0.09 K/UL (ref 0–0.2)
BASOPHILS # BLD: 0.1 K/UL (ref 0–0.2)
BASOPHILS NFR BLD: 1 % (ref 0–2)
BASOPHILS NFR BLD: 1 % (ref 0–2)
BILIRUB DIRECT SERPL-MCNC: 0.2 MG/DL
BILIRUB INDIRECT SERPL-MCNC: 0.5 MG/DL (ref 0–1)
BILIRUB SERPL-MCNC: 0.7 MG/DL (ref 0.3–1.2)
BILIRUB SERPL-MCNC: 0.7 MG/DL (ref 0.3–1.2)
BILIRUB UR QL STRIP: NEGATIVE
BILIRUB UR QL STRIP: NEGATIVE
BUN SERPL-MCNC: 13 MG/DL (ref 8–23)
BUN SERPL-MCNC: 13 MG/DL (ref 8–23)
CALCIUM SERPL-MCNC: 10.2 MG/DL (ref 8.6–10.4)
CALCIUM SERPL-MCNC: 10.2 MG/DL (ref 8.6–10.4)
CHARACTER UR: ABNORMAL
CHLORIDE SERPL-SCNC: 103 MMOL/L (ref 98–107)
CHLORIDE SERPL-SCNC: 103 MMOL/L (ref 98–107)
CLARITY UR: CLEAR
CLARITY UR: CLEAR
CO2 SERPL-SCNC: 27 MMOL/L (ref 20–31)
CO2 SERPL-SCNC: 27 MMOL/L (ref 20–31)
COLOR UR: YELLOW
COLOR UR: YELLOW
CREAT SERPL-MCNC: 1.4 MG/DL (ref 0.5–0.9)
CREAT SERPL-MCNC: 1.4 MG/DL (ref 0.5–0.9)
EOSINOPHIL # BLD: 0.18 K/UL (ref 0–0.4)
EOSINOPHIL # BLD: 0.2 K/UL (ref 0–0.4)
EOSINOPHILS RELATIVE PERCENT: 2 % (ref 1–4)
EOSINOPHILS RELATIVE PERCENT: 2 % (ref 1–4)
EPI CELLS #/AREA URNS HPF: ABNORMAL /HPF (ref 0–5)
EPI CELLS #/AREA URNS HPF: ABNORMAL /HPF (ref 0–5)
ERYTHROCYTE [DISTWIDTH] IN BLOOD BY AUTOMATED COUNT: 15.7 % (ref 12.5–15.4)
ERYTHROCYTE [DISTWIDTH] IN BLOOD BY AUTOMATED COUNT: 15.7 % (ref 12.5–15.4)
GFR, ESTIMATED: 40 ML/MIN/1.73M2
GFR, ESTIMATED: 40 ML/MIN/1.73M2
GLUCOSE SERPL-MCNC: 111 MG/DL (ref 70–99)
GLUCOSE SERPL-MCNC: 111 MG/DL (ref 70–99)
GLUCOSE UR STRIP-MCNC: NEGATIVE MG/DL
GLUCOSE UR STRIP-MCNC: NEGATIVE MG/DL
HCT VFR BLD AUTO: 36.7 % (ref 36–46)
HCT VFR BLD AUTO: 36.7 % (ref 36–46)
HGB BLD-MCNC: 11.9 G/DL (ref 12–16)
HGB BLD-MCNC: 11.9 G/DL (ref 12–16)
HGB UR QL STRIP.AUTO: NEGATIVE
HGB UR QL STRIP.AUTO: NEGATIVE
KETONES UR STRIP-MCNC: NEGATIVE MG/DL
KETONES UR STRIP-MCNC: NEGATIVE MG/DL
LEUKOCYTE ESTERASE UR QL STRIP: ABNORMAL
LEUKOCYTE ESTERASE UR QL STRIP: ABNORMAL
LYMPHOCYTES NFR BLD: 2.28 K/UL (ref 1–4.8)
LYMPHOCYTES NFR BLD: 2.3 K/UL (ref 1–4.8)
LYMPHOCYTES RELATIVE PERCENT: 25 % (ref 24–44)
LYMPHOCYTES RELATIVE PERCENT: 25 % (ref 24–44)
MCH RBC QN AUTO: 31.2 PG (ref 26–34)
MCH RBC QN AUTO: 31.2 PG (ref 26–34)
MCHC RBC AUTO-ENTMCNC: 32.5 G/DL (ref 31–37)
MCHC RBC AUTO-ENTMCNC: 32.5 G/DL (ref 31–37)
MCV RBC AUTO: 95.7 FL (ref 80–100)
MCV RBC AUTO: 95.7 FL (ref 80–100)
MONOCYTES NFR BLD: 0.6 K/UL (ref 0.1–1.2)
MONOCYTES NFR BLD: 0.64 K/UL (ref 0.1–1.2)
MONOCYTES NFR BLD: 7 % (ref 2–11)
MONOCYTES NFR BLD: 7 % (ref 2–11)
MORPHOLOGY: NORMAL
MUCOUS THREADS URNS QL MICRO: ABNORMAL
MUCOUS THREADS URNS QL MICRO: ABNORMAL
NEUTROPHILS NFR BLD: 65 % (ref 36–66)
NEUTROPHILS NFR BLD: 65 % (ref 36–66)
NEUTS SEG NFR BLD: 5.9 K/UL (ref 1.8–7.7)
NEUTS SEG NFR BLD: 5.91 K/UL (ref 1.8–7.7)
NITRITE UR QL STRIP: NEGATIVE
NITRITE UR QL STRIP: NEGATIVE
PH UR STRIP: 6 [PH] (ref 5–8)
PH UR STRIP: 6 [PH] (ref 5–8)
PLATELET # BLD AUTO: 270 K/UL (ref 140–450)
PLATELET # BLD AUTO: 270 K/UL (ref 140–450)
PMV BLD AUTO: 8 FL (ref 6–12)
PMV BLD AUTO: 8 FL (ref 6–12)
POTASSIUM SERPL-SCNC: 4.4 MMOL/L (ref 3.7–5.3)
POTASSIUM SERPL-SCNC: 4.4 MMOL/L (ref 3.7–5.3)
PROT SERPL-MCNC: 7.3 G/DL (ref 6.4–8.3)
PROT SERPL-MCNC: 7.3 G/DL (ref 6.4–8.3)
PROT UR STRIP-MCNC: NEGATIVE MG/DL
PROT UR STRIP-MCNC: NEGATIVE MG/DL
PTH-INTACT SERPL-MCNC: 41 PG/ML (ref 15–65)
RBC # BLD AUTO: 3.84 M/UL (ref 4–5.2)
RBC # BLD AUTO: 3.84 M/UL (ref 4–5.2)
RBC #/AREA URNS HPF: ABNORMAL /HPF (ref 0–2)
RBC #/AREA URNS HPF: ABNORMAL /HPF (ref 0–2)
SODIUM SERPL-SCNC: 142 MMOL/L (ref 135–144)
SODIUM SERPL-SCNC: 142 MMOL/L (ref 135–144)
SP GR UR STRIP: 1.02 (ref 1–1.03)
SP GR UR STRIP: 1.02 (ref 1–1.03)
UROBILINOGEN UR STRIP-ACNC: NORMAL EU/DL (ref 0–1)
UROBILINOGEN UR STRIP-ACNC: NORMAL EU/DL (ref 0–1)
WBC #/AREA URNS HPF: ABNORMAL /HPF (ref 0–5)
WBC #/AREA URNS HPF: ABNORMAL /HPF (ref 0–5)
WBC OTHER # BLD: 9.1 K/UL (ref 3.5–11)
WBC OTHER # BLD: 9.1 K/UL (ref 3.5–11)

## 2024-10-08 PROCEDURE — 80048 BASIC METABOLIC PNL TOTAL CA: CPT

## 2024-10-08 PROCEDURE — 81001 URINALYSIS AUTO W/SCOPE: CPT

## 2024-10-08 PROCEDURE — 74176 CT ABD & PELVIS W/O CONTRAST: CPT

## 2024-10-08 PROCEDURE — 96365 THER/PROPH/DIAG IV INF INIT: CPT

## 2024-10-08 PROCEDURE — 6360000002 HC RX W HCPCS: Performed by: EMERGENCY MEDICINE

## 2024-10-08 PROCEDURE — 99284 EMERGENCY DEPT VISIT MOD MDM: CPT

## 2024-10-08 PROCEDURE — 36415 COLL VENOUS BLD VENIPUNCTURE: CPT

## 2024-10-08 PROCEDURE — 2580000003 HC RX 258: Performed by: EMERGENCY MEDICINE

## 2024-10-08 PROCEDURE — 82306 VITAMIN D 25 HYDROXY: CPT

## 2024-10-08 PROCEDURE — 83970 ASSAY OF PARATHORMONE: CPT

## 2024-10-08 PROCEDURE — 80053 COMPREHEN METABOLIC PANEL: CPT

## 2024-10-08 PROCEDURE — 85025 COMPLETE CBC W/AUTO DIFF WBC: CPT

## 2024-10-08 PROCEDURE — 80076 HEPATIC FUNCTION PANEL: CPT

## 2024-10-08 PROCEDURE — 87086 URINE CULTURE/COLONY COUNT: CPT

## 2024-10-08 RX ORDER — CEPHALEXIN 500 MG/1
500 CAPSULE ORAL 2 TIMES DAILY
Qty: 20 CAPSULE | Refills: 0 | Status: SHIPPED | OUTPATIENT
Start: 2024-10-08 | End: 2024-10-18

## 2024-10-08 RX ORDER — ACETAMINOPHEN 500 MG
1000 TABLET ORAL EVERY 8 HOURS PRN
Qty: 30 TABLET | Refills: 0 | Status: SHIPPED | OUTPATIENT
Start: 2024-10-08

## 2024-10-08 RX ADMIN — CEFTRIAXONE SODIUM 1000 MG: 1 INJECTION, POWDER, FOR SOLUTION INTRAMUSCULAR; INTRAVENOUS at 15:57

## 2024-10-08 ASSESSMENT — PAIN - FUNCTIONAL ASSESSMENT: PAIN_FUNCTIONAL_ASSESSMENT: NONE - DENIES PAIN

## 2024-10-08 NOTE — ED PROVIDER NOTES
[facility] EMERGENCY DEPARTMENT  Emergency Department Encounter  Mid Level Provider     Pt Name: Deborah Nuñez  MRN: [id number]  Birthdate 1950  Date of evaluation: 10/8/24  PCP:  Romeo Montiel MD    CHIEF COMPLAINT       Chief Complaint   Patient presents with    Urinary Frequency    Back Pain    Groin Pain     Pt arrives dt co right sided back pain and groin area. Pt states she has also been experiencing urinary frequency for a few weeks. Pt has history of chronic kidney disease stage 3       HISTORY OF PRESENT ILLNESS  (Location/Symptom, Timing/Onset,Context/Setting, Quality, Duration, Modifying Factors, Severity.)      Deborah Nuñez is a 73 y.o. female who presents with complaints of 3 weeks of urinary frequency, urgency, right flank pain, right groin pain. Patient reports a history of CKD III and reports she typically does not make much urine so the increase in urine had her concerned she has no associated dysuria but she has noted some right flank pain and right groin pain today. She also complains of some subjective fevers and chills today. She denies chest pain, shortness of breath, abdominal pain, headaches, visual disturbance, gait disturbances, numbness, tingling, or dizziness.     PAST MEDICAL /SURGICAL / SOCIAL / FAMILY HISTORY      has a past medical history of A-fib (HCC), Anxiety, Arthritis, Blood transfusion reaction, CAD (coronary artery disease), Carotid stenosis, COPD (chronic obstructive pulmonary disease) (HCC), GERD (gastroesophageal reflux disease), Hepatitis B, History of blood transfusion, Hyperlipidemia, Hypertension, Hypothyroid, Kidney disease, Legally blind, Macular edema, PVD (peripheral vascular disease) (HCC), Sleep apnea, and Subclavian artery stenosis (HCC).     has a past surgical history that includes Aorto-femoral Bypass Graft (2001); tumor removal (Left); Tonsillectomy; Toe amputation (2001); Cataract removal (Bilateral); Cholecystectomy (05/05/2014);  Absolute 0.60 0.1 - 1.2 k/uL    Eosinophils Absolute 0.20 0.0 - 0.4 k/uL    Basophils Absolute 0.10 0.0 - 0.2 k/uL       Consults:  none    Procedures:  none    Re-Evaluation & Disposition:  See attending physicians documentation.       FINAL IMPRESSION      1. Acute cystitis without hematuria    2. Aneurysm of common femoral artery (HCC)          DISPOSITION / PLAN     Disposition Decision To Discharge    Patient Refferred to:  Romeo Montiel MD  [address]  [facility] [address] [phone]          [facility] Emergency Department  [address] [address]  [phone]    As needed, If symptoms worsen    [place], [place]  [address] [id number] [phone]    Schedule an appointment as soon as possible for a visit         Discharge Medications:  Discharge Medication List as of 10/8/2024  3:42 PM        START taking these medications    Details   cephALEXin (KEFLEX) 500 MG capsule Take 1 capsule by mouth 2 times daily for 10 days, Disp-20 capsule, R-0Normal      acetaminophen (TYLENOL) 500 MG tablet Take 2 tablets by mouth every 8 hours as needed for Pain or Fever, Disp-30 tablet, R-0Normal             RAYMUNDO Zhang CNP   Emergency Medicine Nurse Practitioner    (Please note that portions of this note were completed with a voice recognitionprogram.  Efforts were made to edit the dictations but occasionally words are mis-transcribed.)       Gabe Orellana APRN - CNP  10/09/24 3598

## 2024-10-08 NOTE — DISCHARGE INSTRUCTIONS
There was an incidental finding of the artery in your right groin on imaging today.  I spoke to the vascular surgeon Dr. Delos Reyes who you have seen in the past.  He feels okay with you following up in the office about this and does not believe that you need transfer or admitted for it.  As we discussed her labs look like they always do.  Your urine is mildly infected, you got a dose of IV antibiotics here but are okay to go home with oral antibiotics for the time being.

## 2024-10-08 NOTE — ED PROVIDER NOTES
[facility] Emergency Department  [address]  [facility] [address]  Phone: [phone]  Fax: [phone]    Pt Name: Deborah Nuñez  MRN: [id number]  Birthdate 1950  Date of evaluation: 10/8/24    Deborah Nuñez is a 73 y.o. female with CC: Urinary Frequency, Back Pain, and Groin Pain (Pt arrives dt co right sided back pain and groin area. Pt states she has also been experiencing urinary frequency for a few weeks. Pt has history of chronic kidney disease stage 3)      MDM:   CKD stage III with urinary urgency and frequency for 3 weeks. Over past 2 days R flank and groin pain. No hx kidney stones. Sees Dr. Mejia with Nephrology     Urine questionably soft infection, will send for culture and give also Rocephin here.  No white count.  Creatinine at baseline.  She is nontoxic, not significantly tachycardic and afebrile.  When I evaluated her she is resting comfortably in the chair.  CT scan not showing any evidence of West or stone, incidental common femoral artery aneurysm on the right.  She has seen vascular surgery in the past, I did consult with them over [employer].  No indication for transfer or admission, can follow-up in the office.  I did discuss this with patient.  She is comfortable being discharged home on antibiotics and understands that she personally needs to call the vascular surgeons office to schedule a follow-up appointment.    Vitals:    10/08/24 1316 10/08/24 1318   BP:  125/80   Pulse: 100    Resp: 14    Temp: 98.8 °F (37.1 °C)    SpO2: 98%    Weight: 75.8 kg (167 lb)    Height: 1.6 m (5' 3\")        ED Course as of 10/08/24 1552   Tue Oct 08, 2024   1434 Attending to complete all remaining care, diagnosis and disposition for this patient.  We discussed this patient prior to my departure.  My note will be refreshed to reflect these details, though I was not actively involved in this patient's care following the time stamp above.   [AH]   1454 Creatinine(!):

## 2024-10-09 LAB
MICROORGANISM SPEC CULT: NORMAL
SERVICE CMNT-IMP: NORMAL
SPECIMEN DESCRIPTION: NORMAL

## 2024-10-16 ENCOUNTER — OFFICE VISIT (OUTPATIENT)
Age: 74
End: 2024-10-16
Payer: MEDICARE

## 2024-10-16 VITALS — BODY MASS INDEX: 29.59 KG/M2 | HEIGHT: 63 IN | WEIGHT: 167 LBS | RESPIRATION RATE: 18 BRPM | OXYGEN SATURATION: 99 %

## 2024-10-16 DIAGNOSIS — I73.9 PERIPHERAL ARTERIAL DISEASE (HCC): ICD-10-CM

## 2024-10-16 DIAGNOSIS — I65.23 BILATERAL CAROTID ARTERY STENOSIS: Primary | ICD-10-CM

## 2024-10-16 PROCEDURE — G8427 DOCREV CUR MEDS BY ELIG CLIN: HCPCS | Performed by: SURGERY

## 2024-10-16 PROCEDURE — 99213 OFFICE O/P EST LOW 20 MIN: CPT | Performed by: SURGERY

## 2024-10-16 PROCEDURE — 1090F PRES/ABSN URINE INCON ASSESS: CPT | Performed by: SURGERY

## 2024-10-16 PROCEDURE — G8400 PT W/DXA NO RESULTS DOC: HCPCS | Performed by: SURGERY

## 2024-10-16 PROCEDURE — 1036F TOBACCO NON-USER: CPT | Performed by: SURGERY

## 2024-10-16 PROCEDURE — G8484 FLU IMMUNIZE NO ADMIN: HCPCS | Performed by: SURGERY

## 2024-10-16 PROCEDURE — G8417 CALC BMI ABV UP PARAM F/U: HCPCS | Performed by: SURGERY

## 2024-10-16 PROCEDURE — 1123F ACP DISCUSS/DSCN MKR DOCD: CPT | Performed by: SURGERY

## 2024-10-16 PROCEDURE — 3017F COLORECTAL CA SCREEN DOC REV: CPT | Performed by: SURGERY

## 2024-10-16 NOTE — PROGRESS NOTES
2001    gangrene 2001    TONSILLECTOMY      TUMOR REMOVAL Left     carotid    VASCULAR SURGERY N/A 9/23/2022    FEMORAL EMBOLECTOMY THROMBECTOMY performed by [place] Delos Reyes, MD at [facility] OR       Social History:  reports that she quit smoking about 10 years ago. Her smoking use included cigarettes. She has quit using smokeless tobacco. She reports that she does not drink alcohol and does not use drugs.    Family History: family history includes Cancer in her maternal aunt, mother, and sister; Heart Attack in her brother and father; Heart Defect in her sister; Heart Disease in her father; High Blood Pressure in her father; Liver Disease in her brother; Stroke in her father.    Review of Systems:   Constitutional:  negative for  fevers, chills, sweats, fatigue, and weight loss  HEENT:  negative for vision or hearing changes,   Respiratory:  negative for shortness of breath, cough, or congestion  Cardiovascular:  negative for  chest pain, palpitations  Gastrointestinal:  negative for nausea, vomiting, diarrhea, constipation, abdominal pain  Genitourinary:  negative for frequency, dysuria  Integument/Breast:  negative for rash, skin lesions  Musculoskeletal:  negative for muscle aches or joint pain  Neurological:  negative for headaches, dizziness, lightheadedness, numbness, pain and tingling extremities  Behavior/Psych:  negative for depression and anxiety    Allergies: Iv contrast [iodides] and Nsaids    Current Meds:  Current Outpatient Medications:     cephALEXin (KEFLEX) 500 MG capsule, Take 1 capsule by mouth 2 times daily for 10 days, Disp: 20 capsule, Rfl: 0    acetaminophen (TYLENOL) 500 MG tablet, Take 2 tablets by mouth every 8 hours as needed for Pain or Fever, Disp: 30 tablet, Rfl: 0    nystatin (MYCOSTATIN) 906518 UNIT/GM powder, Apply topically 4 times daily below abdominal skin fold and groin area, Disp: 60 g, Rfl: 0    vitamin C (ASCORBIC ACID) 500 MG tablet, Take 1 tablet by mouth daily, Disp: ,

## 2024-11-25 ENCOUNTER — APPOINTMENT (OUTPATIENT)
Dept: GENERAL RADIOLOGY | Age: 74
End: 2024-11-25
Payer: MEDICARE

## 2024-11-25 ENCOUNTER — HOSPITAL ENCOUNTER (EMERGENCY)
Age: 74
Discharge: HOME | End: 2024-11-26
Attending: EMERGENCY MEDICINE
Payer: MEDICARE

## 2024-11-25 ENCOUNTER — APPOINTMENT (OUTPATIENT)
Dept: CT IMAGING | Age: 74
End: 2024-11-25
Payer: MEDICARE

## 2024-11-25 VITALS
DIASTOLIC BLOOD PRESSURE: 71 MMHG | TEMPERATURE: 98.5 F | HEIGHT: 63 IN | OXYGEN SATURATION: 98 % | RESPIRATION RATE: 18 BRPM | BODY MASS INDEX: 29.59 KG/M2 | WEIGHT: 167 LBS | SYSTOLIC BLOOD PRESSURE: 128 MMHG | HEART RATE: 82 BPM

## 2024-11-25 DIAGNOSIS — S40.012A CONTUSION OF LEFT SHOULDER, INITIAL ENCOUNTER: Primary | ICD-10-CM

## 2024-11-25 DIAGNOSIS — S80.02XA CONTUSION OF LEFT KNEE, INITIAL ENCOUNTER: ICD-10-CM

## 2024-11-25 DIAGNOSIS — S80.212A ABRASION, LEFT KNEE, INITIAL ENCOUNTER: ICD-10-CM

## 2024-11-25 PROCEDURE — 70450 CT HEAD/BRAIN W/O DYE: CPT

## 2024-11-25 PROCEDURE — 73010 X-RAY EXAM OF SHOULDER BLADE: CPT

## 2024-11-25 PROCEDURE — 73562 X-RAY EXAM OF KNEE 3: CPT

## 2024-11-25 PROCEDURE — 6370000000 HC RX 637 (ALT 250 FOR IP)

## 2024-11-25 PROCEDURE — 99284 EMERGENCY DEPT VISIT MOD MDM: CPT

## 2024-11-25 RX ORDER — OXYCODONE AND ACETAMINOPHEN 5; 325 MG/1; MG/1
1 TABLET ORAL ONCE
Status: COMPLETED | OUTPATIENT
Start: 2024-11-25 | End: 2024-11-25

## 2024-11-25 RX ADMIN — OXYCODONE HYDROCHLORIDE AND ACETAMINOPHEN 1 TABLET: 5; 325 TABLET ORAL at 22:25

## 2024-11-25 ASSESSMENT — PAIN - FUNCTIONAL ASSESSMENT: PAIN_FUNCTIONAL_ASSESSMENT: 0-10

## 2024-11-25 ASSESSMENT — PAIN SCALES - GENERAL: PAINLEVEL_OUTOF10: 8

## 2024-11-25 ASSESSMENT — PAIN DESCRIPTION - PAIN TYPE: TYPE: ACUTE PAIN

## 2024-11-26 NOTE — DISCHARGE INSTRUCTIONS
If given narcotics (opiates) or muscle relaxants during this Emergency Department visit, you should not drink, drive or operate any machinery for at least 6 hours.    Take your medication as indicated and prescribed.  For pain use acetaminophen (Tylenol) or ibuprofen (Motrin / Advil), unless prescribed medications that have acetaminophen or ibuprofen (or similar medications) in it.  You can take over the counter acetaminophen tablets (1 - 2 tablets of the 500-mg strength every 6 hours) or ibuprofen tablets (2 tablets every 4 hours).    Use an ice pack or bag filled with ice and apply to the injured area 3 - 4 times a day for 15 - 20 minutes each time.  If the injury is older than 3 days, then use a heating pad to help relax the muscles.    PLEASE RETURN TO THE EMERGENCY DEPARTMENT IMMEDIATELY for worsening of pain, decrease sensation to arms or legs, inability to move arms or legs, shortness of breath, severe chest pain, excessive nausea or vomiting, notice any bruising to your abdomen or have increase in abdominal pain, or if you develop any concerning symptoms such as: high fever not relieved by acetaminophen (Tylenol) and/or ibuprofen (Motrin / Advil), chills, feeling of your heart fluttering or racing, persistent nausea and/or vomiting, vomiting up blood, blood in your stool, loss of consciousness, numbness, weakness or tingling in the arms or legs or change in color of the extremities, changes in mental status, persistent headache, blurry vision, loss of bladder / bowel control, unable to follow up with your physician, or other any other care or concern.

## 2024-11-26 NOTE — ED PROVIDER NOTES
Xarelto.  CT head shows no acute process.  X-ray of the left knee shows a small suprapatellar knee joint effusion but no acute fracture or dislocation.  X-ray of the left scapula shows no acute fracture or dislocation.  She does have glenohumeral and acromioclavicular joint degenerative change.  The patient's pain improved with Percocet.  She was instructed to take ibuprofen or Tylenol as needed for pain at home and to apply ice packs for her injured areas for 20 minutes at a time.  She was given wound care instructions.  The patient was instructed to follow-up with her PCP within 2 to 3 days and to return to the ER for worsening symptoms or any other concern. The patient understands that at this time there is no evidence for a more malignant underlying process, but also understands that early in the process of an illness or injury, an emergency department work-up can be falsely reassuring.  Routine discharge counseling was given, and the patient understands that worsening, changing or persistent symptoms should prompt a immediate call or follow-up with their primary care physician or return to the emergency department.  The importance of appropriate follow-up was also discussed.  I have reviewed the disposition diagnosis with the patient.  I have answered their questions and given discharge instructions.  They voiced understanding of these instructions and did not have any further questions or complaints. They were updated on all incidental findings.    DIAGNOSIS:  1. Contusion of left shoulder, initial encounter    2. Contusion of left knee, initial encounter    3. Abrasion, left knee, initial encounter              (Please note that portions of this note were completed with a voice recognition program.  Efforts were made to edit the dictations but occasionally words are mis-transcribed.)    Yadira Chinchilla DO, FACEP  Attending Emergency Medicine Physician       Yadria Chinchilla DO  11/26/24 0354

## 2024-11-28 NOTE — ED PROVIDER NOTES
[facility] EMERGENCY DEPARTMENT  Emergency Department Encounter  Mid Level Provider     Pt Name: Deborah Nuñez  MRN: [id number]  Birthdate 1950  Date of evaluation: 11/28/24  PCP:  Romeo Montiel MD    CHIEF COMPLAINT       Chief Complaint   Patient presents with    Fall     Pt reports she lost her balance and fell onto her left side, having left upper back pain, left elbow pain, left knee and ankle pain. Denies hitting her head or loc    Back Pain    Leg Pain    Arm Pain       HISTORY OF PRESENT ILLNESS  (Location/Symptom, Timing/Onset,Context/Setting, Quality, Duration, Modifying Factors, Severity.)      Deborah Nuñez is a 74 y.o. female who presents with complaints of left sided upper back pain, left knee pain, left arm pain status post mechanical trip and fall.  She had gone into the room where her electric wheelchair was charging to see if it had charged when she went to exit the room she turned and tripped on her own feet and fell to the left side.  On evaluation she has some left scapular tenderness and mild swelling of the left knee with some tenderness diffusely of the joint no loss of range of motion or crepitus noted with passive or active range of motion just has pain.  She reports she did not strike her head that she can recall.  She does take Xarelto for her atrial fibrillation.  She had no precipitating factors that would warrant workup for syncope such as chest pain, shortness of breath, lightheadedness, dizziness, visual changes, palpitations, weakness, fatigue, numbness, tingling.     PAST MEDICAL /SURGICAL / SOCIAL / FAMILY HISTORY      has a past medical history of A-fib (HCC), Anxiety, Arthritis, Blood transfusion reaction, CAD (coronary artery disease), Carotid stenosis, COPD (chronic obstructive pulmonary disease) (HCC), GERD (gastroesophageal reflux disease), Hepatitis B, History of blood transfusion, Hyperlipidemia, Hypertension, Hypothyroid, Kidney disease, Legally  suspected or confirmed emergency medical condition->Emergency Medical Condition (MA) Reason for Exam: pt c/o fall from wheelchair, unsure if she hit head FINDINGS: BRAIN/VENTRICLES: There is no acute intracranial hemorrhage, mass effect or midline shift.  No abnormal extra-axial fluid collection.  The gray-white differentiation is maintained without evidence of an acute infarct.  There is no evidence of hydrocephalus. Moderate chronic microvascular disease is identified within the periventricular white matter ORBITS: The visualized portion of the orbits demonstrate no acute abnormality. SINUSES: The visualized paranasal sinuses and mastoid air cells demonstrate no acute abnormality. SOFT TISSUES/SKULL:  No acute abnormality of the visualized skull or soft tissues.     No acute intracranial abnormality.     XR SCAPULA LEFT (COMPLETE)    Result Date: 11/25/2024  EXAMINATION: TWO XRAY VIEWS OF THE LEFT SCAPULA 11/25/2024 10:08 pm COMPARISON: 01/16/2024 HISTORY: ORDERING SYSTEM PROVIDED HISTORY: fall TECHNOLOGIST PROVIDED HISTORY: fall Reason for Exam: pt c/o left shoulder pain, had fall tonight FINDINGS: Moderate degenerative changes are seen within the AC joint.  Glenohumeral joint degenerative change is identified.  No clavicular fracture.  No scapular fracture.  The left chest wall appears intact without acute osseous abnormality.  Degenerative change in the lower cervical and thoracic spine. There is a right innominate artery stent.     1. No acute fracture or dislocation is seen involving the left shoulder. 2. Glenohumeral and acromioclavicular joint degenerative change.     XR KNEE LEFT (3 VIEWS)    Result Date: 11/25/2024  EXAMINATION: THREE XRAY VIEWS OF THE LEFT KNEE 11/25/2024 10:07 pm COMPARISON: 04/01/2020 HISTORY: ORDERING SYSTEM PROVIDED HISTORY: fall TECHNOLOGIST PROVIDED HISTORY: fall Reason for Exam: pt c/o left knee pain, had fall tonight FINDINGS: No acute fracture or dislocation is identified.  The

## 2025-01-08 ENCOUNTER — APPOINTMENT (OUTPATIENT)
Dept: GENERAL RADIOLOGY | Age: 75
DRG: 071 | End: 2025-01-08
Payer: MEDICARE

## 2025-01-08 ENCOUNTER — HOSPITAL ENCOUNTER (INPATIENT)
Age: 75
LOS: 5 days | Discharge: HOME HEALTH | DRG: 071 | End: 2025-01-13
Attending: EMERGENCY MEDICINE | Admitting: STUDENT IN AN ORGANIZED HEALTH CARE EDUCATION/TRAINING PROGRAM
Payer: MEDICARE

## 2025-01-08 ENCOUNTER — APPOINTMENT (OUTPATIENT)
Dept: CT IMAGING | Age: 75
DRG: 071 | End: 2025-01-08
Payer: MEDICARE

## 2025-01-08 DIAGNOSIS — E87.6 HYPOKALEMIA: Primary | ICD-10-CM

## 2025-01-08 DIAGNOSIS — E03.9 HYPOTHYROIDISM, UNSPECIFIED TYPE: ICD-10-CM

## 2025-01-08 PROBLEM — R41.82 AMS (ALTERED MENTAL STATUS): Status: ACTIVE | Noted: 2025-01-08

## 2025-01-08 LAB
ALBUMIN SERPL-MCNC: 4.8 G/DL (ref 3.5–5.2)
ALBUMIN/GLOB SERPL: 1.5 {RATIO} (ref 1–2.5)
ALP SERPL-CCNC: 111 U/L (ref 35–104)
ALT SERPL-CCNC: 8 U/L (ref 5–33)
ANION GAP SERPL CALCULATED.3IONS-SCNC: 15 MMOL/L (ref 9–17)
AST SERPL-CCNC: 21 U/L
BACTERIA URNS QL MICRO: ABNORMAL
BASOPHILS # BLD: 0.1 K/UL (ref 0–0.2)
BASOPHILS NFR BLD: 1 % (ref 0–2)
BILIRUB SERPL-MCNC: 1.3 MG/DL (ref 0.3–1.2)
BILIRUB UR QL STRIP: NEGATIVE
BUN SERPL-MCNC: 11 MG/DL (ref 8–23)
CALCIUM SERPL-MCNC: 10.9 MG/DL (ref 8.6–10.4)
CASTS #/AREA URNS LPF: ABNORMAL /LPF
CASTS #/AREA URNS LPF: ABNORMAL /LPF
CHARACTER UR: ABNORMAL
CHLORIDE SERPL-SCNC: 94 MMOL/L (ref 98–107)
CLARITY UR: CLEAR
CO2 SERPL-SCNC: 31 MMOL/L (ref 20–31)
COLOR UR: YELLOW
CREAT SERPL-MCNC: 1 MG/DL (ref 0.5–0.9)
EOSINOPHIL # BLD: 0 K/UL (ref 0–0.4)
EOSINOPHILS RELATIVE PERCENT: 0 % (ref 1–4)
EPI CELLS #/AREA URNS HPF: ABNORMAL /HPF (ref 0–5)
ERYTHROCYTE [DISTWIDTH] IN BLOOD BY AUTOMATED COUNT: 14.6 % (ref 12.5–15.4)
GFR, ESTIMATED: 60 ML/MIN/1.73M2
GLUCOSE SERPL-MCNC: 138 MG/DL (ref 70–99)
GLUCOSE UR STRIP-MCNC: NEGATIVE MG/DL
HCT VFR BLD AUTO: 40.4 % (ref 36–46)
HGB BLD-MCNC: 13.4 G/DL (ref 12–16)
HGB UR QL STRIP.AUTO: NEGATIVE
KETONES UR STRIP-MCNC: NEGATIVE MG/DL
LACTATE BLDV-SCNC: 1.6 MMOL/L (ref 0.5–2.2)
LEUKOCYTE ESTERASE UR QL STRIP: NEGATIVE
LIPASE SERPL-CCNC: 20 U/L (ref 13–60)
LYMPHOCYTES NFR BLD: 1.7 K/UL (ref 1–4.8)
LYMPHOCYTES RELATIVE PERCENT: 22 % (ref 24–44)
MAGNESIUM SERPL-MCNC: 1.8 MG/DL (ref 1.6–2.6)
MCH RBC QN AUTO: 31 PG (ref 26–34)
MCHC RBC AUTO-ENTMCNC: 33.1 G/DL (ref 31–37)
MCV RBC AUTO: 93.6 FL (ref 80–100)
MONOCYTES NFR BLD: 0.4 K/UL (ref 0.1–1.2)
MONOCYTES NFR BLD: 6 % (ref 2–11)
NEUTROPHILS NFR BLD: 71 % (ref 36–66)
NEUTS SEG NFR BLD: 5.5 K/UL (ref 1.8–7.7)
NITRITE UR QL STRIP: NEGATIVE
PH UR STRIP: 6 [PH] (ref 5–8)
PLATELET # BLD AUTO: 404 K/UL (ref 140–450)
PMV BLD AUTO: 8.3 FL (ref 6–12)
POTASSIUM SERPL-SCNC: 2.2 MMOL/L (ref 3.7–5.3)
POTASSIUM SERPL-SCNC: 2.9 MMOL/L (ref 3.7–5.3)
PROT SERPL-MCNC: 8.1 G/DL (ref 6.4–8.3)
PROT UR STRIP-MCNC: ABNORMAL MG/DL
RBC # BLD AUTO: 4.32 M/UL (ref 4–5.2)
RBC #/AREA URNS HPF: ABNORMAL /HPF (ref 0–2)
SODIUM SERPL-SCNC: 140 MMOL/L (ref 135–144)
SP GR UR STRIP: 1.02 (ref 1–1.03)
T4 FREE SERPL-MCNC: 0.3 NG/DL (ref 0.9–1.7)
TROPONIN I SERPL HS-MCNC: 54 NG/L (ref 0–14)
TROPONIN I SERPL HS-MCNC: 55 NG/L (ref 0–14)
TSH SERPL DL<=0.05 MIU/L-ACNC: 19.72 UIU/ML (ref 0.3–5)
UROBILINOGEN UR STRIP-ACNC: NORMAL EU/DL (ref 0–1)
WBC #/AREA URNS HPF: ABNORMAL /HPF (ref 0–5)
WBC OTHER # BLD: 7.7 K/UL (ref 3.5–11)

## 2025-01-08 PROCEDURE — 1210000000 HC MED SURG R&B

## 2025-01-08 PROCEDURE — 36415 COLL VENOUS BLD VENIPUNCTURE: CPT

## 2025-01-08 PROCEDURE — 6370000000 HC RX 637 (ALT 250 FOR IP): Performed by: NURSE PRACTITIONER

## 2025-01-08 PROCEDURE — 93005 ELECTROCARDIOGRAM TRACING: CPT | Performed by: NURSE PRACTITIONER

## 2025-01-08 PROCEDURE — 2700000000 HC OXYGEN THERAPY PER DAY

## 2025-01-08 PROCEDURE — 85025 COMPLETE CBC W/AUTO DIFF WBC: CPT

## 2025-01-08 PROCEDURE — 96374 THER/PROPH/DIAG INJ IV PUSH: CPT

## 2025-01-08 PROCEDURE — 84132 ASSAY OF SERUM POTASSIUM: CPT

## 2025-01-08 PROCEDURE — 83605 ASSAY OF LACTIC ACID: CPT

## 2025-01-08 PROCEDURE — 99223 1ST HOSP IP/OBS HIGH 75: CPT | Performed by: STUDENT IN AN ORGANIZED HEALTH CARE EDUCATION/TRAINING PROGRAM

## 2025-01-08 PROCEDURE — 2580000003 HC RX 258: Performed by: NURSE PRACTITIONER

## 2025-01-08 PROCEDURE — 99285 EMERGENCY DEPT VISIT HI MDM: CPT

## 2025-01-08 PROCEDURE — 70450 CT HEAD/BRAIN W/O DYE: CPT

## 2025-01-08 PROCEDURE — 84443 ASSAY THYROID STIM HORMONE: CPT

## 2025-01-08 PROCEDURE — 6370000000 HC RX 637 (ALT 250 FOR IP): Performed by: STUDENT IN AN ORGANIZED HEALTH CARE EDUCATION/TRAINING PROGRAM

## 2025-01-08 PROCEDURE — 2500000003 HC RX 250 WO HCPCS: Performed by: NURSE PRACTITIONER

## 2025-01-08 PROCEDURE — 6360000002 HC RX W HCPCS: Performed by: NURSE PRACTITIONER

## 2025-01-08 PROCEDURE — 71045 X-RAY EXAM CHEST 1 VIEW: CPT

## 2025-01-08 PROCEDURE — 83690 ASSAY OF LIPASE: CPT

## 2025-01-08 PROCEDURE — 94760 N-INVAS EAR/PLS OXIMETRY 1: CPT

## 2025-01-08 PROCEDURE — 81001 URINALYSIS AUTO W/SCOPE: CPT

## 2025-01-08 PROCEDURE — 80053 COMPREHEN METABOLIC PANEL: CPT

## 2025-01-08 PROCEDURE — 83735 ASSAY OF MAGNESIUM: CPT

## 2025-01-08 PROCEDURE — 84439 ASSAY OF FREE THYROXINE: CPT

## 2025-01-08 PROCEDURE — 84484 ASSAY OF TROPONIN QUANT: CPT

## 2025-01-08 RX ORDER — FOLIC ACID 1 MG/1
1 TABLET ORAL DAILY
Status: DISCONTINUED | OUTPATIENT
Start: 2025-01-08 | End: 2025-01-13 | Stop reason: HOSPADM

## 2025-01-08 RX ORDER — ACETAMINOPHEN 325 MG/1
650 TABLET ORAL EVERY 6 HOURS PRN
Status: DISCONTINUED | OUTPATIENT
Start: 2025-01-08 | End: 2025-01-13 | Stop reason: HOSPADM

## 2025-01-08 RX ORDER — BENZTROPINE MESYLATE 1 MG/1
0.5 TABLET ORAL 2 TIMES DAILY
Status: DISCONTINUED | OUTPATIENT
Start: 2025-01-08 | End: 2025-01-13 | Stop reason: HOSPADM

## 2025-01-08 RX ORDER — POTASSIUM CHLORIDE 7.45 MG/ML
10 INJECTION INTRAVENOUS PRN
Status: DISCONTINUED | OUTPATIENT
Start: 2025-01-08 | End: 2025-01-13 | Stop reason: HOSPADM

## 2025-01-08 RX ORDER — POTASSIUM CHLORIDE 1500 MG/1
40 TABLET, EXTENDED RELEASE ORAL PRN
Status: DISCONTINUED | OUTPATIENT
Start: 2025-01-08 | End: 2025-01-13 | Stop reason: HOSPADM

## 2025-01-08 RX ORDER — ROFLUMILAST 500 UG/1
500 TABLET ORAL DAILY
Status: DISCONTINUED | OUTPATIENT
Start: 2025-01-08 | End: 2025-01-13 | Stop reason: HOSPADM

## 2025-01-08 RX ORDER — ALPRAZOLAM 0.5 MG
1 TABLET ORAL NIGHTLY PRN
Status: DISCONTINUED | OUTPATIENT
Start: 2025-01-08 | End: 2025-01-13 | Stop reason: HOSPADM

## 2025-01-08 RX ORDER — PANTOPRAZOLE SODIUM 40 MG/1
40 TABLET, DELAYED RELEASE ORAL
Status: DISCONTINUED | OUTPATIENT
Start: 2025-01-09 | End: 2025-01-13 | Stop reason: HOSPADM

## 2025-01-08 RX ORDER — METOPROLOL TARTRATE 25 MG/1
25 TABLET, FILM COATED ORAL 2 TIMES DAILY
Status: DISCONTINUED | OUTPATIENT
Start: 2025-01-08 | End: 2025-01-13 | Stop reason: HOSPADM

## 2025-01-08 RX ORDER — MAGNESIUM SULFATE 1 G/100ML
1000 INJECTION INTRAVENOUS PRN
Status: DISCONTINUED | OUTPATIENT
Start: 2025-01-08 | End: 2025-01-13 | Stop reason: HOSPADM

## 2025-01-08 RX ORDER — ROSUVASTATIN CALCIUM 5 MG/1
10 TABLET, COATED ORAL DAILY
Status: DISCONTINUED | OUTPATIENT
Start: 2025-01-08 | End: 2025-01-13 | Stop reason: HOSPADM

## 2025-01-08 RX ORDER — FERROUS SULFATE 325(65) MG
325 TABLET, DELAYED RELEASE (ENTERIC COATED) ORAL DAILY
Status: DISCONTINUED | OUTPATIENT
Start: 2025-01-08 | End: 2025-01-13 | Stop reason: HOSPADM

## 2025-01-08 RX ORDER — ONDANSETRON 2 MG/ML
4 INJECTION INTRAMUSCULAR; INTRAVENOUS EVERY 6 HOURS PRN
Status: DISCONTINUED | OUTPATIENT
Start: 2025-01-08 | End: 2025-01-13 | Stop reason: HOSPADM

## 2025-01-08 RX ORDER — SODIUM CHLORIDE 0.9 % (FLUSH) 0.9 %
5-40 SYRINGE (ML) INJECTION EVERY 12 HOURS SCHEDULED
Status: DISCONTINUED | OUTPATIENT
Start: 2025-01-08 | End: 2025-01-13 | Stop reason: HOSPADM

## 2025-01-08 RX ORDER — ACETAMINOPHEN 650 MG/1
650 SUPPOSITORY RECTAL EVERY 6 HOURS PRN
Status: DISCONTINUED | OUTPATIENT
Start: 2025-01-08 | End: 2025-01-13 | Stop reason: HOSPADM

## 2025-01-08 RX ORDER — POLYETHYLENE GLYCOL 3350 17 G/17G
17 POWDER, FOR SOLUTION ORAL DAILY PRN
Status: DISCONTINUED | OUTPATIENT
Start: 2025-01-08 | End: 2025-01-13 | Stop reason: HOSPADM

## 2025-01-08 RX ORDER — LEVOTHYROXINE SODIUM 50 UG/1
50 TABLET ORAL DAILY
Status: DISCONTINUED | OUTPATIENT
Start: 2025-01-09 | End: 2025-01-13 | Stop reason: HOSPADM

## 2025-01-08 RX ORDER — PREGABALIN 75 MG/1
150 CAPSULE ORAL 2 TIMES DAILY
Status: DISCONTINUED | OUTPATIENT
Start: 2025-01-08 | End: 2025-01-13 | Stop reason: HOSPADM

## 2025-01-08 RX ORDER — SODIUM CHLORIDE 9 MG/ML
INJECTION, SOLUTION INTRAVENOUS CONTINUOUS
Status: DISCONTINUED | OUTPATIENT
Start: 2025-01-08 | End: 2025-01-10

## 2025-01-08 RX ORDER — VITAMIN B COMPLEX
2000 TABLET ORAL DAILY
Status: DISCONTINUED | OUTPATIENT
Start: 2025-01-08 | End: 2025-01-13 | Stop reason: HOSPADM

## 2025-01-08 RX ORDER — HYDRALAZINE HYDROCHLORIDE 20 MG/ML
10 INJECTION INTRAMUSCULAR; INTRAVENOUS ONCE
Status: COMPLETED | OUTPATIENT
Start: 2025-01-08 | End: 2025-01-08

## 2025-01-08 RX ORDER — METOPROLOL TARTRATE 1 MG/ML
5 INJECTION, SOLUTION INTRAVENOUS ONCE
Status: COMPLETED | OUTPATIENT
Start: 2025-01-08 | End: 2025-01-08

## 2025-01-08 RX ORDER — SODIUM CHLORIDE 0.9 % (FLUSH) 0.9 %
10 SYRINGE (ML) INJECTION PRN
Status: DISCONTINUED | OUTPATIENT
Start: 2025-01-08 | End: 2025-01-13 | Stop reason: HOSPADM

## 2025-01-08 RX ORDER — ONDANSETRON 4 MG/1
4 TABLET, ORALLY DISINTEGRATING ORAL EVERY 8 HOURS PRN
Status: DISCONTINUED | OUTPATIENT
Start: 2025-01-08 | End: 2025-01-13 | Stop reason: HOSPADM

## 2025-01-08 RX ORDER — POTASSIUM CHLORIDE 7.45 MG/ML
10 INJECTION INTRAVENOUS
Status: COMPLETED | OUTPATIENT
Start: 2025-01-08 | End: 2025-01-08

## 2025-01-08 RX ORDER — SODIUM CHLORIDE 9 MG/ML
INJECTION, SOLUTION INTRAVENOUS PRN
Status: DISCONTINUED | OUTPATIENT
Start: 2025-01-08 | End: 2025-01-13 | Stop reason: HOSPADM

## 2025-01-08 RX ORDER — LABETALOL HYDROCHLORIDE 5 MG/ML
10 INJECTION, SOLUTION INTRAVENOUS ONCE
Status: DISCONTINUED | OUTPATIENT
Start: 2025-01-08 | End: 2025-01-08

## 2025-01-08 RX ORDER — ASCORBIC ACID 500 MG
500 TABLET ORAL DAILY
Status: DISCONTINUED | OUTPATIENT
Start: 2025-01-08 | End: 2025-01-13 | Stop reason: HOSPADM

## 2025-01-08 RX ADMIN — Medication 10 MEQ: at 14:58

## 2025-01-08 RX ADMIN — Medication 500 MG: at 20:55

## 2025-01-08 RX ADMIN — Medication 10 MEQ: at 17:20

## 2025-01-08 RX ADMIN — Medication 2000 UNITS: at 20:55

## 2025-01-08 RX ADMIN — HYDRALAZINE HYDROCHLORIDE 10 MG: 20 INJECTION INTRAMUSCULAR; INTRAVENOUS at 18:58

## 2025-01-08 RX ADMIN — Medication 10 MEQ: at 19:07

## 2025-01-08 RX ADMIN — SODIUM CHLORIDE: 9 INJECTION, SOLUTION INTRAVENOUS at 20:56

## 2025-01-08 RX ADMIN — FERROUS SULFATE TAB EC 325 MG (65 MG FE EQUIVALENT) 325 MG: 325 (65 FE) TABLET DELAYED RESPONSE at 20:55

## 2025-01-08 RX ADMIN — METOPROLOL TARTRATE 5 MG: 5 INJECTION INTRAVENOUS at 17:10

## 2025-01-08 RX ADMIN — ROFLUMILAST 500 MCG: 500 TABLET ORAL at 20:55

## 2025-01-08 RX ADMIN — ROSUVASTATIN CALCIUM 10 MG: 5 TABLET, FILM COATED ORAL at 20:55

## 2025-01-08 RX ADMIN — SODIUM CHLORIDE: 9 INJECTION, SOLUTION INTRAVENOUS at 14:58

## 2025-01-08 RX ADMIN — Medication 10 MEQ: at 16:14

## 2025-01-08 RX ADMIN — POTASSIUM BICARBONATE 40 MEQ: 782 TABLET, EFFERVESCENT ORAL at 14:50

## 2025-01-08 RX ADMIN — PREGABALIN 150 MG: 75 CAPSULE ORAL at 20:55

## 2025-01-08 RX ADMIN — FOLIC ACID 1 MG: 1 TABLET ORAL at 20:56

## 2025-01-08 RX ADMIN — RIVAROXABAN 20 MG: 10 TABLET, FILM COATED ORAL at 20:55

## 2025-01-08 RX ADMIN — METOPROLOL TARTRATE 25 MG: 25 TABLET, FILM COATED ORAL at 20:56

## 2025-01-08 ASSESSMENT — PAIN - FUNCTIONAL ASSESSMENT: PAIN_FUNCTIONAL_ASSESSMENT: NONE - DENIES PAIN

## 2025-01-08 NOTE — ED NOTES
ED to inpatient nurses report      Chief Complaint:  Chief Complaint   Patient presents with    Altered Mental Status     Pt arrives via ems from home dt co altered mental status per grandson. Per grandson pt does have hx of utis. Per ems pt was delayed on responses with no decifiets for stroke scale. Per ems pt does have prescription for oxycodone at home which were filled 12/10 with most pills still in bottle. Pt was hypertensive en route with afib on monitor pt does have history.      Present to ED from: home, lives by herself.     MOA:     LOC: Alert and oriented x4, but very forgetful and [place]  Mobility: requires 1 assist.   Oxygen Baseline: 96% RA    Current needs required: none   Pending ED orders: IV potassium  Present condition: stable    Why did the patient come to the ED? AMS  What is the plan? Admission  Any procedures or intervention occur? cxr  Any safety concerns?? Yes patient is very forgetful. Unsteady on her feet, will need one assist.   CODE STATUS Prior  Diet No diet orders on file    Mental Status:       Psych Assessment:      Vital signs   Vitals:    01/08/25 1310 01/08/25 1450 01/08/25 1550 01/08/25 1552   BP: (!) 202/79 (!) 164/93 (!) 177/98    Pulse:  98 (!) 103 100   Resp:  11 23 27   Temp:       SpO2: 96% 97% 94% 96%   Weight:       Height:            Vitals:  Patient Vitals for the past 24 hrs:   BP Temp Pulse Resp SpO2 Height Weight   01/08/25 1552 -- -- 100 27 96 % -- --   01/08/25 1550 (!) 177/98 -- (!) 103 23 94 % -- --   01/08/25 1450 (!) 164/93 -- 98 11 97 % -- --   01/08/25 1310 (!) 202/79 -- -- -- 96 % -- --   01/08/25 1258 (!) 192/97 98.2 °F (36.8 °C) 97 14 98 % 1.6 m (5' 3\") 75.8 kg (167 lb)      Visit Vitals  BP (!) 177/98   Pulse 100   Temp 98.2 °F (36.8 °C)   Resp 27   Ht 1.6 m (5' 3\")   Wt 75.8 kg (167 lb)   SpO2 96%   BMI 29.58 kg/m²        LDAs:   Peripheral IV 01/08/25 Right Antecubital (Active)   Site Assessment Clean, dry & intact 01/08/25 1355   Phlebitis Assessment No

## 2025-01-08 NOTE — ED PROVIDER NOTES
[address]  EMERGENCY DEPARTMENT ENCOUNTER      Pt Name: Deborah Nuñez  MRN: [id number]  Birthdate 1950  Date of evaluation: 1/8/2025  Provider: RAYMUNDO Lee CNP  4:26 PM    CHIEF COMPLAINT       Chief Complaint   Patient presents with    Altered Mental Status     Pt arrives via ems from home dt co altered mental status per grandson. Per grandson pt does have hx of utis. Per ems pt was delayed on responses with no decifiets for stroke scale. Per ems pt does have prescription for oxycodone at home which were filled 12/10 with most pills still in bottle. Pt was hypertensive en route with afib on monitor pt does have history.          HISTORY OF PRESENT ILLNESS    Deborah Nuñez is a 74 y.o. female who presents to the emergency department for evaluation of altered mental status.  According to the grandson she has a history of UTIs.  EMS states that she was delayed on her responses.  They were concerned for possible narcotic overdose does have a prescription for oxycodone at home.  She was hypertensive, atrial fibrillation noted on monitor.  Patient is rather noncontributory to her history.  She is able to tell me that she is in the hospital.  I asked her if she is taking her medications.  She does not know.  She is able to follow commands but very confused when I ask her simple questions.  She does live alone.  She denies any falls.  No headache no chest pain no shortness of breath no vision changes no numbness tingling weakness    HPI    Nursing Notes were reviewed.    REVIEW OF SYSTEMS       Review of Systems   All other systems reviewed and are negative.      Except as noted above the remainder of the review of systems was reviewed and negative.       PAST MEDICAL HISTORY     Past Medical History:   Diagnosis Date    A-fib (HCC)     Anxiety     Arthritis     Blood transfusion reaction     2001 with surgery    CAD (coronary artery disease)     Carotid stenosis     COPD (chronic obstructive pulmonary  normal limits   TSH - Abnormal; Notable for the following components:    TSH 19.72 (*)     All other components within normal limits   T4, FREE - Abnormal; Notable for the following components:    T4 Free 0.3 (*)     All other components within normal limits   PROTIME-INR - Abnormal; Notable for the following components:    Protime 14.3 (*)     All other components within normal limits   BASIC METABOLIC PANEL W/ REFLEX TO MG FOR LOW K - Abnormal; Notable for the following components:    Potassium 3.3 (*)     Glucose 109 (*)     All other components within normal limits   CBC WITH AUTO DIFFERENTIAL - Abnormal; Notable for the following components:    RBC 3.70 (*)     Hemoglobin 11.8 (*)     Hematocrit 34.9 (*)     Eosinophils % 0 (*)     All other components within normal limits   POTASSIUM - Abnormal; Notable for the following components:    Potassium 2.9 (*)     All other components within normal limits   MAGNESIUM - Abnormal; Notable for the following components:    Magnesium 1.5 (*)     All other components within normal limits   LIPASE   LACTIC ACID   MAGNESIUM       All other labs were within normal range or not returned as of this dictation.    EMERGENCY DEPARTMENT COURSE and DIFFERENTIAL DIAGNOSIS/MDM:   Vitals:    Vitals:    01/09/25 0900 01/09/25 0918 01/09/25 1259 01/09/25 1609   BP:  (!) 143/99 133/69 (!) 151/67   Pulse: (!) 125 100 78 91   Resp:       Temp:  98.8 °F (37.1 °C) 99.3 °F (37.4 °C) 99.3 °F (37.4 °C)   TempSrc:  Oral Oral Oral   SpO2:  94% 95% 95%   Weight:       Height:               Medical Decision Making  Amount and/or Complexity of Data Reviewed  Labs: ordered.  Radiology: ordered.  ECG/medicine tests: ordered.    Risk  Prescription drug management.  Decision regarding hospitalization.      Patient presents for evaluation of altered mental status.  She is alert to time but confused about the situation.  She gives me very perplexed answers when I ask her certain questions especially about

## 2025-01-08 NOTE — H&P
[facility]  Office: [phone]  Justin Ball DO, Demarcus Dunne DO, Marcello Benítez DO, Farshad Vasques DO, Ragini De Dios MD, Stephany Curry MD, Eliel Orozco MD, Yue Ulrich MD,  Angel Nettles MD, Flores Martínez MD, Waleska Nj MD,  Aida Banda DO, Raúl Garcia MD, Adams Ratliff MD, Berhane Ball DO, Ruby Ferrari MD,  Romeo Box DO, Concepción Mojica MD, Nevaeh Valencia MD, Sallie Galindo MD, Nay Gutierrez MD,  Ibrahima Scott MD, Venkat Monterroso MD, Wilton Jara MD, Qi Mcconnell MD, Sami Coley MD, Abdifatah Rivera MD, Mario King DO, Orlando Fong MD, Aida Rajput MD, Mohsin Reza, MD, Shirley Waterhouse, CNP,  Dona Bernabe CNP, Mario Glynn, CNP,  Rehana Green, JENNIFER, Jessica Mendoza, CNP, Ama Hauser, CNP, Capri Schmidt, CNP, Fang Hidalgo, CNP, Beverley Galvan, PA-C, Yareli Abrams PA-C, Lori Kim, CNP, Froylan Deng, CNP,  Toya Kohli, CNP, Janiya Grayson, CNP, Ame Dsouza, CNP,  Maida Michaels, CNP, Armida Farris, CNP         [facility]   IN-PATIENT SERVICE   [facility]    HISTORY AND PHYSICAL EXAMINATION            Date:   1/8/2025  Patient name:  Deborah Nuñez  Date of admission:  1/8/2025 12:55 PM  MRN:   [id number]  Account:  [id number]  YOB: 1950  PCP:    Romeo Montiel MD  Room:   1TRAUMA/1TRAUMA  Code Status:    Prior    Chief Complaint:     Chief Complaint   Patient presents with    Altered Mental Status     Pt arrives via ems from home dt co altered mental status per grandson. Per grandson pt does have hx of utis. Per ems pt was delayed on responses with no decifiets for stroke scale. Per ems pt does have prescription for oxycodone at home which were filled 12/10 with most pills still in bottle. Pt was hypertensive en route with afib on monitor pt does have history.      History Obtained From:     electronic medical record    History of Present Illness:     Deborah Nuñez is a 74 y.o. female  Time: 01/08/25  1:23 PM   Result Value Ref Range    Ventricular Rate 162 BPM    Atrial Rate 80 BPM    QRS Duration 70 ms    Q-T Interval 220 ms    QTc Calculation (Bazett) 361 ms    R Axis 21 degrees    T Axis -40 degrees   CBC with Auto Differential    Collection Time: 01/08/25  1:32 PM   Result Value Ref Range    WBC 7.7 3.5 - 11.0 k/uL    RBC 4.32 4.0 - 5.2 m/uL    Hemoglobin 13.4 12.0 - 16.0 g/dL    Hematocrit 40.4 36 - 46 %    MCV 93.6 80 - 100 fL    MCH 31.0 26 - 34 pg    MCHC 33.1 31 - 37 g/dL    RDW 14.6 12.5 - 15.4 %    Platelets 404 140 - 450 k/uL    MPV 8.3 6.0 - 12.0 fL    Neutrophils % 71 (H) 36 - 66 %    Lymphocytes % 22 (L) 24 - 44 %    Monocytes % 6 2 - 11 %    Eosinophils % 0 (L) 1 - 4 %    Basophils % 1 0 - 2 %    Neutrophils Absolute 5.50 1.8 - 7.7 k/uL    Lymphocytes Absolute 1.70 1.0 - 4.8 k/uL    Monocytes Absolute 0.40 0.1 - 1.2 k/uL    Eosinophils Absolute 0.00 0.0 - 0.4 k/uL    Basophils Absolute 0.10 0.0 - 0.2 k/uL   CMP    Collection Time: 01/08/25  1:32 PM   Result Value Ref Range    Sodium 140 135 - 144 mmol/L    Potassium 2.2 (LL) 3.7 - 5.3 mmol/L    Chloride 94 (L) 98 - 107 mmol/L    CO2 31 20 - 31 mmol/L    Anion Gap 15 9 - 17 mmol/L    Glucose 138 (H) 70 - 99 mg/dL    BUN 11 8 - 23 mg/dL    Creatinine 1.0 (H) 0.5 - 0.9 mg/dL    Est, Glom Filt Rate 60 (L) >60 mL/min/1.73m2    Calcium 10.9 (H) 8.6 - 10.4 mg/dL    Total Protein 8.1 6.4 - 8.3 g/dL    Albumin 4.8 3.5 - 5.2 g/dL    Albumin/Globulin Ratio 1.5 1.0 - 2.5    Total Bilirubin 1.3 (H) 0.3 - 1.2 mg/dL    Alkaline Phosphatase 111 (H) 35 - 104 U/L    ALT 8 5 - 33 U/L    AST 21 <32 U/L   Lipase    Collection Time: 01/08/25  1:32 PM   Result Value Ref Range    Lipase 20 13 - 60 U/L   Lactic Acid    Collection Time: 01/08/25  1:32 PM   Result Value Ref Range    Lactic Acid 1.6 0.5 - 2.2 mmol/L   Troponin    Collection Time: 01/08/25  1:32 PM   Result Value Ref Range    Troponin, High Sensitivity 55 (HH) 0 - 14 ng/L   Magnesium

## 2025-01-08 NOTE — ED PROVIDER NOTES
Attending Supervisory Note/Shared Visit   I have personally performed a face to face diagnostic evaluation on this patient. I have reviewed the mid-level’s findings and agree.        (Please note that portions of this note were completed with a voice recognition program.  Efforts were made to edit the dictations but occasionally words are mis-transcribed.)    Avtar Almendarez MD  Attending Emergency Physician         Avtar Almendarez MD  01/08/25 3481

## 2025-01-08 NOTE — ED NOTES
Spoke with Daughter, Erin, who states her and her sister Consuelo, are POA's for patient. Erin stated patient has had increased weakness, using briefs at home, having urinary incontinence and bowel incontinence in the last couple of weeks. Erin stated she is very concerned, due to patient \"not being able to care for herself at home\". Writer spoke with Nafisa SHEA at this time about families concern.

## 2025-01-09 LAB
ANION GAP SERPL CALCULATED.3IONS-SCNC: 11 MMOL/L (ref 9–17)
BASOPHILS # BLD: 0.1 K/UL (ref 0–0.2)
BASOPHILS NFR BLD: 1 % (ref 0–2)
BUN SERPL-MCNC: 8 MG/DL (ref 8–23)
CALCIUM SERPL-MCNC: 9.2 MG/DL (ref 8.6–10.4)
CHLORIDE SERPL-SCNC: 99 MMOL/L (ref 98–107)
CO2 SERPL-SCNC: 29 MMOL/L (ref 20–31)
CREAT SERPL-MCNC: 0.9 MG/DL (ref 0.5–0.9)
EOSINOPHIL # BLD: 0 K/UL (ref 0–0.4)
EOSINOPHILS RELATIVE PERCENT: 0 % (ref 1–4)
ERYTHROCYTE [DISTWIDTH] IN BLOOD BY AUTOMATED COUNT: 14.8 % (ref 12.5–15.4)
GFR, ESTIMATED: 68 ML/MIN/1.73M2
GLUCOSE SERPL-MCNC: 109 MG/DL (ref 70–99)
HCT VFR BLD AUTO: 34.9 % (ref 36–46)
HGB BLD-MCNC: 11.8 G/DL (ref 12–16)
INR PPP: 1.4
LYMPHOCYTES NFR BLD: 2.2 K/UL (ref 1–4.8)
LYMPHOCYTES RELATIVE PERCENT: 28 % (ref 24–44)
MAGNESIUM SERPL-MCNC: 1.5 MG/DL (ref 1.6–2.6)
MCH RBC QN AUTO: 31.9 PG (ref 26–34)
MCHC RBC AUTO-ENTMCNC: 33.8 G/DL (ref 31–37)
MCV RBC AUTO: 94.3 FL (ref 80–100)
MONOCYTES NFR BLD: 0.5 K/UL (ref 0.1–1.2)
MONOCYTES NFR BLD: 6 % (ref 2–11)
NEUTROPHILS NFR BLD: 65 % (ref 36–66)
NEUTS SEG NFR BLD: 5.3 K/UL (ref 1.8–7.7)
PLATELET # BLD AUTO: 373 K/UL (ref 140–450)
PMV BLD AUTO: 8.4 FL (ref 6–12)
POTASSIUM SERPL-SCNC: 3.3 MMOL/L (ref 3.7–5.3)
PROTHROMBIN TIME: 14.3 SEC (ref 9.4–12.6)
RBC # BLD AUTO: 3.7 M/UL (ref 4–5.2)
SODIUM SERPL-SCNC: 139 MMOL/L (ref 135–144)
WBC OTHER # BLD: 8.1 K/UL (ref 3.5–11)

## 2025-01-09 PROCEDURE — 85025 COMPLETE CBC W/AUTO DIFF WBC: CPT

## 2025-01-09 PROCEDURE — 6360000002 HC RX W HCPCS: Performed by: STUDENT IN AN ORGANIZED HEALTH CARE EDUCATION/TRAINING PROGRAM

## 2025-01-09 PROCEDURE — 6370000000 HC RX 637 (ALT 250 FOR IP): Performed by: STUDENT IN AN ORGANIZED HEALTH CARE EDUCATION/TRAINING PROGRAM

## 2025-01-09 PROCEDURE — 1210000000 HC MED SURG R&B

## 2025-01-09 PROCEDURE — 97166 OT EVAL MOD COMPLEX 45 MIN: CPT

## 2025-01-09 PROCEDURE — 97535 SELF CARE MNGMENT TRAINING: CPT

## 2025-01-09 PROCEDURE — 36415 COLL VENOUS BLD VENIPUNCTURE: CPT

## 2025-01-09 PROCEDURE — 80048 BASIC METABOLIC PNL TOTAL CA: CPT

## 2025-01-09 PROCEDURE — 2500000003 HC RX 250 WO HCPCS: Performed by: STUDENT IN AN ORGANIZED HEALTH CARE EDUCATION/TRAINING PROGRAM

## 2025-01-09 PROCEDURE — 97116 GAIT TRAINING THERAPY: CPT

## 2025-01-09 PROCEDURE — 99232 SBSQ HOSP IP/OBS MODERATE 35: CPT | Performed by: STUDENT IN AN ORGANIZED HEALTH CARE EDUCATION/TRAINING PROGRAM

## 2025-01-09 PROCEDURE — 2580000003 HC RX 258: Performed by: NURSE PRACTITIONER

## 2025-01-09 PROCEDURE — 85610 PROTHROMBIN TIME: CPT

## 2025-01-09 PROCEDURE — 83735 ASSAY OF MAGNESIUM: CPT

## 2025-01-09 PROCEDURE — 97162 PT EVAL MOD COMPLEX 30 MIN: CPT

## 2025-01-09 RX ORDER — TRAMADOL HYDROCHLORIDE 50 MG/1
50 TABLET ORAL EVERY 8 HOURS PRN
Status: DISCONTINUED | OUTPATIENT
Start: 2025-01-09 | End: 2025-01-13 | Stop reason: HOSPADM

## 2025-01-09 RX ADMIN — TRAMADOL HYDROCHLORIDE 50 MG: 50 TABLET, COATED ORAL at 14:45

## 2025-01-09 RX ADMIN — PANTOPRAZOLE SODIUM 40 MG: 40 TABLET, DELAYED RELEASE ORAL at 09:10

## 2025-01-09 RX ADMIN — ROFLUMILAST 500 MCG: 500 TABLET ORAL at 09:09

## 2025-01-09 RX ADMIN — POTASSIUM CHLORIDE 10 MEQ: 7.46 INJECTION, SOLUTION INTRAVENOUS at 01:00

## 2025-01-09 RX ADMIN — Medication 2000 UNITS: at 09:09

## 2025-01-09 RX ADMIN — POTASSIUM CHLORIDE 10 MEQ: 7.46 INJECTION, SOLUTION INTRAVENOUS at 02:03

## 2025-01-09 RX ADMIN — METOPROLOL TARTRATE 25 MG: 25 TABLET, FILM COATED ORAL at 09:10

## 2025-01-09 RX ADMIN — SODIUM CHLORIDE, PRESERVATIVE FREE 10 ML: 5 INJECTION INTRAVENOUS at 20:48

## 2025-01-09 RX ADMIN — LEVOTHYROXINE SODIUM 50 MCG: 50 TABLET ORAL at 09:10

## 2025-01-09 RX ADMIN — METOPROLOL TARTRATE 25 MG: 25 TABLET, FILM COATED ORAL at 20:48

## 2025-01-09 RX ADMIN — FOLIC ACID 1 MG: 1 TABLET ORAL at 09:10

## 2025-01-09 RX ADMIN — RIVAROXABAN 20 MG: 10 TABLET, FILM COATED ORAL at 09:10

## 2025-01-09 RX ADMIN — ROSUVASTATIN CALCIUM 10 MG: 5 TABLET, FILM COATED ORAL at 09:10

## 2025-01-09 RX ADMIN — BENZTROPINE MESYLATE 0.5 MG: 1 TABLET ORAL at 09:10

## 2025-01-09 RX ADMIN — BENZTROPINE MESYLATE 0.5 MG: 1 TABLET ORAL at 20:48

## 2025-01-09 RX ADMIN — PREGABALIN 150 MG: 75 CAPSULE ORAL at 09:10

## 2025-01-09 RX ADMIN — SODIUM CHLORIDE: 9 INJECTION, SOLUTION INTRAVENOUS at 11:38

## 2025-01-09 RX ADMIN — Medication 500 MG: at 09:10

## 2025-01-09 RX ADMIN — MAGNESIUM SULFATE HEPTAHYDRATE 1000 MG: 1 INJECTION, SOLUTION INTRAVENOUS at 17:11

## 2025-01-09 RX ADMIN — FERROUS SULFATE TAB EC 325 MG (65 MG FE EQUIVALENT) 325 MG: 325 (65 FE) TABLET DELAYED RESPONSE at 09:10

## 2025-01-09 RX ADMIN — MAGNESIUM SULFATE HEPTAHYDRATE 1000 MG: 1 INJECTION, SOLUTION INTRAVENOUS at 09:21

## 2025-01-09 RX ADMIN — SODIUM CHLORIDE: 9 INJECTION, SOLUTION INTRAVENOUS at 02:02

## 2025-01-09 RX ADMIN — PREGABALIN 150 MG: 75 CAPSULE ORAL at 20:48

## 2025-01-09 ASSESSMENT — PAIN DESCRIPTION - DESCRIPTORS: DESCRIPTORS: ACHING

## 2025-01-09 ASSESSMENT — PAIN SCALES - GENERAL: PAINLEVEL_OUTOF10: 5

## 2025-01-09 ASSESSMENT — PAIN DESCRIPTION - ORIENTATION: ORIENTATION: LEFT

## 2025-01-09 ASSESSMENT — PAIN DESCRIPTION - LOCATION: LOCATION: KNEE;BACK

## 2025-01-09 NOTE — PROGRESS NOTES
Occupational Therapy    [facility]  Occupational Therapy Not Seen Note    DATE: 2025    NAME: Deborah Nuñez  MRN: [id number]   : 1950      Patient not seen this date for Occupational Therapy due to:    Other: Cx Per RN Pt on Vent, will continue to follow.    Next Scheduled Treatment: 1/10/25    Electronically signed by PEBBLES MOORE on 2025 at 8:07 AM

## 2025-01-09 NOTE — CARE COORDINATION
Case Management Assessment  Initial Evaluation    Date/Time of Evaluation: 1/9/2025 9:07 AM  Assessment Completed by: Holli Ford RN    If patient is discharged prior to next notation, then this note serves as note for discharge by case management.    Patient Name: Deborah Nuñez                   YOB: 1950  Diagnosis: Hypokalemia [E87.6]  AMS (altered mental status) [R41.82]                   Date / Time: 1/8/2025 12:55 PM    Patient Admission Status: Inpatient   Readmission Risk (Low < 19, Mod (19-27), High > 27): Readmission Risk Score: 16.1    Current PCP: Romeo Montiel MD  PCP verified by CM? Yes    Chart Reviewed: Yes      History Provided by: Child/Family (Daughter- Erin)  Patient Orientation: Person    Patient Cognition: Other (see comment) (confused)    Hospitalization in the last 30 days (Readmission):  No    If yes, Readmission Assessment in CM Navigator will be completed.    Advance Directives:      Code Status: Full Code   Patient's Primary Decision Maker is: Legal Next of Kin      Discharge Planning:    Patient lives with: Family Members Type of Home: Trailer/Mobile Home  Primary Care Giver: Family  Patient Support Systems include: Family Members   Current Financial resources: Medicare, Medicaid  Current community resources: ECF/Home Care  Current services prior to admission: Home Care            Current DME:              Type of Home Care services:  Aide Services    ADLS  Prior functional level: Assistance with the following:, Shopping, Housework, Cooking  Current functional level: Assistance with the following:, Other (see comment) (Await PT/OT eval)    PT AM-PAC:   /24  OT AM-PAC:   /24    Family can provide assistance at DC: Yes  Would you like Case Management to discuss the discharge plan with any other family members/significant others, and if so, who? No  Plans to Return to Present Housing: No  Other Identified Issues/Barriers to RETURNING to current housing: clinical

## 2025-01-09 NOTE — PROGRESS NOTES
Physical Therapy  Facility/Department: [address]  Physical Therapy Initial Assessment    Name: Deborah Nuñez  : 1950  MRN: [id number]  Date of Service: 2025  Chief Complaint   Patient presents with    Altered Mental Status     Pt arrives via ems from home dt co altered mental status per grandson. Per grandson pt does have hx of utis. Per ems pt was delayed on responses with no decifiets for stroke scale. Per ems pt does have prescription for oxycodone at home which were filled 12/10 with most pills still in bottle. Pt was hypertensive en route with afib on monitor pt does have history.          Discharge Recommendations:  Patient would benefit from continued therapy after discharge   PT Equipment Recommendations  Equipment Needed: No  Other: has rollator      Patient Diagnosis(es): The encounter diagnosis was Hypokalemia.  Past Medical History:  has a past medical history of A-fib ([facility]), Anxiety, Arthritis, Blood transfusion reaction, CAD (coronary artery disease), Carotid stenosis, COPD (chronic obstructive pulmonary disease) ([facility]), GERD (gastroesophageal reflux disease), Hepatitis B, History of blood transfusion, Hyperlipidemia, Hypertension, Hypothyroid, Kidney disease, Legally blind, Macular edema, PVD (peripheral vascular disease) ([facility]), Sleep apnea, and Subclavian artery stenosis ([facility]).  Past Surgical History:  has a past surgical history that includes Aorto-femoral Bypass Graft (); tumor removal (Left); Tonsillectomy; Toe amputation (); Cataract removal (Bilateral); Cholecystectomy (2014); Carotid endarterectomy; Cardiac catheterization (10/2012); eye surgery; eye surgery; Carotid endarterectomy (Left, 2019); Carotid endarterectomy (Left, 2019); and vascular surgery (N/A, 2022).    Assessment  Body Structures, Functions, Activity Limitations Requiring Skilled Therapeutic Intervention: Decreased functional mobility ;Decreased ROM;Decreased balance;Decreased  shoulders impaired; hx R humerus fx and full thickness supraspinatus tear of rotator cuff; WFL distally; see OT eval  ROM LUE: shoulders impaired; WFL distally; see OT eval  Strength RLE  Strength RLE: WFL  Comment: seated test  Strength LLE  Strength LLE: WFL  Strength RUE  Comment: see OT eval; shoulder impaired  Strength LUE  Comment: see OT eval; shoulder impaired        Bed mobility  Supine to Sit: Contact guard assistance  Scooting: Stand by assistance  Bed Mobility Comments: Pt supine in bed at start of session and retired to recliner at end. Increased time/effort to complete. HOB 20dg, L rail.  Transfers  Sit to Stand: Contact guard assistance  Stand to Sit: Contact guard assistance  Comment: RW; max cues for hand placement; tends to pull on RW to stand; bed, toilet w/ grab bars, recliner  Ambulation  Surface: Level tile  Device: Rolling Walker  Other Apparatus:  (IV, telemetry)  Assistance: Minimal assistance  Quality of Gait: confustion w/ distraction, flexed posture, assist RW placement and safety/balance w/ max cues  Gait Deviations: Slow Maya;Decreased step length;Decreased step height  Distance: 10+25ft  Comments: HR up to 150 as approach toilet to sit; RN notified; 119 once at recliner; pt report light dizziness once when looking down as walk to recliner     Balance  Posture: Fair  Sitting - Static: Fair;-  Sitting - Dynamic: Fair;-  Standing - Static: Fair;- (RW required)  Standing - Dynamic: Poor;+ (RW required)  Comments: elevated heart rate, general deconditioning, legally blind and pt confusion w/ distraction and poor safety awareness with fall risk                                                     AM-PAC - Mobility    AM-PAC Basic Mobility - Inpatient   How much help is needed turning from your back to your side while in a flat bed without using bedrails?: A Little  How much help is needed moving from lying on your back to sitting on the side of a flat bed without using bedrails?: A

## 2025-01-09 NOTE — PLAN OF CARE
Problem: Discharge Planning  Goal: Discharge to home or other facility with appropriate resources  1/9/2025 1113 by Siena Rowley RN  Outcome: Progressing  1/9/2025 0443 by Sweta Ortega RN  Outcome: Progressing  1/9/2025 0443 by Sweta Ortega RN  Outcome: Progressing  Flowsheets (Taken 1/8/2025 1955)  Discharge to home or other facility with appropriate resources:   Identify barriers to discharge with patient and caregiver   Arrange for needed discharge resources and transportation as appropriate   Identify discharge learning needs (meds, wound care, etc)   Refer to discharge planning if patient needs post-hospital services based on physician order or complex needs related to functional status, cognitive ability or social support system     Problem: Skin/Tissue Integrity  Goal: Absence of new skin breakdown  Description: 1.  Monitor for areas of redness and/or skin breakdown  2.  Assess vascular access sites hourly  3.  Every 4-6 hours minimum:  Change oxygen saturation probe site  4.  Every 4-6 hours:  If on nasal continuous positive airway pressure, respiratory therapy assess nares and determine need for appliance change or resting period.  1/9/2025 1113 by Siena Rowley RN  Outcome: Progressing  1/9/2025 0443 by Sweta Ortega RN  Outcome: Progressing  1/9/2025 0443 by Sweta Ortega RN  Outcome: Progressing     Problem: Safety - Adult  Goal: Free from fall injury  1/9/2025 1113 by Siena Rowley RN  Outcome: Progressing  1/9/2025 0443 by Sweta Ortega RN  Outcome: Progressing  1/9/2025 0443 by Sweta Ortega RN  Outcome: Progressing     Problem: ABCDS Injury Assessment  Goal: Absence of physical injury  1/9/2025 1113 by Siena Rowley RN  Outcome: Progressing  1/9/2025 0443 by Sweta Ortega RN  Outcome: Progressing  1/9/2025 0443 by Sweta Ortega RN  Outcome: Progressing     Problem: Neurosensory - Adult  Goal: Achieves stable or improved neurological status  1/9/2025 1113 by  Siena Rowley, RN  Outcome: Progressing  1/9/2025 0443 by Sweta Ortega RN  Outcome: Progressing     Problem: Metabolic/Fluid and Electrolytes - Adult  Goal: Electrolytes maintained within normal limits  1/9/2025 1113 by Siena Rowley RN  Outcome: Progressing  1/9/2025 0443 by Sweta Ortega RN  Outcome: Progressing

## 2025-01-09 NOTE — CARE COORDINATION
Patient's daughter, Consuelo, provided CM with copy of patient's living will and DPOA documentation. Copy placed in patient's soft chart. Consuelo confirmed that [facility] is SNF of choice.    1140- Call received from Keren with [facility] stating they are able to accept patient. Keren states that she will initiate precert today. CM called and updated patient's daughter, Consuelo, and she verbalizes being agreeable with plan.    1220- Call received from Keren with [facility] stating that patient does not have [facility], she has traditional medicare part A and [place] medicaid as secondary. Patient will not need precert for SNF admission but would require 3-day inpatient stay.

## 2025-01-09 NOTE — PROGRESS NOTES
[facility]  Office: [phone]  Justin Ball DO, Demarcus Dunne DO, Marcello Benítez DO, Farshad Vasques DO, Ragini De Dios MD, Stephany Curry MD, Eliel Orozco MD, Yue Ulrich MD,  Angel Nettles MD, Flores Martínez MD, Waleska Nj MD,  Aida Banda DO, Raúl Garcia MD, Adams Ratliff MD, Berhane Ball DO, Ruby Ferrari MD,  Romeo Box DO, Concepción Mojica MD, Nevaeh Valencia MD, Sallie Galindo MD, Nay Gutierrez MD,  Ibrahima Scott MD, Venkat Monterroso MD, Wilton Jara MD, Qi Mcconnell MD, Sami Coley MD, Abdifatah Rivera MD, Mario King DO, Orlando Fong MD, Aida Rajput MD, Mohsin Reza, MD, Shirley Waterhouse, CNP,  Dona Bernabe CNP, Mario Glynn, CNP,  Rehana Green, JENNIFER, Jessica Mendoza, CNP, Ama Hauser, CNP, aCpri Schmidt, CNP, Fang Hidalgo, CNP, Beverley Galvan, PA-C, Yareli Abrams PA-C, Lori Kim, CNP, Froylan Deng, CNP,  Toya Kohli, CNP, Janiya Grayson, CNP, Ame Dsouza, CNP,  Maida Michaels, JOSE FRANCISCO, Armida Farris, CNP         [facility]   IN-PATIENT SERVICE   [facility]    Progress Note    1/9/2025    9:00 AM    Name:   Deborah Nuñez  MRN:     [id number]     Acct:      [id number]   Room:   [facility] Day:  1  Admit Date:  1/8/2025 12:55 PM    PCP:   Romeo Montiel MD  Code Status:  Full Code    Subjective:     C/C:   Chief Complaint   Patient presents with    Altered Mental Status     Pt arrives via ems from home dt co altered mental status per grandson. Per grandson pt does have hx of utis. Per ems pt was delayed on responses with no decifiets for stroke scale. Per ems pt does have prescription for oxycodone at home which were filled 12/10 with most pills still in bottle. Pt was hypertensive en route with afib on monitor pt does have history.      Interval History Status: improved.     Patient seen and examined at bedside this morning. No acute events overnight. Patient resting in bed comfortably. Denies any CP,  ondansetron **OR** ondansetron, polyethylene glycol, acetaminophen **OR** acetaminophen, ALPRAZolam    Data:     Past Medical History:   has a past medical history of A-fib (HCC), Anxiety, Arthritis, Blood transfusion reaction, CAD (coronary artery disease), Carotid stenosis, COPD (chronic obstructive pulmonary disease) (HCC), GERD (gastroesophageal reflux disease), Hepatitis B, History of blood transfusion, Hyperlipidemia, Hypertension, Hypothyroid, Kidney disease, Legally blind, Macular edema, PVD (peripheral vascular disease) (HCC), Sleep apnea, and Subclavian artery stenosis (HCC).    Social History:   reports that she quit smoking about 10 years ago. Her smoking use included cigarettes. She has quit using smokeless tobacco. She reports that she does not drink alcohol and does not use drugs.     Family History:   Family History   Problem Relation Age of Onset    Heart Attack Father     Stroke Father     High Blood Pressure Father     Heart Disease Father     Cancer Mother         Multiple myeloma    Cancer Sister         Lung with mets to brain    Cancer Maternal Aunt         Several aunts had unknown cancer    Heart Attack Brother     Liver Disease Brother     Heart Defect Sister        Vitals:  BP (!) 136/100   Pulse 85   Temp 99 °F (37.2 °C) (Oral)   Resp 18   Ht 1.6 m (5' 3\")   Wt 75.8 kg (167 lb)   SpO2 97%   BMI 29.58 kg/m²   Temp (24hrs), Av.6 °F (37 °C), Min:98.2 °F (36.8 °C), Max:99 °F (37.2 °C)    No results for input(s): \"POCGLU\" in the last 72 hours.    I/O (24Hr):    Intake/Output Summary (Last 24 hours) at 2025 0900  Last data filed at 2025 2353  Gross per 24 hour   Intake 240 ml   Output --   Net 240 ml       Labs:  Hematology:  Recent Labs     25  1332 25  0552   WBC 7.7 8.1   RBC 4.32 3.70*   HGB 13.4 11.8*   HCT 40.4 34.9*   MCV 93.6 94.3   MCH 31.0 31.9   MCHC 33.1 33.8   RDW 14.6 14.8    373   MPV 8.3 8.4   INR  --  1.4     Chemistry:  Recent Labs

## 2025-01-09 NOTE — PLAN OF CARE
Problem: Discharge Planning  Goal: Discharge to home or other facility with appropriate resources  1/9/2025 0443 by Sweta Ortega, RN  Outcome: Progressing     Problem: Skin/Tissue Integrity  Goal: Absence of new skin breakdown  Description: 1.  Monitor for areas of redness and/or skin breakdown  2.  Assess vascular access sites hourly  3.  Every 4-6 hours minimum:  Change oxygen saturation probe site  4.  Every 4-6 hours:  If on nasal continuous positive airway pressure, respiratory therapy assess nares and determine need for appliance change or resting period.  1/9/2025 0443 by Sweta Ortega, RN  Outcome: Progressing     Problem: Safety - Adult  Goal: Free from fall injury  1/9/2025 0443 by Sweta Ortega, RN  Outcome: Progressing     Problem: ABCDS Injury Assessment  Goal: Absence of physical injury  1/9/2025 0443 by Sweta Ortega, RN  Outcome: Progressing     Problem: Neurosensory - Adult  Goal: Achieves stable or improved neurological status  Outcome: Progressing     Problem: Metabolic/Fluid and Electrolytes - Adult  Goal: Electrolytes maintained within normal limits  Outcome: Progressing

## 2025-01-09 NOTE — PROGRESS NOTES
Occupational Therapy  Occupational Therapy Initial Evaluation  Facility/Department: [address]   Patient Name: Deborah Nuñez        MRN: [id number]    : 1950    Date of Service: 2025    Chief Complaint   Patient presents with    Altered Mental Status     Pt arrives via ems from home dt co altered mental status per grandson. Per grandson pt does have hx of utis. Per ems pt was delayed on responses with no decifiets for stroke scale. Per ems pt does have prescription for oxycodone at home which were filled 12/10 with most pills still in bottle. Pt was hypertensive en route with afib on monitor pt does have history.      Past Medical History:  has a past medical history of A-fib ([facility]), Anxiety, Arthritis, Blood transfusion reaction, CAD (coronary artery disease), Carotid stenosis, COPD (chronic obstructive pulmonary disease) ([facility]), GERD (gastroesophageal reflux disease), Hepatitis B, History of blood transfusion, Hyperlipidemia, Hypertension, Hypothyroid, Kidney disease, Legally blind, Macular edema, PVD (peripheral vascular disease) ([facility]), Sleep apnea, and Subclavian artery stenosis ([facility]).  Past Surgical History:  has a past surgical history that includes Aorto-femoral Bypass Graft (); tumor removal (Left); Tonsillectomy; Toe amputation (); Cataract removal (Bilateral); Cholecystectomy (2014); Carotid endarterectomy; Cardiac catheterization (10/2012); eye surgery; eye surgery; Carotid endarterectomy (Left, 2019); Carotid endarterectomy (Left, 2019); and vascular surgery (N/A, 2022).    Discharge Recommendations  Discharge Recommendations: Patient would benefit from continued therapy after discharge  OT Equipment Recommendations  Other: CTA    Assessment  Performance deficits / Impairments: Decreased functional mobility ;Decreased cognition;Decreased high-level IADLs;Decreased ADL status;Decreased ROM;Decreased strength;Decreased balance;Decreased safe awareness  Assessment: Pt

## 2025-01-09 NOTE — ED NOTES
Writer called floor x3, no answer for update. Writer called House supervisor, Angel. Writer informed him patient is now on 2L NC, and was given 10mg of hydralazine due to patients blood pressure was 184/102.

## 2025-01-09 NOTE — CARE COORDINATION
PT/OT working with patient when CM attempted to see for transitional planning. Initial transitional assessment to be completed at a later time.

## 2025-01-10 LAB
ANION GAP SERPL CALCULATED.3IONS-SCNC: 10 MMOL/L (ref 9–17)
BASOPHILS # BLD: 0.01 K/UL (ref 0–0.2)
BASOPHILS NFR BLD: 0 % (ref 0–2)
BUN SERPL-MCNC: 7 MG/DL (ref 8–23)
CALCIUM SERPL-MCNC: 8.3 MG/DL (ref 8.6–10.4)
CHLORIDE SERPL-SCNC: 101 MMOL/L (ref 98–107)
CO2 SERPL-SCNC: 26 MMOL/L (ref 20–31)
CREAT SERPL-MCNC: 0.9 MG/DL (ref 0.5–0.9)
EKG ATRIAL RATE: 80 BPM
EKG Q-T INTERVAL: 220 MS
EKG Q-T INTERVAL: 360 MS
EKG QRS DURATION: 64 MS
EKG QRS DURATION: 70 MS
EKG QTC CALCULATION (BAZETT): 361 MS
EKG QTC CALCULATION (BAZETT): 454 MS
EKG R AXIS: -41 DEGREES
EKG R AXIS: 21 DEGREES
EKG T AXIS: -14 DEGREES
EKG T AXIS: -40 DEGREES
EKG VENTRICULAR RATE: 162 BPM
EKG VENTRICULAR RATE: 96 BPM
EOSINOPHIL # BLD: 0.06 K/UL (ref 0–0.4)
EOSINOPHILS RELATIVE PERCENT: 1 % (ref 1–4)
ERYTHROCYTE [DISTWIDTH] IN BLOOD BY AUTOMATED COUNT: 14.7 % (ref 12.5–15.4)
GFR, ESTIMATED: 68 ML/MIN/1.73M2
GLUCOSE SERPL-MCNC: 113 MG/DL (ref 70–99)
HCT VFR BLD AUTO: 37.3 % (ref 36–46)
HGB BLD-MCNC: 12 G/DL (ref 12–16)
LYMPHOCYTES NFR BLD: 2.17 K/UL (ref 1–4.8)
LYMPHOCYTES RELATIVE PERCENT: 22 % (ref 24–44)
MAGNESIUM SERPL-MCNC: 2 MG/DL (ref 1.6–2.6)
MCH RBC QN AUTO: 31.4 PG (ref 26–34)
MCHC RBC AUTO-ENTMCNC: 32.2 G/DL (ref 31–37)
MCV RBC AUTO: 97.6 FL (ref 80–100)
MONOCYTES NFR BLD: 0.66 K/UL (ref 0.1–1.2)
MONOCYTES NFR BLD: 7 % (ref 2–11)
NEUTROPHILS NFR BLD: 70 % (ref 36–66)
NEUTS SEG NFR BLD: 7.02 K/UL (ref 1.8–7.7)
PLATELET # BLD AUTO: 298 K/UL (ref 140–450)
PMV BLD AUTO: 10.3 FL (ref 8–14)
POTASSIUM SERPL-SCNC: 3.1 MMOL/L (ref 3.7–5.3)
RBC # BLD AUTO: 3.82 M/UL (ref 4–5.2)
SODIUM SERPL-SCNC: 137 MMOL/L (ref 135–144)
WBC OTHER # BLD: 9.9 K/UL (ref 3.5–11)

## 2025-01-10 PROCEDURE — 85025 COMPLETE CBC W/AUTO DIFF WBC: CPT

## 2025-01-10 PROCEDURE — 2500000003 HC RX 250 WO HCPCS: Performed by: STUDENT IN AN ORGANIZED HEALTH CARE EDUCATION/TRAINING PROGRAM

## 2025-01-10 PROCEDURE — 97530 THERAPEUTIC ACTIVITIES: CPT

## 2025-01-10 PROCEDURE — 83735 ASSAY OF MAGNESIUM: CPT

## 2025-01-10 PROCEDURE — 36415 COLL VENOUS BLD VENIPUNCTURE: CPT

## 2025-01-10 PROCEDURE — 2580000003 HC RX 258: Performed by: NURSE PRACTITIONER

## 2025-01-10 PROCEDURE — 99232 SBSQ HOSP IP/OBS MODERATE 35: CPT | Performed by: STUDENT IN AN ORGANIZED HEALTH CARE EDUCATION/TRAINING PROGRAM

## 2025-01-10 PROCEDURE — 97110 THERAPEUTIC EXERCISES: CPT

## 2025-01-10 PROCEDURE — 2060000000 HC ICU INTERMEDIATE R&B

## 2025-01-10 PROCEDURE — 93010 ELECTROCARDIOGRAM REPORT: CPT | Performed by: INTERNAL MEDICINE

## 2025-01-10 PROCEDURE — 97116 GAIT TRAINING THERAPY: CPT

## 2025-01-10 PROCEDURE — 6370000000 HC RX 637 (ALT 250 FOR IP): Performed by: STUDENT IN AN ORGANIZED HEALTH CARE EDUCATION/TRAINING PROGRAM

## 2025-01-10 PROCEDURE — 97535 SELF CARE MNGMENT TRAINING: CPT

## 2025-01-10 PROCEDURE — 80048 BASIC METABOLIC PNL TOTAL CA: CPT

## 2025-01-10 RX ADMIN — METOPROLOL TARTRATE 25 MG: 25 TABLET, FILM COATED ORAL at 08:46

## 2025-01-10 RX ADMIN — LEVOTHYROXINE SODIUM 50 MCG: 50 TABLET ORAL at 08:47

## 2025-01-10 RX ADMIN — BENZTROPINE MESYLATE 0.5 MG: 1 TABLET ORAL at 20:39

## 2025-01-10 RX ADMIN — PREGABALIN 150 MG: 75 CAPSULE ORAL at 20:38

## 2025-01-10 RX ADMIN — ROFLUMILAST 500 MCG: 500 TABLET ORAL at 08:46

## 2025-01-10 RX ADMIN — PREGABALIN 150 MG: 75 CAPSULE ORAL at 08:46

## 2025-01-10 RX ADMIN — METOPROLOL TARTRATE 25 MG: 25 TABLET, FILM COATED ORAL at 20:38

## 2025-01-10 RX ADMIN — Medication 2000 UNITS: at 08:46

## 2025-01-10 RX ADMIN — SODIUM CHLORIDE, PRESERVATIVE FREE 10 ML: 5 INJECTION INTRAVENOUS at 08:55

## 2025-01-10 RX ADMIN — SODIUM CHLORIDE: 9 INJECTION, SOLUTION INTRAVENOUS at 08:58

## 2025-01-10 RX ADMIN — FERROUS SULFATE TAB EC 325 MG (65 MG FE EQUIVALENT) 325 MG: 325 (65 FE) TABLET DELAYED RESPONSE at 08:47

## 2025-01-10 RX ADMIN — ALPRAZOLAM 1 MG: 0.5 TABLET ORAL at 21:52

## 2025-01-10 RX ADMIN — BENZTROPINE MESYLATE 0.5 MG: 1 TABLET ORAL at 08:46

## 2025-01-10 RX ADMIN — FOLIC ACID 1 MG: 1 TABLET ORAL at 08:47

## 2025-01-10 RX ADMIN — PANTOPRAZOLE SODIUM 40 MG: 40 TABLET, DELAYED RELEASE ORAL at 08:46

## 2025-01-10 RX ADMIN — RIVAROXABAN 20 MG: 10 TABLET, FILM COATED ORAL at 08:46

## 2025-01-10 RX ADMIN — POTASSIUM CHLORIDE 40 MEQ: 1500 TABLET, EXTENDED RELEASE ORAL at 08:46

## 2025-01-10 RX ADMIN — ROSUVASTATIN CALCIUM 10 MG: 5 TABLET, FILM COATED ORAL at 08:46

## 2025-01-10 RX ADMIN — TRAMADOL HYDROCHLORIDE 50 MG: 50 TABLET, COATED ORAL at 08:46

## 2025-01-10 RX ADMIN — Medication 500 MG: at 08:46

## 2025-01-10 ASSESSMENT — PAIN DESCRIPTION - ORIENTATION: ORIENTATION: LEFT

## 2025-01-10 ASSESSMENT — PAIN SCALES - GENERAL
PAINLEVEL_OUTOF10: 6
PAINLEVEL_OUTOF10: 4

## 2025-01-10 ASSESSMENT — PAIN DESCRIPTION - DESCRIPTORS: DESCRIPTORS: DISCOMFORT;ACHING

## 2025-01-10 ASSESSMENT — PAIN DESCRIPTION - LOCATION: LOCATION: SHOULDER

## 2025-01-10 NOTE — PLAN OF CARE
Problem: Discharge Planning  Goal: Discharge to home or other facility with appropriate resources  Outcome: Progressing  Flowsheets  Taken 1/9/2025 2012 by Sweta Ortega, RN  Discharge to home or other facility with appropriate resources:   Identify barriers to discharge with patient and caregiver   Arrange for needed discharge resources and transportation as appropriate   Identify discharge learning needs (meds, wound care, etc)   Refer to discharge planning if patient needs post-hospital services based on physician order or complex needs related to functional status, cognitive ability or social support system  Taken 1/9/2025 1415 by Florecita Denson RN  Discharge to home or other facility with appropriate resources:   Identify barriers to discharge with patient and caregiver   Arrange for needed discharge resources and transportation as appropriate   Identify discharge learning needs (meds, wound care, etc)   Refer to discharge planning if patient needs post-hospital services based on physician order or complex needs related to functional status, cognitive ability or social support system     Problem: Skin/Tissue Integrity  Goal: Absence of new skin breakdown  Description: 1.  Monitor for areas of redness and/or skin breakdown  2.  Assess vascular access sites hourly  3.  Every 4-6 hours minimum:  Change oxygen saturation probe site  4.  Every 4-6 hours:  If on nasal continuous positive airway pressure, respiratory therapy assess nares and determine need for appliance change or resting period.  Outcome: Progressing     Problem: Safety - Adult  Goal: Free from fall injury  Outcome: Progressing     Problem: ABCDS Injury Assessment  Goal: Absence of physical injury  Outcome: Progressing     Problem: Neurosensory - Adult  Goal: Achieves stable or improved neurological status  Outcome: Progressing  Flowsheets (Taken 1/9/2025 2012)  Achieves stable or improved neurological status:   Assess for and report changes in  neurological status   Initiate measures to prevent increased intracranial pressure   Maintain blood pressure and fluid volume within ordered parameters to optimize cerebral perfusion and minimize risk of hemorrhage   Monitor temperature, glucose, and sodium. Initiate appropriate interventions as ordered     Problem: Metabolic/Fluid and Electrolytes - Adult  Goal: Electrolytes maintained within normal limits  Outcome: Progressing  Flowsheets  Taken 1/9/2025 2012 by Sweta Orteag, RN  Electrolytes maintained within normal limits:   Monitor labs and assess patient for signs and symptoms of electrolyte imbalances   Administer electrolyte replacement as ordered   Monitor response to electrolyte replacements, including repeat lab results as appropriate   Fluid restriction as ordered   Instruct patient on fluid and nutrition restrictions as appropriate  Taken 1/9/2025 1415 by Florecita Denson, RN  Electrolytes maintained within normal limits:   Monitor labs and assess patient for signs and symptoms of electrolyte imbalances   Administer electrolyte replacement as ordered   Monitor response to electrolyte replacements, including repeat lab results as appropriate   Fluid restriction as ordered   Instruct patient on fluid and nutrition restrictions as appropriate

## 2025-01-10 NOTE — CARE COORDINATION
[facility] Quality Flow/Interdisciplinary Rounds Progress Note    Quality Flow Rounds held on January 10, 2025 at 0930    Disciplines Attending:  Bedside Nurse, , and Nursing Unit Leadership    Barriers to Discharge: Clinical Status    Anticipated Discharge Date:   1/11/25    Anticipated Discharge Disposition: SNF    [facility] RISK OF UNPLANNED READMISSION 2.0             16.4 Total Score        Discussed patient goal for the day, patient clinical progression, and barriers to discharge. Plan for DC tomorrow.       Nafisa Aaron RN  January 10, 2025

## 2025-01-10 NOTE — PROGRESS NOTES
oral replacement **OR** potassium chloride, magnesium sulfate, ondansetron **OR** ondansetron, polyethylene glycol, acetaminophen **OR** acetaminophen, ALPRAZolam    Data:     Past Medical History:   has a past medical history of A-fib (HCC), Anxiety, Arthritis, Blood transfusion reaction, CAD (coronary artery disease), Carotid stenosis, COPD (chronic obstructive pulmonary disease) (HCC), GERD (gastroesophageal reflux disease), Hepatitis B, History of blood transfusion, Hyperlipidemia, Hypertension, Hypothyroid, Kidney disease, Legally blind, Macular edema, PVD (peripheral vascular disease) (HCC), Sleep apnea, and Subclavian artery stenosis (HCC).    Social History:   reports that she quit smoking about 10 years ago. Her smoking use included cigarettes. She has quit using smokeless tobacco. She reports that she does not drink alcohol and does not use drugs.     Family History:   Family History   Problem Relation Age of Onset    Heart Attack Father     Stroke Father     High Blood Pressure Father     Heart Disease Father     Cancer Mother         Multiple myeloma    Cancer Sister         Lung with mets to brain    Cancer Maternal Aunt         Several aunts had unknown cancer    Heart Attack Brother     Liver Disease Brother     Heart Defect Sister        Vitals:  BP (!) 176/79   Pulse 83   Temp 98.8 °F (37.1 °C) (Oral)   Resp 18   Ht 1.6 m (5' 3\")   Wt 75.8 kg (167 lb)   SpO2 94%   BMI 29.58 kg/m²   Temp (24hrs), Av.9 °F (37.2 °C), Min:98.4 °F (36.9 °C), Max:99.3 °F (37.4 °C)    No results for input(s): \"POCGLU\" in the last 72 hours.    I/O (24Hr):    Intake/Output Summary (Last 24 hours) at 1/10/2025 1020  Last data filed at 1/10/2025 0602  Gross per 24 hour   Intake 240 ml   Output 1050 ml   Net -810 ml       Labs:  Hematology:  Recent Labs     25  1332 25  0552 01/10/25  0547   WBC 7.7 8.1 9.9   RBC 4.32 3.70* 3.82*   HGB 13.4 11.8* 12.0   HCT 40.4 34.9* 37.3   MCV 93.6 94.3 97.6   MCH 31.0

## 2025-01-10 NOTE — PROGRESS NOTES
Physical Therapy  Facility/Department: [address]   Physical Therapy Daily Treatment Note    Patient Name: Deborah Nuñez        MRN: [id number]    : 1950    Date of Service: 1/10/2025    Chief Complaint   Patient presents with    Altered Mental Status     Pt arrives via ems from home dt co altered mental status per grandson. Per grandson pt does have hx of utis. Per ems pt was delayed on responses with no decifiets for stroke scale. Per ems pt does have prescription for oxycodone at home which were filled 12/10 with most pills still in bottle. Pt was hypertensive en route with afib on monitor pt does have history.      Past Medical History:  has a past medical history of A-fib ([facility]), Anxiety, Arthritis, Blood transfusion reaction, CAD (coronary artery disease), Carotid stenosis, COPD (chronic obstructive pulmonary disease) ([facility]), GERD (gastroesophageal reflux disease), Hepatitis B, History of blood transfusion, Hyperlipidemia, Hypertension, Hypothyroid, Kidney disease, Legally blind, Macular edema, PVD (peripheral vascular disease) ([facility]), Sleep apnea, and Subclavian artery stenosis ([facility]).  Past Surgical History:  has a past surgical history that includes Aorto-femoral Bypass Graft (); tumor removal (Left); Tonsillectomy; Toe amputation (); Cataract removal (Bilateral); Cholecystectomy (2014); Carotid endarterectomy; Cardiac catheterization (10/2012); eye surgery; eye surgery; Carotid endarterectomy (Left, 2019); Carotid endarterectomy (Left, 2019); and vascular surgery (N/A, 2022).    Discharge Recommendations  Discharge Recommendations: Patient would benefit from continued therapy after discharge  PT Equipment Recommendations  Equipment Needed: No  Other: has rollator    Assessment  Body Structures, Functions, Activity Limitations Requiring Skilled Therapeutic Intervention: Decreased functional mobility ;Decreased ROM;Decreased balance;Decreased endurance;Decreased safe  Orthoses?: No  Position Activity Restriction  Other Position/Activity Restrictions: legally blind, speech difficulties    Subjective  General  Family/Caregiver Present: No  Follows Commands: Impaired  Subjective  Subjective: Pt denies pain  O2 Device: None (Room air)    Objective  Orientation  Orientation Level: Oriented to time;Disoriented to situation;Oriented to person;Oriented to place  Cognition  Overall Cognitive Status: Exceptions  Arousal/Alertness: Appears intact  Following Commands: Follows one step commands with increased time;Follows one step commands with repetition;Inconsistently follows commands  Attention Span: Attends with cues to redirect  Memory: Impaired  Safety Judgement: Decreased awareness of need for safety;Decreased awareness of need for assistance  Problem Solving: Decreased awareness of errors;Assistance required to correct errors made;Assistance required to identify errors made;Assistance required to generate solutions  Insights: Decreased awareness of deficits  Sequencing: Requires cues for some                   Mobility   Bed mobility  Supine to Sit: Minimal assistance  Scooting: Minimal assistance  Bed Mobility Comments: Upon entering room, pt supine, however, legs hanging off side of bed. Use of bed rail for supine to sit. Noted difficulty processing tasks         Transfers  Sit to Stand: Contact guard assistance  Stand to Sit: Contact guard assistance (education on safety with hand placements)  Comment: Slow with movements    Ambulation  Surface: Level tile  Device: Rolling Walker  Assistance: Minimal assistance  Quality of Gait: Noted consistent posterior lean, weight on heels. Pt requires cues to continue gait and assist with walker maneuverability. Noted decrease ESTHER  Gait Deviations: Decreased step length;Decreased step height;Slow Maya  Distance: 4ft and 25ft  Comments: Pt c/o legs feeling numb. Pt reports that this is chronic               Balance   Balance  Comments: Pt

## 2025-01-10 NOTE — PROGRESS NOTES
Physician Progress Note      PATIENT:               ROSIE COVARRUBIAS  CSN #:                  [id number]  :                       1950  ADMIT DATE:       2025 12:55 PM  DISCH DATE:  RESPONDING  PROVIDER #:        Abdifatah Rivera MD          QUERY TEXT:    Patient admitted with metabolic encephalopathy and hypokalemia, noted to have   chronic A fib and is maintained on Xarelto. If possible, please document if   you are evaluating and/or treating any of the following:?  ?  The medical record reflects the following:  Risk Factors: Chronic A fib    Clinical Indicators: H&P and progress notes: Chronic A-fib on Xarelto    Treatment: Continue home Xarelto.    Thank-you,  Sherlyn Zavala RN, CDS  Options provided:  -- Secondary hypercoagulable state in a patient with atrial fibrillation  -- Other - I will add my own diagnosis  -- Disagree - Not applicable / Not valid  -- Disagree - Clinically unable to determine / Unknown  -- Refer to Clinical Documentation Reviewer    PROVIDER RESPONSE TEXT:    This patient has secondary hypercoagulable state in a patient with atrial   fibrillation.    Query created by: Sherlyn Zavala on 2025 1:35 PM      Electronically signed by:  Abdifatah Rivera MD 1/10/2025 6:43 PM

## 2025-01-10 NOTE — PROGRESS NOTES
difficulties    Subjective  General  Patient assessed for rehabilitation services?: Yes  Family / Caregiver Present: No  Subjective  Subjective: RN ok'd pt for therapy tx this PM. Pt just finishing with PT and is up in chair. Pt reporting no pain and is agreeable/cooperative however confused at time of tx.     O2 Device: None (Room air)    Objective  Orientation  Orientation Level: Oriented to time;Disoriented to situation;Oriented to person;Oriented to place  Cognition  Overall Cognitive Status: Exceptions  Arousal/Alertness: Appears intact  Following Commands: Follows one step commands with increased time;Follows one step commands with repetition;Inconsistently follows commands  Attention Span: Attends with cues to redirect  Memory: Impaired  Safety Judgement: Decreased awareness of need for safety;Decreased awareness of need for assistance  Problem Solving: Decreased awareness of errors;Assistance required to correct errors made;Assistance required to identify errors made;Assistance required to generate solutions  Insights: Decreased awareness of deficits  Sequencing: Requires cues for some    Activities of Daily Living  Grooming: Minimal assistance  Grooming Skilled Clinical Factors: Seated in recliner pt setup for face washing, MIN A for oral hygiene (assist needed to load paste on brush due to tremors), MOD A hair brushing (pt able to compelte part of brushing however assist to complete); cues for thoroughness throughout  Additional Comments: Deferred transfers/mobility as pt just retired to recliner with PT; pt agreeable to seated BUE HEP/grooming    Transfers/Mobility  Bed mobility  Supine to Sit: Minimal assistance  Scooting: Minimal assistance  Bed Mobility Comments: Pt up in recliner at start of tx and remained in recliner throughout tx    Transfers  Transfer Comments: Deferred transfers/mobility as pt just retired to recliner with PT; pt agreeable to seated BUE HEP/grooming    Patient Education  Patient  Education  Education Given To: Patient  Education Provided: Home Exercise Program;ADL Adaptive Strategies;Energy Conservation  Education Method: Verbal;Demonstration  Barriers to Learning: Cognition  Education Outcome: Verbalized understanding;Continued education needed    Goals  Short Term Goals  Time Frame for Short Term Goals: 14  Short Term Goal 1: Complete bed mobility IND  Short Term Goal 2: Complete transfers Mod I w/ LRD and good hand placement with no VC  Short Term Goal 3: Complete Func mob Mod I w/ good safety awareness  Short Term Goal 4: Complete toileting/lower body dressing SUP w/ LRD and AE PRN  Short Term Goal 5: IND demo BUE HEP to increase strength 1MMT grade for increased participation in ADLs/ADL transfers    Plan  Occupational Therapy Plan  Times Per Week: 4-5x/wk  Times Per Day: Once a day  Current Treatment Recommendations: Strengthening, Balance training, Functional mobility training, Patient/Caregiver education & training, Safety education & training, Pain management, Equipment evaluation, education, & procurement, Self-Care / ADL, Home management training    Minutes  OT Individual Minutes  Time In: 1457  Time Out: 1520  Minutes: 23  Time Code Minutes   Timed Code Treatment Minutes: 23 Minutes    Electronically signed by MAUREEN Zepeda on 1/10/25 at 3:29 PM EST

## 2025-01-11 LAB
ANION GAP SERPL CALCULATED.3IONS-SCNC: 12 MMOL/L (ref 9–17)
BASOPHILS # BLD: 0.1 K/UL (ref 0–0.2)
BASOPHILS NFR BLD: 1 % (ref 0–2)
BUN SERPL-MCNC: 9 MG/DL (ref 8–23)
CALCIUM SERPL-MCNC: 8.8 MG/DL (ref 8.6–10.4)
CHLORIDE SERPL-SCNC: 100 MMOL/L (ref 98–107)
CO2 SERPL-SCNC: 27 MMOL/L (ref 20–31)
CREAT SERPL-MCNC: 0.9 MG/DL (ref 0.5–0.9)
EOSINOPHIL # BLD: 0 K/UL (ref 0–0.4)
EOSINOPHILS RELATIVE PERCENT: 1 % (ref 1–4)
ERYTHROCYTE [DISTWIDTH] IN BLOOD BY AUTOMATED COUNT: 14.7 % (ref 12.5–15.4)
GFR, ESTIMATED: 68 ML/MIN/1.73M2
GLUCOSE SERPL-MCNC: 97 MG/DL (ref 70–99)
HCT VFR BLD AUTO: 36 % (ref 36–46)
HGB BLD-MCNC: 12.3 G/DL (ref 12–16)
LYMPHOCYTES NFR BLD: 2.2 K/UL (ref 1–4.8)
LYMPHOCYTES RELATIVE PERCENT: 30 % (ref 24–44)
MCH RBC QN AUTO: 32.4 PG (ref 26–34)
MCHC RBC AUTO-ENTMCNC: 34.1 G/DL (ref 31–37)
MCV RBC AUTO: 95 FL (ref 80–100)
MONOCYTES NFR BLD: 0.5 K/UL (ref 0.1–1.2)
MONOCYTES NFR BLD: 7 % (ref 2–11)
NEUTROPHILS NFR BLD: 61 % (ref 36–66)
NEUTS SEG NFR BLD: 4.5 K/UL (ref 1.8–7.7)
PLATELET # BLD AUTO: 295 K/UL (ref 140–450)
PMV BLD AUTO: 8.4 FL (ref 6–12)
POTASSIUM SERPL-SCNC: 3.7 MMOL/L (ref 3.7–5.3)
RBC # BLD AUTO: 3.79 M/UL (ref 4–5.2)
SODIUM SERPL-SCNC: 139 MMOL/L (ref 135–144)
WBC OTHER # BLD: 7.3 K/UL (ref 3.5–11)

## 2025-01-11 PROCEDURE — 85025 COMPLETE CBC W/AUTO DIFF WBC: CPT

## 2025-01-11 PROCEDURE — 2500000003 HC RX 250 WO HCPCS: Performed by: STUDENT IN AN ORGANIZED HEALTH CARE EDUCATION/TRAINING PROGRAM

## 2025-01-11 PROCEDURE — 2060000000 HC ICU INTERMEDIATE R&B

## 2025-01-11 PROCEDURE — 99232 SBSQ HOSP IP/OBS MODERATE 35: CPT | Performed by: STUDENT IN AN ORGANIZED HEALTH CARE EDUCATION/TRAINING PROGRAM

## 2025-01-11 PROCEDURE — 97116 GAIT TRAINING THERAPY: CPT

## 2025-01-11 PROCEDURE — 36415 COLL VENOUS BLD VENIPUNCTURE: CPT

## 2025-01-11 PROCEDURE — 80048 BASIC METABOLIC PNL TOTAL CA: CPT

## 2025-01-11 PROCEDURE — 97535 SELF CARE MNGMENT TRAINING: CPT

## 2025-01-11 PROCEDURE — 6370000000 HC RX 637 (ALT 250 FOR IP): Performed by: STUDENT IN AN ORGANIZED HEALTH CARE EDUCATION/TRAINING PROGRAM

## 2025-01-11 PROCEDURE — 97530 THERAPEUTIC ACTIVITIES: CPT

## 2025-01-11 PROCEDURE — 97110 THERAPEUTIC EXERCISES: CPT

## 2025-01-11 RX ORDER — POTASSIUM CHLORIDE 1.5 G/1.58G
20 POWDER, FOR SOLUTION ORAL DAILY
Qty: 30 EACH | Refills: 3 | Status: SHIPPED | OUTPATIENT
Start: 2025-01-11

## 2025-01-11 RX ADMIN — ROFLUMILAST 500 MCG: 500 TABLET ORAL at 10:35

## 2025-01-11 RX ADMIN — BENZTROPINE MESYLATE 0.5 MG: 1 TABLET ORAL at 10:01

## 2025-01-11 RX ADMIN — METOPROLOL TARTRATE 25 MG: 25 TABLET, FILM COATED ORAL at 21:17

## 2025-01-11 RX ADMIN — SODIUM CHLORIDE, PRESERVATIVE FREE 10 ML: 5 INJECTION INTRAVENOUS at 10:00

## 2025-01-11 RX ADMIN — FOLIC ACID 1 MG: 1 TABLET ORAL at 10:01

## 2025-01-11 RX ADMIN — RIVAROXABAN 20 MG: 10 TABLET, FILM COATED ORAL at 10:01

## 2025-01-11 RX ADMIN — ROSUVASTATIN CALCIUM 10 MG: 5 TABLET, FILM COATED ORAL at 10:01

## 2025-01-11 RX ADMIN — PANTOPRAZOLE SODIUM 40 MG: 40 TABLET, DELAYED RELEASE ORAL at 10:00

## 2025-01-11 RX ADMIN — BENZTROPINE MESYLATE 0.5 MG: 1 TABLET ORAL at 21:17

## 2025-01-11 RX ADMIN — Medication 2000 UNITS: at 10:01

## 2025-01-11 RX ADMIN — PREGABALIN 150 MG: 75 CAPSULE ORAL at 21:17

## 2025-01-11 RX ADMIN — METOPROLOL TARTRATE 25 MG: 25 TABLET, FILM COATED ORAL at 10:01

## 2025-01-11 RX ADMIN — Medication 500 MG: at 10:01

## 2025-01-11 RX ADMIN — LEVOTHYROXINE SODIUM 50 MCG: 50 TABLET ORAL at 10:00

## 2025-01-11 RX ADMIN — FERROUS SULFATE TAB EC 325 MG (65 MG FE EQUIVALENT) 325 MG: 325 (65 FE) TABLET DELAYED RESPONSE at 10:01

## 2025-01-11 RX ADMIN — ALPRAZOLAM 1 MG: 0.5 TABLET ORAL at 22:29

## 2025-01-11 RX ADMIN — PREGABALIN 150 MG: 75 CAPSULE ORAL at 10:01

## 2025-01-11 RX ADMIN — SODIUM CHLORIDE, PRESERVATIVE FREE 10 ML: 5 INJECTION INTRAVENOUS at 21:19

## 2025-01-11 NOTE — DISCHARGE SUMMARY
[facility]  Office: [phone]  Justin Ball DO, Demarcus Dunne DO, Marcello Benítez DO, Farshad Vasques DO, Ragini De Dios MD, Stephany Curry MD, Eliel Orozco MD, Yue Ulrich MD,  Angel Nettles MD, Flores Martínez MD, Waleska Nj MD,  Aida Banda DO, Raúl Garcia MD, Adams Ratliff MD, Berhane Ball DO, Ruby Ferrari MD,  Romeo Box DO, Concepción Mojica MD, Nevaeh Valencia MD, Sallie Galindo MD, Nay Gutierrez MD,  Irbahima Scott MD, Venkat Monterroso MD, Wilton Jara MD, Qi Mcconnell MD, Sami Coley MD, Abdifatah Rivera MD, Mario King DO, Orlando Fong MD, Aida Rajput MD, Mohsin Reza, MD, Shirley Waterhouse, CNP,  Dona Bernabe CNP, Mario Glynn, CNP,  Rehana Green, JENNIFER, Jessica Mendoza, CNP, Ama Hauser, CNP, Capri Schmidt, CNP, Fang Hidalgo, CNP, Beverley Galvan, PA-C, Yareli Abrams PA-C, Lori Kim, CNP, Froylan Deng, CNP,  Toya Kohli, CNP, Janiya Grayson, CNP, Ame Dsouza, CNP,  Maida Michaels, CNP, Armida Farris, CNP         [facility]   IN-PATIENT SERVICE   [facility]    Discharge Summary     Patient ID: Deborah Nuñez  :  1950   MRN: [id number]     ACCOUNT:  [id number]   Patient's PCP: Romeo Montiel MD  Admit Date: 2025   Discharge Date: 2025     Length of Stay: 5  Code Status:  Full Code  Admitting Physician: Abdifatah Rivera MD  Discharge Physician: Abdifatah Rivera MD     Active Discharge Diagnoses:     Hospital Problem Lists:  Principal Problem:    AMS (altered mental status)  Active Problems:    Hypokalemia    Atrial fibrillation (HCC)  Resolved Problems:    * No resolved hospital problems. *      Admission Condition:  fair     Discharged Condition: good    Hospital Stay:     Hospital Course:  Deborah Nuñez is a 74 y.o. female with PMHx chronic A-fib on Xarelto, HLD, hypothyroidism, and CKD stage IIIa who presented with Altered Mental Status and was noted to have hypokalemia.  UA was obtained on  conjunction with any daily progress note from day of discharge.    Discharge plan:     Disposition: To a non-[facility]    Physician Follow Up:     Romeo Montiel MD  [address]  [facility] [address] [phone]    Schedule an appointment as soon as possible for a visit in 1 week(s)         Requiring Further Evaluation/Follow Up POST HOSPITALIZATION/Incidental Findings:     Diet: cardiac diet    Activity: As tolerated    Instructions to Patient:   - Take all medications as prescribed  - Follow up with PCP in 1-2 weeks   - In case of any worsening condition please visit Emergency Room.      Discharge Medications:      Medication List        CONTINUE taking these medications      acetaminophen 500 MG tablet  Commonly known as: TYLENOL  Take 2 tablets by mouth every 8 hours as needed for Pain or Fever     alendronate 35 MG tablet  Commonly known as: FOSAMAX     ALPRAZolam 1 MG tablet  Commonly known as: XANAX     benztropine 0.5 MG tablet  Commonly known as: COGENTIN     calcium carbonate 1500 (600 Ca) MG Tabs tablet     ferrous sulfate 325 (65 Fe) MG tablet  Commonly known as: IRON 325     fexofenadine 180 MG tablet  Commonly known as: ALLEGRA     folic acid 1 MG tablet  Commonly known as: FOLVITE     levothyroxine 50 MCG tablet  Commonly known as: SYNTHROID     metoprolol tartrate 25 MG tablet  Commonly known as: LOPRESSOR  Take 1 tablet by mouth 2 times daily     nystatin 532335 UNIT/GM powder  Commonly known as: MYCOSTATIN  Apply topically 4 times daily below abdominal skin fold and groin area     omeprazole 40 MG delayed release capsule  Commonly known as: PRILOSEC     potassium chloride 20 MEQ packet  Commonly known as: KLOR-CON  Take 20 mEq by mouth daily     pregabalin 150 MG capsule  Commonly known as: LYRICA     Roflumilast 500 MCG tablet  Commonly known as: DALIRESP     rosuvastatin 10 MG tablet  Commonly known as: CRESTOR     vitamin C 500 MG tablet  Commonly known as:

## 2025-01-11 NOTE — PROGRESS NOTES
Physical Therapy  Facility/Department: [address]  Physical Therapy Daily Documentation Note    Name: Deborah Nuñez  : 1950  MRN: [id number]  Date of Service: 2025    Discharge Recommendations:  Patient would benefit from continued therapy after discharge   PT Equipment Recommendations  Equipment Needed: No  Other: has rollator      Patient Diagnosis(es): The encounter diagnosis was Hypokalemia.  Past Medical History:  has a past medical history of A-fib (HCC), Anxiety, Arthritis, Blood transfusion reaction, CAD (coronary artery disease), Carotid stenosis, COPD (chronic obstructive pulmonary disease) (HCC), GERD (gastroesophageal reflux disease), Hepatitis B, History of blood transfusion, Hyperlipidemia, Hypertension, Hypothyroid, Kidney disease, Legally blind, Macular edema, PVD (peripheral vascular disease) (formerly [facility]), Sleep apnea, and Subclavian artery stenosis (formerly [facility]).  Past Surgical History:  has a past surgical history that includes Aorto-femoral Bypass Graft (); tumor removal (Left); Tonsillectomy; Toe amputation (); Cataract removal (Bilateral); Cholecystectomy (2014); Carotid endarterectomy; Cardiac catheterization (10/2012); eye surgery; eye surgery; Carotid endarterectomy (Left, 2019); Carotid endarterectomy (Left, 2019); and vascular surgery (N/A, 2022).    Assessment  Body Structures, Functions, Activity Limitations Requiring Skilled Therapeutic Intervention: Decreased functional mobility ;Decreased ROM;Decreased balance;Decreased endurance;Decreased safe awareness;Decreased strength;Decreased ADL status;Decreased cognition;Decreased posture  Assessment: Pt lives w/ family in mobile home w/ ramp and although pt poor historian, it appears she walks w/out device in home but cruises furniture or uses rollator in home and uses rollator in community. Pt currently requiring CGA for bed mobility with bedrail, CGA transfers, pt ambulated 120ft with rolling walker and CGA/min      AM-PAC - Mobility    AM-PAC Basic Mobility - Inpatient   How much help is needed turning from your back to your side while in a flat bed without using bedrails?: A Little  How much help is needed moving from lying on your back to sitting on the side of a flat bed without using bedrails?: A Little  How much help is needed moving to and from a bed to a chair?: A Little  How much help is needed standing up from a chair using your arms?: A Little  How much help is needed walking in hospital room?: A Little  How much help is needed climbing 3-5 steps with a railing?: Total  AM-PAC Inpatient Mobility Raw Score : 16  AM-PAC Inpatient T-Scale Score : 40.78  Mobility Inpatient CMS 0-100% Score: 54.16  Mobility Inpatient CMS G-Code Modifier : CK              Goals  Short Term Goals  Time Frame for Short Term Goals: 14  Short Term Goal 1: Pt will be modified indep bed mobility.  Short Term Goal 2: Pt will be modified indep transfers with RW.  Short Term Goal 3: Pt will be modified indep 150ft with RW.  Short Term Goal 4: Pt will go up/down 3 step with kieran rails and SBA.       Education  Patient Education  Education Given To: Patient  Education Provided: Role of Therapy;Transfer Training;Plan of Care;Mobility Training;Fall Prevention Strategies;Home Exercise Program  Education Provided Comments: need for assist w/ mobility, safety with rolling walker use; seated LE ex  Education Method: Demonstration;Verbal;Teach Back  Barriers to Learning: Cognition;Hearing;Vision  Education Outcome: Continued education needed;Verbalized understanding;Demonstrated understanding      Therapy Time   Individual Concurrent Group Co-treatment   Time In 0958         Time Out 1030         Minutes 32         Timed Code Treatment Minutes: 32 Minutes       Hellen Santoyo PT

## 2025-01-11 NOTE — PLAN OF CARE
Problem: Discharge Planning  Goal: Discharge to home or other facility with appropriate resources  Outcome: Progressing     Problem: Skin/Tissue Integrity  Goal: Absence of new skin breakdown  Description: 1.  Monitor for areas of redness and/or skin breakdown  2.  Assess vascular access sites hourly  3.  Every 4-6 hours minimum:  Change oxygen saturation probe site  4.  Every 4-6 hours:  If on nasal continuous positive airway pressure, respiratory therapy assess nares and determine need for appliance change or resting period.  Outcome: Progressing     Problem: Safety - Adult  Goal: Free from fall injury  Outcome: Progressing     Problem: ABCDS Injury Assessment  Goal: Absence of physical injury  Outcome: Progressing     Problem: Neurosensory - Adult  Goal: Achieves stable or improved neurological status  Outcome: Progressing     Problem: Metabolic/Fluid and Electrolytes - Adult  Goal: Electrolytes maintained within normal limits  Outcome: Progressing

## 2025-01-11 NOTE — PROGRESS NOTES
Attempted to call [facility] x5 to ensure pt can be transported to facility today. No answer. Voicemail left with admissions department at [phone]

## 2025-01-11 NOTE — PROGRESS NOTES
Occupational Therapy  Occupational Therapy Daily Treatment Note  Facility/Department: [address]   Patient Name: Deborah Nuñez        MRN: [id number]    : 1950    Date of Service: 2025    Chief Complaint   Patient presents with    Altered Mental Status     Pt arrives via ems from home dt co altered mental status per grandson. Per grandson pt does have hx of utis. Per ems pt was delayed on responses with no decifiets for stroke scale. Per ems pt does have prescription for oxycodone at home which were filled 12/10 with most pills still in bottle. Pt was hypertensive en route with afib on monitor pt does have history.      Past Medical History:  has a past medical history of A-fib ([facility]), Anxiety, Arthritis, Blood transfusion reaction, CAD (coronary artery disease), Carotid stenosis, COPD (chronic obstructive pulmonary disease) ([facility]), GERD (gastroesophageal reflux disease), Hepatitis B, History of blood transfusion, Hyperlipidemia, Hypertension, Hypothyroid, Kidney disease, Legally blind, Macular edema, PVD (peripheral vascular disease) ([facility]), Sleep apnea, and Subclavian artery stenosis ([facility]).  Past Surgical History:  has a past surgical history that includes Aorto-femoral Bypass Graft (); tumor removal (Left); Tonsillectomy; Toe amputation (); Cataract removal (Bilateral); Cholecystectomy (2014); Carotid endarterectomy; Cardiac catheterization (10/2012); eye surgery; eye surgery; Carotid endarterectomy (Left, 2019); Carotid endarterectomy (Left, 2019); and vascular surgery (N/A, 2022).    Discharge Recommendations  Discharge Recommendations: Patient would benefit from continued therapy after discharge  OT Equipment Recommendations  Other: CTA    Assessment  Performance deficits / Impairments: Decreased functional mobility ;Decreased cognition;Decreased high-level IADLs;Decreased ADL status;Decreased ROM;Decreased strength;Decreased balance;Decreased safe awareness  Assessment:  Transfers  Toilet Transfers Comments: declined at this time    Functional Mobility: Minimal assistance;Contact guard assistance  Functional Mobility Skilled Clinical Factors: sit<>stand CGA, amb RW MIN especially with turns/maneuverability           Patient Education  Patient Education  Education Given To: Patient  Education Provided: Home Exercise Program;ADL Adaptive Strategies;Energy Conservation;Transfer Training  Education Method: Verbal;Demonstration  Barriers to Learning: Cognition  Education Outcome: Verbalized understanding;Continued education needed    Goals  Patient Goals   Patient goals : I need to eat  Short Term Goals  Time Frame for Short Term Goals: 14  Short Term Goal 1: Complete bed mobility IND  Short Term Goal 2: Complete transfers Mod I w/ LRD and good hand placement with no VC  Short Term Goal 3: Complete Func mob Mod I w/ good safety awareness  Short Term Goal 4: Complete toileting/lower body dressing SUP w/ LRD and AE PRN  Short Term Goal 5: IND demo BUE HEP to increase strength 1MMT grade for increased participation in ADLs/ADL transfers    Plan  Occupational Therapy Plan  Times Per Week: 4-5x/wk  Times Per Day: Once a day  Current Treatment Recommendations: Strengthening, Balance training, Functional mobility training, Patient/Caregiver education & training, Safety education & training, Pain management, Equipment evaluation, education, & procurement, Self-Care / ADL, Home management training    Minutes  OT Individual Minutes  Time In: 1154  Time Out: 1218  Minutes: 24  Time Code Minutes   Timed Code Treatment Minutes: 24 Minutes    Electronically signed by SYMONE COVARRUBIAS OTR/L on 1/11/25 at 1:02 PM EST

## 2025-01-12 PROBLEM — E87.6 HYPOKALEMIA: Status: ACTIVE | Noted: 2025-01-12

## 2025-01-12 PROBLEM — I48.91 ATRIAL FIBRILLATION (HCC): Status: ACTIVE | Noted: 2025-01-12

## 2025-01-12 PROCEDURE — 2060000000 HC ICU INTERMEDIATE R&B

## 2025-01-12 PROCEDURE — 97110 THERAPEUTIC EXERCISES: CPT

## 2025-01-12 PROCEDURE — 97116 GAIT TRAINING THERAPY: CPT

## 2025-01-12 PROCEDURE — 6370000000 HC RX 637 (ALT 250 FOR IP): Performed by: STUDENT IN AN ORGANIZED HEALTH CARE EDUCATION/TRAINING PROGRAM

## 2025-01-12 PROCEDURE — 2500000003 HC RX 250 WO HCPCS: Performed by: STUDENT IN AN ORGANIZED HEALTH CARE EDUCATION/TRAINING PROGRAM

## 2025-01-12 PROCEDURE — 99231 SBSQ HOSP IP/OBS SF/LOW 25: CPT | Performed by: STUDENT IN AN ORGANIZED HEALTH CARE EDUCATION/TRAINING PROGRAM

## 2025-01-12 RX ADMIN — FERROUS SULFATE TAB EC 325 MG (65 MG FE EQUIVALENT) 325 MG: 325 (65 FE) TABLET DELAYED RESPONSE at 10:38

## 2025-01-12 RX ADMIN — ROSUVASTATIN CALCIUM 10 MG: 5 TABLET, FILM COATED ORAL at 10:39

## 2025-01-12 RX ADMIN — PREGABALIN 150 MG: 75 CAPSULE ORAL at 10:38

## 2025-01-12 RX ADMIN — Medication 500 MG: at 10:38

## 2025-01-12 RX ADMIN — PREGABALIN 150 MG: 75 CAPSULE ORAL at 22:42

## 2025-01-12 RX ADMIN — METOPROLOL TARTRATE 25 MG: 25 TABLET, FILM COATED ORAL at 10:38

## 2025-01-12 RX ADMIN — SODIUM CHLORIDE, PRESERVATIVE FREE 10 ML: 5 INJECTION INTRAVENOUS at 10:39

## 2025-01-12 RX ADMIN — TRAMADOL HYDROCHLORIDE 50 MG: 50 TABLET, COATED ORAL at 14:39

## 2025-01-12 RX ADMIN — BENZTROPINE MESYLATE 0.5 MG: 1 TABLET ORAL at 22:42

## 2025-01-12 RX ADMIN — RIVAROXABAN 20 MG: 10 TABLET, FILM COATED ORAL at 10:39

## 2025-01-12 RX ADMIN — Medication 2000 UNITS: at 10:39

## 2025-01-12 RX ADMIN — FOLIC ACID 1 MG: 1 TABLET ORAL at 10:39

## 2025-01-12 RX ADMIN — SODIUM CHLORIDE, PRESERVATIVE FREE 10 ML: 5 INJECTION INTRAVENOUS at 22:43

## 2025-01-12 RX ADMIN — LEVOTHYROXINE SODIUM 50 MCG: 50 TABLET ORAL at 06:43

## 2025-01-12 RX ADMIN — ROFLUMILAST 500 MCG: 500 TABLET ORAL at 10:39

## 2025-01-12 RX ADMIN — BENZTROPINE MESYLATE 0.5 MG: 1 TABLET ORAL at 10:39

## 2025-01-12 RX ADMIN — PANTOPRAZOLE SODIUM 40 MG: 40 TABLET, DELAYED RELEASE ORAL at 06:43

## 2025-01-12 RX ADMIN — METOPROLOL TARTRATE 25 MG: 25 TABLET, FILM COATED ORAL at 22:42

## 2025-01-12 ASSESSMENT — PAIN SCALES - GENERAL
PAINLEVEL_OUTOF10: 5
PAINLEVEL_OUTOF10: 0
PAINLEVEL_OUTOF10: 0

## 2025-01-12 NOTE — PLAN OF CARE
Problem: Discharge Planning  Goal: Discharge to home or other facility with appropriate resources  1/12/2025 0301 by Anette Corona, RN  Outcome: Progressing     Problem: Skin/Tissue Integrity  Goal: Absence of new skin breakdown  Description: 1.  Monitor for areas of redness and/or skin breakdown  2.  Assess vascular access sites hourly  3.  Every 4-6 hours minimum:  Change oxygen saturation probe site  4.  Every 4-6 hours:  If on nasal continuous positive airway pressure, respiratory therapy assess nares and determine need for appliance change or resting period.  1/12/2025 0301 by Anette Corona, RN  Outcome: Progressing     Problem: Safety - Adult  Goal: Free from fall injury  1/12/2025 0301 by Anette Corona RN  Outcome: Progressing     Problem: ABCDS Injury Assessment  Goal: Absence of physical injury  1/12/2025 0301 by Anette Corona RN  Outcome: Progressing     Problem: Neurosensory - Adult  Goal: Achieves stable or improved neurological status  1/12/2025 0301 by Anette Corona, RN  Outcome: Progressing     Problem: Metabolic/Fluid and Electrolytes - Adult  Goal: Electrolytes maintained within normal limits  1/12/2025 0301 by Anette Corona, RN  Outcome: Progressing

## 2025-01-12 NOTE — PROGRESS NOTES
Physical Therapy  Facility/Department: [address]  Physical Therapy Daily Documentation Note    Name: Deborah Nuñez  : 1950  MRN: [id number]  Date of Service: 2025    Discharge Recommendations:  Patient would benefit from continued therapy after discharge   PT Equipment Recommendations  Equipment Needed: No  Other: has rollator      Patient Diagnosis(es): The primary encounter diagnosis was Hypokalemia. A diagnosis of Hypothyroidism, unspecified type was also pertinent to this visit.  Past Medical History:  has a past medical history of A-fib (HCC), Anxiety, Arthritis, Blood transfusion reaction, CAD (coronary artery disease), Carotid stenosis, COPD (chronic obstructive pulmonary disease) (HCC), GERD (gastroesophageal reflux disease), Hepatitis B, History of blood transfusion, Hyperlipidemia, Hypertension, Hypothyroid, Kidney disease, Legally blind, Macular edema, PVD (peripheral vascular disease) ([facility]), Sleep apnea, and Subclavian artery stenosis ([facility]).  Past Surgical History:  has a past surgical history that includes Aorto-femoral Bypass Graft (); tumor removal (Left); Tonsillectomy; Toe amputation (); Cataract removal (Bilateral); Cholecystectomy (2014); Carotid endarterectomy; Cardiac catheterization (10/2012); eye surgery; eye surgery; Carotid endarterectomy (Left, 2019); Carotid endarterectomy (Left, 2019); and vascular surgery (N/A, 2022).    Assessment  Body Structures, Functions, Activity Limitations Requiring Skilled Therapeutic Intervention: Decreased functional mobility ;Decreased ROM;Decreased balance;Decreased endurance;Decreased safe awareness;Decreased strength;Decreased ADL status;Decreased cognition;Decreased posture  Assessment: Pt lives w/ grandson in mobile home w/ ramp and although pt poor historian, it appears she walks w/out device in home but cruises furniture or uses rollator in home and uses rollator in community. Pt currently requiring SBA for

## 2025-01-12 NOTE — CARE COORDINATION
Called [facility] and left a message to see if patient can go to them today.

## 2025-01-12 NOTE — PROGRESS NOTES
[facility]  Office: [phone]  Justin Ball DO, Demarcus Dunne DO, Marcello Benítez DO, Farshad Vasques DO, Ragini De Dios MD, Stephany Curry MD, Eliel Orozco MD, Yue Ulrich MD,  Angel Nettles MD, Flores Martínez MD, Waleska Nj MD,  Aida Banda DO, Raúl Garcia MD, Adams Ratliff MD, Berhane Ball DO, Ruby Ferrari MD,  Romeo Box DO, Concepción Mojica MD, Nevaeh Valencia MD, Sallie Galindo MD, Nay Gutierrez MD,  Ibrahima Scott MD, Venkat Monterroso MD, Wilton Jara MD, Qi Mcconnell MD, Sami Coley MD, Abdifatah Rivera MD, Mario King DO, Orlando Fong MD, Aida Rajput MD, Mohsin Reza, MD, Shirley Waterhouse, CNP,  Dona Bernabe CNP, Mario Glynn, CNP,  Rehana Green, JENNIFER, Jessica Mendoza, CNP, Ama Hauser, CNP, Capri Schmidt, CNP, Fang Hidalgo, CNP, Beverley Galvan, PA-C, Yareli Abrams PA-C, Lori Kim, CNP, Froylan Deng, CNP,  Toya Kohli, CNP, Janiya Grayson, CNP, Ame Dsouza, CNP,  Maida Michaels, JOSE FRANCISCO, Armida Farris, CNP         [facility]   IN-PATIENT SERVICE   [facility]    Progress Note    1/11/2025    8:33 AM    Name:   Deborah Nuñez  MRN:     [id number]     Acct:      [id number]   [facility]   [facility] Day:  4  Admit Date:  1/8/2025 12:55 PM    PCP:   Romeo Montiel MD  Code Status:  Full Code    Subjective:     C/C:   Chief Complaint   Patient presents with    Altered Mental Status     Pt arrives via ems from home dt co altered mental status per grandson. Per grandson pt does have hx of utis. Per ems pt was delayed on responses with no decifiets for stroke scale. Per ems pt does have prescription for oxycodone at home which were filled 12/10 with most pills still in bottle. Pt was hypertensive en route with afib on monitor pt does have history.      Interval History Status: improved.     Patient seen and examined at bedside. Potassium level back at baseline. Patient stable for discharge. Awaiting SNF placement.

## 2025-01-13 VITALS
SYSTOLIC BLOOD PRESSURE: 120 MMHG | WEIGHT: 167.33 LBS | BODY MASS INDEX: 29.65 KG/M2 | TEMPERATURE: 97.9 F | DIASTOLIC BLOOD PRESSURE: 61 MMHG | HEIGHT: 63 IN | RESPIRATION RATE: 18 BRPM | HEART RATE: 103 BPM | OXYGEN SATURATION: 98 %

## 2025-01-13 PROCEDURE — 97530 THERAPEUTIC ACTIVITIES: CPT

## 2025-01-13 PROCEDURE — 6370000000 HC RX 637 (ALT 250 FOR IP): Performed by: STUDENT IN AN ORGANIZED HEALTH CARE EDUCATION/TRAINING PROGRAM

## 2025-01-13 PROCEDURE — 2500000003 HC RX 250 WO HCPCS: Performed by: STUDENT IN AN ORGANIZED HEALTH CARE EDUCATION/TRAINING PROGRAM

## 2025-01-13 PROCEDURE — 97535 SELF CARE MNGMENT TRAINING: CPT

## 2025-01-13 PROCEDURE — 97116 GAIT TRAINING THERAPY: CPT

## 2025-01-13 PROCEDURE — 99239 HOSP IP/OBS DSCHRG MGMT >30: CPT | Performed by: STUDENT IN AN ORGANIZED HEALTH CARE EDUCATION/TRAINING PROGRAM

## 2025-01-13 RX ADMIN — ROSUVASTATIN CALCIUM 10 MG: 5 TABLET, FILM COATED ORAL at 08:44

## 2025-01-13 RX ADMIN — RIVAROXABAN 20 MG: 10 TABLET, FILM COATED ORAL at 08:44

## 2025-01-13 RX ADMIN — PANTOPRAZOLE SODIUM 40 MG: 40 TABLET, DELAYED RELEASE ORAL at 05:57

## 2025-01-13 RX ADMIN — METOPROLOL TARTRATE 25 MG: 25 TABLET, FILM COATED ORAL at 08:45

## 2025-01-13 RX ADMIN — ACETAMINOPHEN 650 MG: 325 TABLET ORAL at 05:57

## 2025-01-13 RX ADMIN — BENZTROPINE MESYLATE 0.5 MG: 1 TABLET ORAL at 08:44

## 2025-01-13 RX ADMIN — Medication 2000 UNITS: at 08:44

## 2025-01-13 RX ADMIN — TRAMADOL HYDROCHLORIDE 50 MG: 50 TABLET, COATED ORAL at 08:49

## 2025-01-13 RX ADMIN — ROFLUMILAST 500 MCG: 500 TABLET ORAL at 08:44

## 2025-01-13 RX ADMIN — LEVOTHYROXINE SODIUM 50 MCG: 50 TABLET ORAL at 05:57

## 2025-01-13 RX ADMIN — PREGABALIN 150 MG: 75 CAPSULE ORAL at 08:45

## 2025-01-13 RX ADMIN — SODIUM CHLORIDE, PRESERVATIVE FREE 10 ML: 5 INJECTION INTRAVENOUS at 08:45

## 2025-01-13 RX ADMIN — Medication 500 MG: at 08:44

## 2025-01-13 RX ADMIN — FOLIC ACID 1 MG: 1 TABLET ORAL at 08:44

## 2025-01-13 RX ADMIN — FERROUS SULFATE TAB EC 325 MG (65 MG FE EQUIVALENT) 325 MG: 325 (65 FE) TABLET DELAYED RESPONSE at 08:45

## 2025-01-13 ASSESSMENT — PAIN SCALES - GENERAL
PAINLEVEL_OUTOF10: 2
PAINLEVEL_OUTOF10: 7

## 2025-01-13 ASSESSMENT — PAIN DESCRIPTION - LOCATION: LOCATION: BACK

## 2025-01-13 ASSESSMENT — PAIN - FUNCTIONAL ASSESSMENT: PAIN_FUNCTIONAL_ASSESSMENT: ACTIVITIES ARE NOT PREVENTED

## 2025-01-13 ASSESSMENT — PAIN DESCRIPTION - DESCRIPTORS: DESCRIPTORS: ACHING

## 2025-01-13 ASSESSMENT — PAIN SCALES - WONG BAKER: WONGBAKER_NUMERICALRESPONSE: HURTS A LITTLE BIT

## 2025-01-13 NOTE — PLAN OF CARE
Problem: Discharge Planning  Goal: Discharge to home or other facility with appropriate resources  1/13/2025 0442 by Anette Corona, RN  Outcome: Progressing     Problem: Skin/Tissue Integrity  Goal: Absence of new skin breakdown  Description: 1.  Monitor for areas of redness and/or skin breakdown  2.  Assess vascular access sites hourly  3.  Every 4-6 hours minimum:  Change oxygen saturation probe site  4.  Every 4-6 hours:  If on nasal continuous positive airway pressure, respiratory therapy assess nares and determine need for appliance change or resting period.  1/13/2025 0442 by Anette Corona, RN  Outcome: Progressing     Problem: Safety - Adult  Goal: Free from fall injury  1/13/2025 0442 by Anette Corona RN  Outcome: Progressing     Problem: ABCDS Injury Assessment  Goal: Absence of physical injury  1/13/2025 0442 by Anette Corona RN  Outcome: Progressing     Problem: Neurosensory - Adult  Goal: Achieves stable or improved neurological status  1/13/2025 0442 by Anette Corona, RN  Outcome: Progressing     Problem: Metabolic/Fluid and Electrolytes - Adult  Goal: Electrolytes maintained within normal limits  1/13/2025 0442 by Anette Corona, RN  Outcome: Progressing

## 2025-01-13 NOTE — CARE COORDINATION
0831: Spoke with Keren at [facility] and they can take patient today.     0835: Met with patient and updated her on discharge plan. Patient states she is going home, and now refusing to go to SNF. States her plan was to always go home, and that everyone else wants her to go to SNF. States she is able to make her own decisions and is going home. Patient reports she has a friend coming to pick her up and has things that need to be done today.     0910: Therapy worked with patient and states she is doing better, and would be okay to go home with [facility].                         Post Acute Facility/Agency List     Provided patient with the following list, the list includes the overall star ratings obtained from CMS per the Medicare Web site (www.Medicare.gov):     [] Long Term Acute Care Facilities  [] Acute Inpatient Rehabilitation Facilities  [] Skilled Nursing Facilities  [] Hospice Facilities  [x] Home Care    Provided verbal instructions on how to utilize the QR Code to obtain additional detailed star ratings from www.Medicare.gov        0955: Spoke with Consuelo and Amada (patients daughters) and notified them of patient refusal for SNF. Discussed continued care after discharge. Patient is agreeable to [facility] with [facility] and is requesting nursing and aide services, as well as PT/OT. Referral sent.     0957: Spoke with Keren at [facility] and notified her of no longer needing SNF placement.     1003: Spoke with Malena at [facility] and they can accept patient.

## 2025-01-13 NOTE — PROGRESS NOTES
[facility]  Office: [phone]  Jusitn Ball DO, Demarcus Dunne DO, Marcello Benítez DO, Farshad Vasques DO, Ragini De Dios MD, Stephany Curry MD, Eliel Orozco MD, Yue Ulrich MD,  Angel Nettles MD, Flores Martínez MD, Waleska Nj MD,  Aida Banda DO, Raúl Garcia MD, Adams Ratliff MD, Berhane Ball DO, Ruby Ferrari MD,  Romeo Box DO, Concepción Mojica MD, Nevaeh Valencia MD, Sallie Galindo MD, Nay Gutierrez MD,  Ibrahima Scott MD, Venkta Monterroso MD, Wilton Jara MD, Qi Mcconnell MD, Sami Coley MD, Abdifatah Rivera MD, Mario King DO, Orlando Fong MD, Aida Rajput MD, Mohsin Reza, MD, Shirley Waterhouse, CNP,  Dona Bernabe CNP, Mario Glynn, CNP,  Rehana Green, JENNIFER, Jessica Mendoza, CNP, Ama Hauser, CNP, Capri Schmidt, CNP, Fang Hidalgo, CNP, Beverley Galvan, PA-C, Yareli Abrams PA-C, Lori Kim, CNP, Froylan Deng, CNP,  Toya Kohli, CNP, Janiya Grayson, CNP, Ame Dsouza, CNP,  Maida Michaels, JOSE FRANCISCO, Armida Farris, CNP         [facility]   IN-PATIENT SERVICE   [facility]    Progress Note    1/12/2025    10:36 AM    Name:   Deborah Nuñez  MRN:     [id number]     Acct:      [id number]   [facility]   [facility]  IP Day:  5  Admit Date:  1/8/2025 12:55 PM    PCP:   Romeo Montiel MD  Code Status:  Full Code    Subjective:     C/C:   Chief Complaint   Patient presents with    Altered Mental Status     Pt arrives via ems from home dt co altered mental status per grandson. Per grandson pt does have hx of utis. Per ems pt was delayed on responses with no decifiets for stroke scale. Per ems pt does have prescription for oxycodone at home which were filled 12/10 with most pills still in bottle. Pt was hypertensive en route with afib on monitor pt does have history.      Interval History Status: improved.     Patient seen and examined at bedside this morning.  No acute events overnight.  Awaiting placement    Brief History:     Deborah

## 2025-01-13 NOTE — PROGRESS NOTES
Physical Therapy  Facility/Department: [address]   Physical Therapy Daily Treatment Note    Patient Name: Deborah Nuñez        MRN: [id number]    : 1950    Date of Service: 2025    Chief Complaint   Patient presents with    Altered Mental Status     Pt arrives via ems from home dt co altered mental status per grandson. Per grandson pt does have hx of utis. Per ems pt was delayed on responses with no decifiets for stroke scale. Per ems pt does have prescription for oxycodone at home which were filled 12/10 with most pills still in bottle. Pt was hypertensive en route with afib on monitor pt does have history.      Past Medical History:  has a past medical history of A-fib ([facility]), Anxiety, Arthritis, Blood transfusion reaction, CAD (coronary artery disease), Carotid stenosis, COPD (chronic obstructive pulmonary disease) ([facility]), GERD (gastroesophageal reflux disease), Hepatitis B, History of blood transfusion, Hyperlipidemia, Hypertension, Hypothyroid, Kidney disease, Legally blind, Macular edema, PVD (peripheral vascular disease) ([facility]), Sleep apnea, and Subclavian artery stenosis ([facility]).  Past Surgical History:  has a past surgical history that includes Aorto-femoral Bypass Graft (); tumor removal (Left); Tonsillectomy; Toe amputation (); Cataract removal (Bilateral); Cholecystectomy (2014); Carotid endarterectomy; Cardiac catheterization (10/2012); eye surgery; eye surgery; Carotid endarterectomy (Left, 2019); Carotid endarterectomy (Left, 2019); and vascular surgery (N/A, 2022).    Discharge Recommendations  Discharge Recommendations: Patient would benefit from continued therapy after discharge  PT Equipment Recommendations  Equipment Needed: No  Other: has rollator    Assessment  Body Structures, Functions, Activity Limitations Requiring Skilled Therapeutic Intervention: Decreased functional mobility ;Decreased balance;Decreased safe awareness;Decreased strength;Decreased ADL  with Assist  Required Braces or Orthoses?: No  Position Activity Restriction  Other Position/Activity Restrictions: legally blind, speech difficulties    Subjective  General  Family/Caregiver Present: No  Follows Commands: Within Functional Limits  Subjective  Subjective: Pt c/o \"uncomfortable beds\" causing right shoulder pain. Pt rated pain as 4/10. Pt left up in recliner and positioned for comfort       Objective  Orientation  Orientation Level: Oriented X4  Cognition  Overall Cognitive Status: Exceptions  Arousal/Alertness: Appears intact  Following Commands: Follows multistep commands with increased time  Attention Span: Appears intact  Memory: Impaired  Safety Judgement: Decreased awareness of need for safety;Decreased awareness of need for assistance  Problem Solving: Decreased awareness of errors;Assistance required to correct errors made;Assistance required to identify errors made;Assistance required to generate solutions  Insights: Decreased awareness of deficits  Initiation: Appears intact  Sequencing: Appears intact                   Mobility   Bed mobility  Supine to Sit: Supervision  Scooting: Supervision  Bed Mobility Comments: HOB flat, no use of bed rail. Noted increase time and effort for supine to sit         Transfers  Sit to Stand: Stand by assistance  Stand to Sit: Stand by assistance  Comment: Slow but controlled ascent    Ambulation  Device: Rolling Walker  Assistance: Stand by assistance  Quality of Gait: Increased base of support with slight increase lateral sway  Gait Deviations: Slow Maya  Distance: 180ft  Comments: pt c/o 2WW not as good as her rollator               Balance   Balance  Comments: Pt stood for ~3' supervision with rolling walker at closet to find pants. Pt sat on side of bed and independently doffed brief and required CGA to don pants           Patient Education  Patient Education  Education Given To: Patient  Education Provided: Role of Therapy;Transfer Training;Plan of

## 2025-01-13 NOTE — DISCHARGE INSTR - COC
Continuity of Care Form    Patient Name: Deborah Nuñez   :  1950  MRN:  [id number]    Admit date:  2025  Discharge date:  25    Code Status Order: Full Code   Advance Directives:   Advance Care Flowsheet Documentation             Admitting Physician:  Abdifatah Rivera MD  PCP: Romeo Montiel MD    Discharging Nurse: ROSIO Squires  Discharging Hospital Unit/Room#: 326/326-01  Discharging Unit Phone Number: [phone]    Emergency Contact:   Extended Emergency Contact Information  Primary Emergency Contact: Consuelo Leon  Home Phone: [phone]  Relation: Child  Secondary Emergency Contact: Erin Meyers  Home Phone: [phone]  Relation: Child    Past Surgical History:  Past Surgical History:   Procedure Laterality Date    AORTO-FEMORAL BYPASS GRAFT      CARDIAC CATHETERIZATION  10/2012    no stents    CAROTID ENDARTERECTOMY      CAROTID ENDARTERECTOMY Left 2019    CAROTID ENDARTERECTOMY LEFT (Left Neck)    CAROTID ENDARTERECTOMY Left 2019    CAROTID ENDARTERECTOMY LEFT performed by Arthur Delos Reyes, MD at [facility] OR    CATARACT REMOVAL Bilateral     CHOLECYSTECTOMY  2014    laparscopic    EYE SURGERY      LASIK LEFT EYE    EYE SURGERY      catarat extraction    TOE AMPUTATION      gangrene     TONSILLECTOMY      TUMOR REMOVAL Left     carotid    VASCULAR SURGERY N/A 2022    FEMORAL EMBOLECTOMY THROMBECTOMY performed by Arthur Delos Reyes, MD at [facility] OR       Immunization History:   Immunization History   Administered Date(s) Administered    COVID-19, MODERNA BLUE border, Primary or Immunocompromised, (age 12y+), IM, 100 mcg/0.5mL 2021, 2021    Influenza A (I2L3-76) Vaccine PF IM 2010    Influenza Virus Vaccine 2012, 2013    Influenza, FLUARIX, FLULAVAL, FLUZONE (age 6 mo+) and AFLURIA, (age 3 y+), Quadv PF, 0.5mL 2015, 2017    Influenza, FLUZONE High Dose (age 65 y+), IM, Quadv, 0.7mL 2020    Influenza, FLUZONE High  Chica Johnson on 1/13/25 at 8:31 AM EST    PHYSICIAN SECTION    Prognosis: Fair    Condition at Discharge: Stable    Rehab Potential (if transferring to Rehab): Good    Recommended Labs or Other Treatments After Discharge:   - Take all medications as prescribed  - Follow up with PCP in 1-2 weeks   - In case of any worsening condition please visit Emergency Room.      Physician Certification: I certify the above information and transfer of Deborah Nuñez  is necessary for the continuing treatment of the diagnosis listed and that she requires Home Care for greater 30 days.     Update Admission H&P: No change in H&P    PHYSICIAN SIGNATURE:  Electronically signed by Abdifatah Rivera MD on 1/13/25 at 12:12 PM EST

## 2025-01-13 NOTE — PROGRESS NOTES
Occupational Therapy Daily Treatment Note  Facility/Department: [address]   Patient Name: Deborah Nuñez        MRN: [id number]    : 1950    Date of Service: 2025    Chief Complaint   Patient presents with    Altered Mental Status     Pt arrives via ems from home dt co altered mental status per grandson. Per grandson pt does have hx of utis. Per ems pt was delayed on responses with no decifiets for stroke scale. Per ems pt does have prescription for oxycodone at home which were filled 12/10 with most pills still in bottle. Pt was hypertensive en route with afib on monitor pt does have history.      Past Medical History:  has a past medical history of A-fib ([facility]), Anxiety, Arthritis, Blood transfusion reaction, CAD (coronary artery disease), Carotid stenosis, COPD (chronic obstructive pulmonary disease) ([facility]), GERD (gastroesophageal reflux disease), Hepatitis B, History of blood transfusion, Hyperlipidemia, Hypertension, Hypothyroid, Kidney disease, Legally blind, Macular edema, PVD (peripheral vascular disease) ([facility]), Sleep apnea, and Subclavian artery stenosis ([facility]).  Past Surgical History:  has a past surgical history that includes Aorto-femoral Bypass Graft (); tumor removal (Left); Tonsillectomy; Toe amputation (); Cataract removal (Bilateral); Cholecystectomy (2014); Carotid endarterectomy; Cardiac catheterization (10/2012); eye surgery; eye surgery; Carotid endarterectomy (Left, 2019); Carotid endarterectomy (Left, 2019); and vascular surgery (N/A, 2022).    Discharge Recommendations  Discharge Recommendations: Patient would benefit from continued therapy after discharge  OT Equipment Recommendations  Equipment Needed: No  Other: has rollator    Assessment  Performance deficits / Impairments: Decreased functional mobility ;Decreased cognition;Decreased high-level IADLs;Decreased ADL status;Decreased ROM;Decreased strength;Decreased balance;Decreased safe  awareness;Decreased endurance  Assessment: Pt seen for OT tx. Pt continued to display decreased ADL IND secondary to deficits noted above. Pt requiring SBA for transfers, SBA-CGA for functional mobility with RW, SBA for LBD task and standing grooming tasks. Pt would benefit from continued OT services while hospitalized and upon d/c to maximize IND and safety with functional tasks with recommended 24 hour supervision initially at discharge.  Prognosis: Good  REQUIRES OT FOLLOW-UP: Yes  Activity Tolerance  Activity Tolerance: Patient Tolerated treatment well  Safety Devices  Type of Devices: Call light within reach;Chair alarm in place;Gait belt;Patient at risk for falls;Left in chair;Nurse notified  Restraints  Restraints Initially in Place: No    AM-PAC  AM-PAC Daily Activity - Inpatient   How much help is needed for putting on and taking off regular lower body clothing?: A Little  How much help is needed for bathing (which includes washing, rinsing, drying)?: A Little  How much help is needed for toileting (which includes using toilet, bedpan, or urinal)?: A Little  How much help is needed for putting on and taking off regular upper body clothing?: A Little  How much help is needed for taking care of personal grooming?: A Little  How much help for eating meals?: A Little  AM-PAC Inpatient Daily Activity Raw Score: 18  AM-PAC Inpatient ADL T-Scale Score : 38.66  ADL Inpatient CMS 0-100% Score: 46.65  ADL Inpatient CMS G-Code Modifier : CK    Restrictions/Precautions  Restrictions/Precautions  Restrictions/Precautions: General Precautions;Bed Alarm;Fall Risk  Activity Level: Up with Assist  Required Braces or Orthoses?: No  Position Activity Restriction  Other Position/Activity Restrictions: legally blind, speech difficulties    Subjective  General  Family / Caregiver Present: No  Subjective  Subjective: \"I need new pants, I peed myself\"  General Comment  Comments: OK for OT tx this date. Pt denies

## 2025-01-13 NOTE — PROGRESS NOTES
Pts RASHAD Cordero and Erin notified of pt's intent to have friend Brit pick her up and take her home. Brit on speaker phone with pt during RN med pass. RN requested Brit not pick pt up unless she was committed to providing 24 hour care, as pt requires. Pt's dts state they will speak with Brit and their mom to prevent this from happening

## 2025-02-06 ENCOUNTER — HOSPITAL ENCOUNTER (OUTPATIENT)
Age: 75
Setting detail: SPECIMEN
Discharge: HOME | End: 2025-02-06

## 2025-02-06 LAB
ANION GAP SERPL CALCULATED.3IONS-SCNC: 12 MMOL/L (ref 9–16)
BUN SERPL-MCNC: 11 MG/DL (ref 8–23)
CALCIUM SERPL-MCNC: 8.5 MG/DL (ref 8.6–10.4)
CHLORIDE SERPL-SCNC: 103 MMOL/L (ref 98–107)
CHOLEST SERPL-MCNC: 93 MG/DL (ref 0–199)
CHOLESTEROL/HDL RATIO: 2.3
CO2 SERPL-SCNC: 24 MMOL/L (ref 20–31)
CREAT SERPL-MCNC: 1.2 MG/DL (ref 0.6–0.9)
ERYTHROCYTE [DISTWIDTH] IN BLOOD BY AUTOMATED COUNT: 14.3 % (ref 11.8–14.4)
GFR, ESTIMATED: 48 ML/MIN/1.73M2
GLUCOSE SERPL-MCNC: 138 MG/DL (ref 74–99)
HCT VFR BLD AUTO: 33.1 % (ref 36.3–47.1)
HDLC SERPL-MCNC: 41 MG/DL
HGB BLD-MCNC: 10.3 G/DL (ref 11.9–15.1)
LDLC SERPL CALC-MCNC: 36 MG/DL (ref 0–100)
MAGNESIUM SERPL-MCNC: 1.5 MG/DL (ref 1.6–2.4)
MCH RBC QN AUTO: 30.4 PG (ref 25.2–33.5)
MCHC RBC AUTO-ENTMCNC: 31.1 G/DL (ref 28.4–34.8)
MCV RBC AUTO: 97.6 FL (ref 82.6–102.9)
NRBC BLD-RTO: 0 PER 100 WBC
PLATELET # BLD AUTO: 295 K/UL (ref 138–453)
PMV BLD AUTO: 10.6 FL (ref 8.1–13.5)
POTASSIUM SERPL-SCNC: 4 MMOL/L (ref 3.7–5.3)
RBC # BLD AUTO: 3.39 M/UL (ref 3.95–5.11)
SODIUM SERPL-SCNC: 139 MMOL/L (ref 136–145)
T3FREE SERPL-MCNC: 2.1 PG/ML (ref 2–4.4)
T4 FREE SERPL-MCNC: 0.6 NG/DL (ref 0.9–1.7)
TRIGL SERPL-MCNC: 79 MG/DL (ref 0–149)
TSH SERPL DL<=0.05 MIU/L-ACNC: 21 UIU/ML (ref 0.27–4.2)
VLDLC SERPL CALC-MCNC: 16 MG/DL (ref 1–30)
WBC OTHER # BLD: 10.9 K/UL (ref 3.5–11.3)

## 2025-02-06 PROCEDURE — 80048 BASIC METABOLIC PNL TOTAL CA: CPT

## 2025-02-06 PROCEDURE — 80061 LIPID PANEL: CPT

## 2025-02-06 PROCEDURE — 84443 ASSAY THYROID STIM HORMONE: CPT

## 2025-02-06 PROCEDURE — 84439 ASSAY OF FREE THYROXINE: CPT

## 2025-02-06 PROCEDURE — 85027 COMPLETE CBC AUTOMATED: CPT

## 2025-02-06 PROCEDURE — 84481 FREE ASSAY (FT-3): CPT

## 2025-02-06 PROCEDURE — 83735 ASSAY OF MAGNESIUM: CPT

## 2025-02-06 PROCEDURE — 36415 COLL VENOUS BLD VENIPUNCTURE: CPT

## 2025-02-25 ENCOUNTER — HOSPITAL ENCOUNTER (OUTPATIENT)
Age: 75
Setting detail: SPECIMEN
Discharge: HOME | End: 2025-02-25

## 2025-03-29 ENCOUNTER — HOSPITAL ENCOUNTER (EMERGENCY)
Age: 75
Discharge: HOME | End: 2025-03-29
Attending: EMERGENCY MEDICINE
Payer: MEDICAID

## 2025-03-29 VITALS
SYSTOLIC BLOOD PRESSURE: 152 MMHG | WEIGHT: 157 LBS | TEMPERATURE: 98.1 F | OXYGEN SATURATION: 98 % | RESPIRATION RATE: 18 BRPM | HEIGHT: 63 IN | DIASTOLIC BLOOD PRESSURE: 83 MMHG | HEART RATE: 92 BPM | BODY MASS INDEX: 27.82 KG/M2

## 2025-03-29 DIAGNOSIS — R31.9 URINARY TRACT INFECTION WITH HEMATURIA, SITE UNSPECIFIED: Primary | ICD-10-CM

## 2025-03-29 DIAGNOSIS — N39.0 URINARY TRACT INFECTION WITH HEMATURIA, SITE UNSPECIFIED: Primary | ICD-10-CM

## 2025-03-29 LAB
BACTERIA URNS QL MICRO: ABNORMAL
BILIRUB UR QL STRIP: NEGATIVE
CHARACTER UR: ABNORMAL
CLARITY UR: CLEAR
COLOR UR: YELLOW
EPI CELLS #/AREA URNS HPF: ABNORMAL /HPF (ref 0–5)
GLUCOSE UR STRIP-MCNC: NEGATIVE MG/DL
HGB UR QL STRIP.AUTO: ABNORMAL
KETONES UR STRIP-MCNC: NEGATIVE MG/DL
LEUKOCYTE ESTERASE UR QL STRIP: ABNORMAL
NITRITE UR QL STRIP: NEGATIVE
PH UR STRIP: 6 [PH] (ref 5–8)
PROT UR STRIP-MCNC: NEGATIVE MG/DL
RBC #/AREA URNS HPF: ABNORMAL /HPF (ref 0–2)
SP GR UR STRIP: 1.01 (ref 1–1.03)
UROBILINOGEN UR STRIP-ACNC: NORMAL EU/DL (ref 0–1)
WBC #/AREA URNS HPF: ABNORMAL /HPF (ref 0–5)

## 2025-03-29 PROCEDURE — 6360000002 HC RX W HCPCS: Performed by: EMERGENCY MEDICINE

## 2025-03-29 PROCEDURE — 99284 EMERGENCY DEPT VISIT MOD MDM: CPT

## 2025-03-29 PROCEDURE — 87077 CULTURE AEROBIC IDENTIFY: CPT

## 2025-03-29 PROCEDURE — 87186 SC STD MICRODIL/AGAR DIL: CPT

## 2025-03-29 PROCEDURE — 6360000002 HC RX W HCPCS

## 2025-03-29 PROCEDURE — 81001 URINALYSIS AUTO W/SCOPE: CPT

## 2025-03-29 PROCEDURE — 87086 URINE CULTURE/COLONY COUNT: CPT

## 2025-03-29 PROCEDURE — 96372 THER/PROPH/DIAG INJ SC/IM: CPT

## 2025-03-29 RX ORDER — LIDOCAINE HYDROCHLORIDE 10 MG/ML
INJECTION, SOLUTION EPIDURAL; INFILTRATION; INTRACAUDAL; PERINEURAL
Status: COMPLETED
Start: 2025-03-29 | End: 2025-03-29

## 2025-03-29 RX ORDER — CEFPODOXIME PROXETIL 100 MG/1
100 TABLET, FILM COATED ORAL 2 TIMES DAILY
Qty: 14 TABLET | Refills: 0 | Status: SHIPPED | OUTPATIENT
Start: 2025-03-29 | End: 2025-04-05

## 2025-03-29 RX ORDER — CEFTRIAXONE 1 G/1
1000 INJECTION, POWDER, FOR SOLUTION INTRAMUSCULAR; INTRAVENOUS ONCE
Status: COMPLETED | OUTPATIENT
Start: 2025-03-29 | End: 2025-03-29

## 2025-03-29 RX ADMIN — CEFTRIAXONE SODIUM 1000 MG: 1 INJECTION, POWDER, FOR SOLUTION INTRAMUSCULAR; INTRAVENOUS at 11:29

## 2025-03-29 RX ADMIN — LIDOCAINE HYDROCHLORIDE 2 ML: 10 INJECTION, SOLUTION EPIDURAL; INFILTRATION; INTRACAUDAL; PERINEURAL at 11:29

## 2025-03-29 ASSESSMENT — PAIN - FUNCTIONAL ASSESSMENT
PAIN_FUNCTIONAL_ASSESSMENT: NONE - DENIES PAIN
PAIN_FUNCTIONAL_ASSESSMENT: NONE - DENIES PAIN

## 2025-03-29 ASSESSMENT — ENCOUNTER SYMPTOMS
SHORTNESS OF BREATH: 0
EYE PAIN: 0
NAUSEA: 0
ABDOMINAL PAIN: 0
VOMITING: 0
RHINORRHEA: 0
COUGH: 0
DIARRHEA: 0
SORE THROAT: 0

## 2025-03-29 ASSESSMENT — LIFESTYLE VARIABLES
HOW OFTEN DO YOU HAVE A DRINK CONTAINING ALCOHOL: NEVER
HOW MANY STANDARD DRINKS CONTAINING ALCOHOL DO YOU HAVE ON A TYPICAL DAY: PATIENT DOES NOT DRINK

## 2025-03-29 NOTE — ED PROVIDER NOTES
HISTORY      has a past surgical history that includes Aorto-femoral Bypass Graft (2001); tumor removal (Left); Tonsillectomy; Toe amputation (2001); Cataract removal (Bilateral); Cholecystectomy (05/05/2014); Carotid endarterectomy; Cardiac catheterization (10/2012); eye surgery; eye surgery; Carotid endarterectomy (Left, 05/16/2019); Carotid endarterectomy (Left, 5/16/2019); and vascular surgery (N/A, 9/23/2022).    CURRENT MEDICATIONS       Previous Medications    ACETAMINOPHEN (TYLENOL) 500 MG TABLET    Take 2 tablets by mouth every 8 hours as needed for Pain or Fever    ALBUTEROL (PROVENTIL HFA;VENTOLIN HFA) 108 (90 BASE) MCG/ACT INHALER    Inhale 2 puffs into the lungs every 4 hours as needed for Wheezing    ALENDRONATE (FOSAMAX) 35 MG TABLET    Take 1 tablet by mouth every 7 days    ALPRAZOLAM (XANAX) 1 MG TABLET    Take 1 tablet by mouth nightly as needed for Sleep.    BENZTROPINE (COGENTIN) 0.5 MG TABLET    Take 1 tablet by mouth 2 times daily    CALCIUM CARBONATE 1500 (600 CA) MG TABS TABLET        CHOLECALCIFEROL (VITAMIN D) 2000 UNITS TABS    Take  by mouth.    FERROUS SULFATE (IRON 325) 325 (65 FE) MG TABLET    Take 1 tablet by mouth daily    FEXOFENADINE (ALLEGRA) 180 MG TABLET    Take 1 tablet by mouth daily    FOLIC ACID (FOLVITE) 1 MG TABLET    Take 1 tablet by mouth daily    LEVOTHYROXINE (SYNTHROID) 50 MCG TABLET    Take 1 tablet by mouth Daily    METOPROLOL TARTRATE (LOPRESSOR) 25 MG TABLET    Take 1 tablet by mouth 2 times daily    NYSTATIN (MYCOSTATIN) 083960 UNIT/GM POWDER    Apply topically 4 times daily below abdominal skin fold and groin area    OMEPRAZOLE (PRILOSEC) 40 MG DELAYED RELEASE CAPSULE    Take 1 capsule by mouth daily    POTASSIUM CHLORIDE (KLOR-CON) 20 MEQ PACKET    Take 20 mEq by mouth daily    PREGABALIN (LYRICA) 150 MG CAPSULE    Take 1 capsule by mouth 2 times daily.    ROFLUMILAST (DALIRESP) 500 MCG TABLET    Take 1 tablet by mouth daily    ROSUVASTATIN (CRESTOR) 10 MG  injection large joint left  Result Date: 3/24/2025  FL GUIDED ASPIRATION OR INJECTION LARGE JOINT LEFT  3/24/2025 1:07 PM CLINICAL INDICATIONS: Chronic left shoulder pain. Pain block procedure CONTRAST: Omnipaque 240 Procedure: Risks, benefits, indications, and alternatives to the procedure were explained to the patient. Risks include bleeding and infection. All questions were answered. Both written and verbal informed consent was obtained. Patient was placed in the supine position on the fluoroscopic table. Suitable site was marked using fluoroscopic guidance and overlying skin prepped and draped in usual sterile fashion. Lidocaine 1% was used for local and deep anesthesia. A 20-gauge 3-1/2 inch spinal needle was advanced under fluoroscopic guidance. One mL of Omnipaque 240 was used to confirm successful positioning within the left shoulder joint. A mixture of 40 mg of Kenalog and lidocaine was administered into the joint. Patient reports pain of 6/10 at the start of the procedure and significant improvement with decrease in pain level down to 2/10 at the conclusion. 8 seconds of fluoroscopy time is used. 4 fluoroscopic images are saved. Reference air kerma is 2.507 mGy There was no complication. Needle was removed and a sterile bandage applied.  Patient tolerated the procedure well.    Successful left shoulder arthrogram and pain block procedure with symptom improvement documented. No complications and minimal, less than 5mL bleeding. Electronically signed: Yumiko Sanford MD.    FL guided aspiration or injection large joint right  Result Date: 3/24/2025  FL GUIDED ASPIRATION OR INJECTION LARGE JOINT RIGHT  3/24/2025 1:06 PM CLINICAL INDICATIONS: Right shoulder pain. Pain block procedure is requested for symptomatic relief. CONTRAST: Omnipaque 240 Procedure: Risks, benefits, indications, and alternatives to the procedure were explained to the patient. Risks include bleeding and infection. All questions were

## 2025-03-29 NOTE — DISCHARGE INSTRUCTIONS
PLEASE RETURN TO THE EMERGENCY DEPARTMENT IMMEDIATELY if your symptoms worsen in anyway or in 1-2 days if not improved for re-evaluation.  You should immediately return to the ER for symptoms such as abdominal or back pain, fever, a feeling of passing out, light headed, dizziness, chest pain, shortness of breath, persistent nausea and/or vomiting, numbness or weakness to the arms or legs, coolness or color change of the arms or legs.      Take your medication as indicated and prescribed.  If you are given an antibiotic then, make sure you get the prescription filled and take the antibiotics until finished.  You should encourage fluids.    Please understand that at this time there is no evidence for a more serious underlying process, but that early in the process of an illness or injury, an emergency department workup can be falsely reassuring.  You should contact your family doctor within the next 24 hours for a follow up appointment    THANK YOU!!!    From [facility] and [facility]    On behalf of the Emergency Department staff at [facility], I would like to thank you for giving us the opportunity to address your health care needs and concerns.    We hope that during your visit, our service was delivered in a professional and caring manner. Please keep [facility] in mind as we walk with you down the path to your own personal wellness.     Please expect an automated text message or email from us so we can ask a few questions about your health and progress. Based on your answers, a clinician may call you back to offer help and instructions.    Please understand that early in the process of an illness or injury, an emergency department workup can be falsely reassuring.  If you notice any worsening, changing or persistent symptoms please call your family doctor or return to the ER immediately.     Tell us how we did during your visit at [email or web address]   and let us know about

## 2025-03-31 ENCOUNTER — RESULTS FOLLOW-UP (OUTPATIENT)
Dept: EMERGENCY DEPT | Age: 75
End: 2025-03-31

## 2025-04-01 LAB
MICROORGANISM SPEC CULT: ABNORMAL
SPECIMEN DESCRIPTION: ABNORMAL

## 2025-05-01 ENCOUNTER — HOSPITAL ENCOUNTER (EMERGENCY)
Age: 75
Discharge: HOME | End: 2025-05-01
Attending: EMERGENCY MEDICINE
Payer: MEDICARE

## 2025-05-01 VITALS
HEIGHT: 63 IN | RESPIRATION RATE: 10 BRPM | BODY MASS INDEX: 29.59 KG/M2 | DIASTOLIC BLOOD PRESSURE: 54 MMHG | SYSTOLIC BLOOD PRESSURE: 165 MMHG | TEMPERATURE: 98.6 F | OXYGEN SATURATION: 93 % | HEART RATE: 99 BPM | WEIGHT: 167 LBS

## 2025-05-01 DIAGNOSIS — R25.1 HAS A TREMOR: ICD-10-CM

## 2025-05-01 DIAGNOSIS — R53.83 OTHER FATIGUE: Primary | ICD-10-CM

## 2025-05-01 LAB
ANION GAP SERPL CALCULATED.3IONS-SCNC: 13 MMOL/L (ref 9–16)
BASOPHILS # BLD: 0.05 K/UL (ref 0–0.2)
BASOPHILS NFR BLD: 1 % (ref 0–2)
BUN SERPL-MCNC: 6 MG/DL (ref 8–23)
CALCIUM SERPL-MCNC: 8.9 MG/DL (ref 8.8–10.2)
CHLORIDE SERPL-SCNC: 104 MMOL/L (ref 98–107)
CO2 SERPL-SCNC: 24 MMOL/L (ref 20–31)
CREAT SERPL-MCNC: 1.1 MG/DL (ref 0.5–0.9)
EOSINOPHIL # BLD: <0.03 K/UL (ref 0–0.44)
EOSINOPHILS RELATIVE PERCENT: 0 % (ref 1–4)
ERYTHROCYTE [DISTWIDTH] IN BLOOD BY AUTOMATED COUNT: 14.8 % (ref 11.8–14.4)
GFR, ESTIMATED: 55 ML/MIN/1.73M2
GLUCOSE SERPL-MCNC: 125 MG/DL (ref 82–115)
HCT VFR BLD AUTO: 34 % (ref 36.3–47.1)
HGB BLD-MCNC: 11.3 G/DL (ref 11.9–15.1)
IMM GRANULOCYTES # BLD AUTO: 0.02 K/UL (ref 0–0.3)
IMM GRANULOCYTES NFR BLD: 0 %
LYMPHOCYTES NFR BLD: 1.43 K/UL (ref 1.1–3.7)
LYMPHOCYTES RELATIVE PERCENT: 18 % (ref 24–43)
MCH RBC QN AUTO: 31 PG (ref 25.2–33.5)
MCHC RBC AUTO-ENTMCNC: 33.2 G/DL (ref 28.4–34.8)
MCV RBC AUTO: 93.2 FL (ref 82.6–102.9)
MONOCYTES NFR BLD: 0.46 K/UL (ref 0.1–1.2)
MONOCYTES NFR BLD: 6 % (ref 3–12)
NEUTROPHILS NFR BLD: 75 % (ref 36–65)
NEUTS SEG NFR BLD: 5.84 K/UL (ref 1.5–8.1)
NRBC BLD-RTO: 0 PER 100 WBC
PLATELET # BLD AUTO: 236 K/UL (ref 138–453)
PMV BLD AUTO: 10.3 FL (ref 8.1–13.5)
POTASSIUM SERPL-SCNC: 3.3 MMOL/L (ref 3.7–5.3)
RBC # BLD AUTO: 3.65 M/UL (ref 3.95–5.11)
RBC # BLD: ABNORMAL 10*6/UL
SODIUM SERPL-SCNC: 141 MMOL/L (ref 136–145)
WBC OTHER # BLD: 7.8 K/UL (ref 3.5–11.3)

## 2025-05-01 PROCEDURE — 99283 EMERGENCY DEPT VISIT LOW MDM: CPT

## 2025-05-01 PROCEDURE — 36415 COLL VENOUS BLD VENIPUNCTURE: CPT

## 2025-05-01 PROCEDURE — 80048 BASIC METABOLIC PNL TOTAL CA: CPT

## 2025-05-01 PROCEDURE — 85025 COMPLETE CBC W/AUTO DIFF WBC: CPT

## 2025-05-01 ASSESSMENT — PAIN - FUNCTIONAL ASSESSMENT: PAIN_FUNCTIONAL_ASSESSMENT: NONE - DENIES PAIN

## 2025-05-01 NOTE — DISCHARGE INSTRUCTIONS
I have only recommend discussion with your pharmacy regarding medications; administration and use.

## 2025-05-01 NOTE — ED PROVIDER NOTES
EMERGENCY DEPARTMENT ENCOUNTER    Pt Name: Deborah Nuñez  MRN: [id number]  Birthdate 1950  Date of evaluation: 5/1/25  CHIEF COMPLAINT       Chief Complaint   Patient presents with    Drug Overdose     Possible home med overdose. Pt was at nephrologist office and they thought pt was acting different and slightly confused, called EMS. Pt took blister pack of meds and then had 4 bottles of meds and pt took 1 tab from each bottle. Pt does not know what the meds were.      HISTORY OF PRESENT ILLNESS   74-year-old female presents to the emergency room after acting abnormally at nephrologist office.  Patient was reportedly acting confused at the office prompting them to have the patient brought here to the emergency room.  Patient reports some confusion with medications at home.  She is unsure of her med list.  She reports that she has blister packets for a number of her meds that she takes as instructed.  She was recently prescribed new medication that came in a bottle with no instructions.  She took some of these this morning but does not know what they are.  Patient states no one is able to bring her medications here to the emergency room.             REVIEW OF SYSTEMS     Review of Systems   Neurological:  Positive for tremors.     PASTMEDICAL HISTORY     Past Medical History:   Diagnosis Date    A-fib (HCC)     Anxiety     Arthritis     Blood transfusion reaction     2001 with surgery    CAD (coronary artery disease)     Carotid stenosis     COPD (chronic obstructive pulmonary disease) (HCC)     GERD (gastroesophageal reflux disease)     Hepatitis B 1972    History of blood transfusion 2001    Hyperlipidemia     Hypertension     Hypothyroid     Kidney disease     GFR 60, with mass lower left chamber and right upper   Stage 3    Legally blind     patient gets eye appointment    Macular edema 2013    eye injections    PVD (peripheral vascular disease)     Sleep apnea     CPAP    Subclavian artery stenosis      Past

## 2025-06-08 ENCOUNTER — HOSPITAL ENCOUNTER (EMERGENCY)
Age: 75
Discharge: HOME | End: 2025-06-08
Attending: EMERGENCY MEDICINE
Payer: MEDICARE

## 2025-06-08 ENCOUNTER — APPOINTMENT (OUTPATIENT)
Dept: CT IMAGING | Age: 75
End: 2025-06-08
Payer: MEDICARE

## 2025-06-08 ENCOUNTER — APPOINTMENT (OUTPATIENT)
Dept: GENERAL RADIOLOGY | Age: 75
End: 2025-06-08
Payer: MEDICARE

## 2025-06-08 VITALS
RESPIRATION RATE: 18 BRPM | WEIGHT: 163.14 LBS | DIASTOLIC BLOOD PRESSURE: 74 MMHG | HEART RATE: 94 BPM | SYSTOLIC BLOOD PRESSURE: 158 MMHG | HEIGHT: 63 IN | BODY MASS INDEX: 28.91 KG/M2 | TEMPERATURE: 98.1 F | OXYGEN SATURATION: 100 %

## 2025-06-08 DIAGNOSIS — L03.90 CELLULITIS, UNSPECIFIED CELLULITIS SITE: ICD-10-CM

## 2025-06-08 DIAGNOSIS — R42 DIZZINESS: Primary | ICD-10-CM

## 2025-06-08 LAB
ANION GAP SERPL CALCULATED.3IONS-SCNC: 13 MMOL/L (ref 9–16)
BACTERIA URNS QL MICRO: ABNORMAL
BASOPHILS # BLD: 0.1 K/UL (ref 0–0.2)
BASOPHILS NFR BLD: 1 % (ref 0–2)
BILIRUB UR QL STRIP: NEGATIVE
BUN SERPL-MCNC: 5 MG/DL (ref 8–23)
CALCIUM SERPL-MCNC: 9.1 MG/DL (ref 8.6–10.4)
CHARACTER UR: ABNORMAL
CHLORIDE SERPL-SCNC: 103 MMOL/L (ref 98–107)
CLARITY UR: CLEAR
CO2 SERPL-SCNC: 24 MMOL/L (ref 20–31)
COLOR UR: YELLOW
CREAT SERPL-MCNC: 0.8 MG/DL (ref 0.6–0.9)
EOSINOPHIL # BLD: 0.1 K/UL (ref 0–0.4)
EOSINOPHILS RELATIVE PERCENT: 1 % (ref 1–4)
EPI CELLS #/AREA URNS HPF: ABNORMAL /HPF (ref 0–5)
ERYTHROCYTE [DISTWIDTH] IN BLOOD BY AUTOMATED COUNT: 14.4 % (ref 12.5–15.4)
GFR, ESTIMATED: 78 ML/MIN/1.73M2
GLUCOSE SERPL-MCNC: 109 MG/DL (ref 74–99)
GLUCOSE UR STRIP-MCNC: NEGATIVE MG/DL
HCT VFR BLD AUTO: 32.3 % (ref 36–46)
HGB BLD-MCNC: 10.7 G/DL (ref 12–16)
HGB UR QL STRIP.AUTO: ABNORMAL
KETONES UR STRIP-MCNC: NEGATIVE MG/DL
LEUKOCYTE ESTERASE UR QL STRIP: ABNORMAL
LYMPHOCYTES NFR BLD: 1.9 K/UL (ref 1–4.8)
LYMPHOCYTES RELATIVE PERCENT: 26 % (ref 24–44)
MCH RBC QN AUTO: 30.5 PG (ref 26–34)
MCHC RBC AUTO-ENTMCNC: 33.1 G/DL (ref 31–37)
MCV RBC AUTO: 92.1 FL (ref 80–100)
MONOCYTES NFR BLD: 0.7 K/UL (ref 0.1–1.2)
MONOCYTES NFR BLD: 9 % (ref 2–11)
NEUTROPHILS NFR BLD: 63 % (ref 36–66)
NEUTS SEG NFR BLD: 4.8 K/UL (ref 1.8–7.7)
NITRITE UR QL STRIP: NEGATIVE
PH UR STRIP: 6 [PH] (ref 5–8)
PLATELET # BLD AUTO: 273 K/UL (ref 140–450)
PMV BLD AUTO: 8.5 FL (ref 6–12)
POTASSIUM SERPL-SCNC: 3 MMOL/L (ref 3.7–5.3)
PROT UR STRIP-MCNC: NEGATIVE MG/DL
RBC # BLD AUTO: 3.51 M/UL (ref 4–5.2)
RBC #/AREA URNS HPF: ABNORMAL /HPF (ref 0–2)
SODIUM SERPL-SCNC: 140 MMOL/L (ref 136–145)
SP GR UR STRIP: <1.005 (ref 1–1.03)
TROPONIN I SERPL HS-MCNC: 27 NG/L (ref 0–14)
TROPONIN I SERPL HS-MCNC: 29 NG/L (ref 0–14)
UROBILINOGEN UR STRIP-ACNC: NORMAL EU/DL (ref 0–1)
WBC #/AREA URNS HPF: ABNORMAL /HPF (ref 0–5)
WBC OTHER # BLD: 7.6 K/UL (ref 3.5–11)

## 2025-06-08 PROCEDURE — 99285 EMERGENCY DEPT VISIT HI MDM: CPT | Performed by: EMERGENCY MEDICINE

## 2025-06-08 PROCEDURE — 72125 CT NECK SPINE W/O DYE: CPT

## 2025-06-08 PROCEDURE — 71045 X-RAY EXAM CHEST 1 VIEW: CPT

## 2025-06-08 PROCEDURE — 70450 CT HEAD/BRAIN W/O DYE: CPT

## 2025-06-08 PROCEDURE — 84484 ASSAY OF TROPONIN QUANT: CPT

## 2025-06-08 PROCEDURE — 6370000000 HC RX 637 (ALT 250 FOR IP)

## 2025-06-08 PROCEDURE — 93005 ELECTROCARDIOGRAM TRACING: CPT

## 2025-06-08 PROCEDURE — 85025 COMPLETE CBC W/AUTO DIFF WBC: CPT

## 2025-06-08 PROCEDURE — 80048 BASIC METABOLIC PNL TOTAL CA: CPT

## 2025-06-08 PROCEDURE — 81001 URINALYSIS AUTO W/SCOPE: CPT

## 2025-06-08 RX ORDER — CEPHALEXIN 500 MG/1
500 CAPSULE ORAL 2 TIMES DAILY
Qty: 14 CAPSULE | Refills: 0 | Status: SHIPPED | OUTPATIENT
Start: 2025-06-08 | End: 2025-06-15

## 2025-06-08 RX ORDER — POTASSIUM CHLORIDE 1500 MG/1
40 TABLET, EXTENDED RELEASE ORAL ONCE
Status: COMPLETED | OUTPATIENT
Start: 2025-06-08 | End: 2025-06-08

## 2025-06-08 RX ADMIN — POTASSIUM CHLORIDE 40 MEQ: 1500 TABLET, EXTENDED RELEASE ORAL at 15:54

## 2025-06-08 ASSESSMENT — PAIN - FUNCTIONAL ASSESSMENT: PAIN_FUNCTIONAL_ASSESSMENT: NONE - DENIES PAIN

## 2025-06-08 NOTE — ED PROVIDER NOTES
[facility] EMERGENCY DEPARTMENT     Emergency Department     Faculty Attestation        I performed a history and physical examination of the patient and discussed management with the resident. I reviewed the resident’s note and agree with the documented findings and plan of care. Any areas of disagreement are noted on the chart. I was personally present for the key portions of any procedures. I have documented in the chart those procedures where I was not present during the key portions. I have reviewed the emergency nurses triage note. I agree with the chief complaint, past medical history, past surgical history, allergies, medications, social and family history as documented unless otherwise noted below. Documentation of the HPI, Physical Exam and Medical Decision Making performed by medical students or scribes is based on my personal performance of the HPI, PE and MDM. For Physician Assistant/ Nurse Practitioner cases/documentation I have have had a face to face evaluation with this patient and have completed at least one if not all key elements of the E/M (history, physical exam, and MDM). Additional findings are as noted.    Vital Signs: BP (!) 158/74   Pulse 94   Temp 98.1 °F (36.7 °C) (Oral)   Resp 18   Ht 1.6 m (5' 2.99\")   Wt 74 kg (163 lb 2.3 oz)   SpO2 100%   BMI 28.91 kg/m²   PCP:  Romeo Montiel MD    Pertinent Comments:     History: This is a 74-year-old female who relates that she has been having dizziness for the past few days and had a fall couple days ago.  She has been using the walker but feels like she has stumbled.  She does have some skin tears to her right arm and bruising in multiple stages.  She complains of dizziness and nearly passing out at home.  She denies any chest pain or shortness of breath.  No abdominal pain, nausea or vomiting.  No tingling or numbness to the extremities.  No new neck or back pain.    Exam: Patient does 
COURSE:        CONSULTS:   None    CRITICAL CARE:   [None]    PROCEDURES:   [None]    DISPOSITION NOTE:       FINAL IMPRESSION:     1. Dizziness    2. Cellulitis, unspecified cellulitis site        DISPOSITION:   Decision To Discharge    PATIENT REFERRED TO:   Romeo Montiel MD  [address] [place]  [facility] [address] [phone]    Call in 2 days  As needed, If symptoms worsen      DISCHARGE MEDICATIONS:     New Prescriptions    CEPHALEXIN (KEFLEX) 500 MG CAPSULE    Take 1 capsule by mouth 2 times daily for 7 days         RAYMUNDO Baer CNP       (Please note that portions of this note were completed with a voice recognition program.  Efforts were made to edit the dictations but occasionally words are mis-transcribed.)       Ashly Reyes APRN - CNP  06/08/25 1027

## 2025-06-08 NOTE — DISCHARGE INSTRUCTIONS
Your potassium was low today at only 3.0 we did provide supplementation.  You may want your potassium checked again in another week.  Your wounds on your arm look slightly infected I did prescribe Keflex for this she can start it tonight or tomorrow.  Follow-up with primary care next week for reevaluation    Please understand that at this time there is no evidence for a more serious underlying process, but that early in the process of an illness or injury, an emergency department workup can be falsely reassuring.  You should contact your family doctor within the next 48 hours for a follow up appointment    THANK YOU!!!    From [facility] and [facility]    On behalf of the Emergency Department staff at [facility], I would like to thank you for giving us the opportunity to address your health care needs and concerns.    We hope that during your visit, our service was delivered in a professional and caring manner. Please keep [facility] in mind as we walk with you down the path to your own personal wellness.     Please expect an automated text message or email from us so we can ask a few questions about your health and progress. Based on your answers, a clinician may call you back to offer help and instructions.    Please understand that early in the process of an illness or injury, an emergency department workup can be falsely reassuring.  If you notice any worsening, changing or persistent symptoms please call your family doctor or return to the ER immediately.     Tell us how we did during your visit at [email or web address]   and let us know about your experience

## 2025-06-09 LAB
EKG Q-T INTERVAL: 420 MS
EKG QRS DURATION: 86 MS
EKG QTC CALCULATION (BAZETT): 456 MS
EKG R AXIS: -16 DEGREES
EKG T AXIS: 50 DEGREES
EKG VENTRICULAR RATE: 71 BPM

## (undated) DEVICE — SUTURE PROL SZ 6-0 L24IN NONABSORBABLE BLU L9.3MM BV-1 3/8 8805H

## (undated) DEVICE — SUTURE NONABSORBABLE MONOFILAMENT 4-0 RB-1 36 IN BLU PROLENE 8557H

## (undated) DEVICE — CONTAINER,SPECIMEN,OR STERILE,4OZ: Brand: MEDLINE

## (undated) DEVICE — SUTURE VCRL SZ 3-0 L27IN ABSRB UD L26MM SH 1/2 CIR J416H

## (undated) DEVICE — GEL US 20GM NONIRRITATING OVERWRAPPED FILE PCH TRNSMIT

## (undated) DEVICE — SHUNT CV L30CM DIA3X5MM SIL EXT LOOP FULL SPR REINF SUNDT
Type: IMPLANTABLE DEVICE | Site: NECK | Status: NON-FUNCTIONAL
Removed: 2019-05-16

## (undated) DEVICE — TUBING, SUCTION, 1/4" X 12', STRAIGHT: Brand: MEDLINE

## (undated) DEVICE — SUTURE VCRL SZ 2-0 L36IN ABSRB UD L36MM CT-1 1/2 CIR J945H

## (undated) DEVICE — DRESSING TRNSPAR W5XL4.5IN FLM SHT SEMIPERMEABLE WIND

## (undated) DEVICE — 35 ML SYRINGE LUER-LOCK TIP: Brand: MONOJECT

## (undated) DEVICE — 3M™ WARMING BLANKET, LOWER BODY, 10 PER CASE, 42568: Brand: BAIR HUGGER™

## (undated) DEVICE — 3F 80 CM NOVASIL SILICONE SINGLE LUMEN EMBOLECTOMY CATHETER: Brand: LEMAITRE EMBOLECTOMY CATHETER

## (undated) DEVICE — APPLICATOR MEDICATED 26 CC SOLUTION HI LT ORNG CHLORAPREP

## (undated) DEVICE — BLANKET WRM W29.9XL79.1IN UP BODY FORC AIR MISTRAL-AIR

## (undated) DEVICE — BLADE CLIPPER GEN PURP NS

## (undated) DEVICE — GLOVE SURG SZ 7 L12IN FNGR THK79MIL GRN LTX FREE

## (undated) DEVICE — SYRINGE MED 3ML CLR PLAS STD N CTRL LUERLOCK TIP DISP

## (undated) DEVICE — GLOVE SURG SZ 65 L12IN FNGR THK87MIL WHT LTX FREE

## (undated) DEVICE — GLOVE SURG SZ 7 L12IN FNGR THK87MIL WHT LTX FREE

## (undated) DEVICE — CANNULA PERF L2IN BLNT TIP 2MM VES CLR RADPQ BODY FEM LUER

## (undated) DEVICE — POSITIONER HD W8XH4XL8.5IN RASPBERRY FOAM SLT

## (undated) DEVICE — SUTURE PERMAHAND SZ 2-0 L30IN NONABSORBABLE BLK SILK W/O A305H

## (undated) DEVICE — Device

## (undated) DEVICE — DRAPE THYROID

## (undated) DEVICE — GARMENT,MEDLINE,DVT,INT,CALF,MED, GEN2: Brand: MEDLINE

## (undated) DEVICE — YANKAUER,FLEXIBLE HANDLE,REGLR CAPACITY: Brand: MEDLINE INDUSTRIES, INC.

## (undated) DEVICE — PRESSURE MONITORING SET: Brand: TRUWAVE

## (undated) DEVICE — GLOVE SURG SZ 75 CRM LTX FREE POLYISOPRENE POLYMER BEAD ANTI

## (undated) DEVICE — 4F 80 CM NOVASIL SILICONE SINGLE LUMEN EMBOLECTOMY CATHETER: Brand: LEMAITRE EMBOLECTOMY CATHETER

## (undated) DEVICE — TOWEL,OR,DSP,ST,BLUE,STD,4/PK,20PK/CS: Brand: MEDLINE

## (undated) DEVICE — PLEDGET SURG W3.5XL7MM THK1.5MM WHT PTFE RECT SFT TFE

## (undated) DEVICE — 500ML,PRESSURE INFUSER W/STOPCOCK: Brand: MEDLINE

## (undated) DEVICE — SUTURE NONABSORBABLE MONOFILAMENT 6-0 BV-1 1X30 IN PROLENE 8709H

## (undated) DEVICE — SOLUTION IV 500ML 0.9% SOD CHL PH 5 INJ USP VIAFLX PLAS

## (undated) DEVICE — SHEET, T, LAPAROTOMY, STERILE: Brand: MEDLINE

## (undated) DEVICE — CHLORAPREP 26ML ORANGE

## (undated) DEVICE — GLOVE SURG SZ 75 L12IN FNGR THK87MIL WHT LTX FREE

## (undated) DEVICE — GOWN,SIRUS,NON REINFRCD,LARGE,SET IN SL: Brand: MEDLINE

## (undated) DEVICE — SKIN AFFIX SURG ADHESIVE 72/CS 0.55ML: Brand: MEDLINE

## (undated) DEVICE — SUTURE VCRL SZ 3-0 L36IN ABSRB UD L36MM CT-1 1/2 CIR J944H

## (undated) DEVICE — SET CATH 20GA L1.75IN RAD ART POLYUR RADPQ W/ INTEGR